# Patient Record
Sex: FEMALE | Race: WHITE | Employment: UNEMPLOYED | URBAN - METROPOLITAN AREA
[De-identification: names, ages, dates, MRNs, and addresses within clinical notes are randomized per-mention and may not be internally consistent; named-entity substitution may affect disease eponyms.]

---

## 2021-07-22 ENCOUNTER — HOSPITAL ENCOUNTER (EMERGENCY)
Age: 36
Discharge: HOME | End: 2021-07-22
Attending: EMERGENCY MEDICINE
Payer: COMMERCIAL

## 2021-07-22 VITALS
OXYGEN SATURATION: 97 % | TEMPERATURE: 97.7 F | DIASTOLIC BLOOD PRESSURE: 62 MMHG | HEART RATE: 91 BPM | WEIGHT: 240.5 LBS | HEIGHT: 66 IN | RESPIRATION RATE: 18 BRPM | SYSTOLIC BLOOD PRESSURE: 111 MMHG | BODY MASS INDEX: 38.65 KG/M2

## 2021-07-22 DIAGNOSIS — R60.0 EDEMA OF BOTH LOWER EXTREMITIES: Primary | ICD-10-CM

## 2021-07-22 DIAGNOSIS — Z72.0 TOBACCO ABUSE: ICD-10-CM

## 2021-07-22 DIAGNOSIS — R60.9 PERIPHERAL EDEMA: ICD-10-CM

## 2021-07-22 LAB
ANION GAP SERPL CALC-SCNC: 7 MMOL/L (ref 5–15)
BNP SERPL-MCNC: 108 PG/ML (ref 0–125)
BUN SERPL-MCNC: 9 MG/DL (ref 6–20)
BUN/CREAT SERPL: 9 (ref 12–20)
CALCIUM SERPL-MCNC: 8.6 MG/DL (ref 8.5–10.1)
CHLORIDE SERPL-SCNC: 107 MMOL/L (ref 97–108)
CO2 SERPL-SCNC: 29 MMOL/L (ref 21–32)
CREAT SERPL-MCNC: 0.95 MG/DL (ref 0.55–1.02)
ERYTHROCYTE [DISTWIDTH] IN BLOOD BY AUTOMATED COUNT: 14.7 % (ref 11.5–14.5)
GLUCOSE SERPL-MCNC: 84 MG/DL (ref 65–100)
HCT VFR BLD AUTO: 34.9 % (ref 35–47)
HGB BLD-MCNC: 11.2 G/DL (ref 11.5–16)
MCH RBC QN AUTO: 28.7 PG (ref 26–34)
MCHC RBC AUTO-ENTMCNC: 32.1 G/DL (ref 30–36.5)
MCV RBC AUTO: 89.5 FL (ref 80–99)
NRBC # BLD: 0 K/UL (ref 0–0.01)
NRBC BLD-RTO: 0 PER 100 WBC
PLATELET # BLD AUTO: 240 K/UL (ref 150–400)
PMV BLD AUTO: 10.4 FL (ref 8.9–12.9)
POTASSIUM SERPL-SCNC: 3.7 MMOL/L (ref 3.5–5.1)
RBC # BLD AUTO: 3.9 M/UL (ref 3.8–5.2)
SODIUM SERPL-SCNC: 143 MMOL/L (ref 136–145)
WBC # BLD AUTO: 6 K/UL (ref 3.6–11)

## 2021-07-22 PROCEDURE — 85027 COMPLETE CBC AUTOMATED: CPT

## 2021-07-22 PROCEDURE — 96375 TX/PRO/DX INJ NEW DRUG ADDON: CPT

## 2021-07-22 PROCEDURE — 83880 ASSAY OF NATRIURETIC PEPTIDE: CPT

## 2021-07-22 PROCEDURE — 96374 THER/PROPH/DIAG INJ IV PUSH: CPT

## 2021-07-22 PROCEDURE — 36415 COLL VENOUS BLD VENIPUNCTURE: CPT

## 2021-07-22 PROCEDURE — 80048 BASIC METABOLIC PNL TOTAL CA: CPT

## 2021-07-22 PROCEDURE — 74011250636 HC RX REV CODE- 250/636: Performed by: EMERGENCY MEDICINE

## 2021-07-22 PROCEDURE — 99283 EMERGENCY DEPT VISIT LOW MDM: CPT

## 2021-07-22 RX ORDER — FUROSEMIDE 10 MG/ML
40 INJECTION INTRAMUSCULAR; INTRAVENOUS
Status: COMPLETED | OUTPATIENT
Start: 2021-07-22 | End: 2021-07-22

## 2021-07-22 RX ORDER — BUPRENORPHINE HYDROCHLORIDE AND NALOXONE HYDROCHLORIDE DIHYDRATE 8; 2 MG/1; MG/1
3 TABLET SUBLINGUAL DAILY
COMMUNITY

## 2021-07-22 RX ORDER — FUROSEMIDE 20 MG/1
20 TABLET ORAL DAILY
Qty: 10 TABLET | Refills: 0 | Status: SHIPPED | OUTPATIENT
Start: 2021-07-22 | End: 2021-08-01

## 2021-07-22 RX ORDER — NAPROXEN 500 MG/1
500 TABLET ORAL 2 TIMES DAILY WITH MEALS
Qty: 20 TABLET | Refills: 0 | Status: ON HOLD | OUTPATIENT
Start: 2021-07-22 | End: 2022-05-10

## 2021-07-22 RX ORDER — KETOROLAC TROMETHAMINE 30 MG/ML
30 INJECTION, SOLUTION INTRAMUSCULAR; INTRAVENOUS
Status: COMPLETED | OUTPATIENT
Start: 2021-07-22 | End: 2021-07-22

## 2021-07-22 RX ORDER — CLONAZEPAM 1 MG/1
1 TABLET ORAL 2 TIMES DAILY
COMMUNITY

## 2021-07-22 RX ADMIN — FUROSEMIDE 40 MG: 10 INJECTION, SOLUTION INTRAVENOUS at 15:12

## 2021-07-22 RX ADMIN — KETOROLAC TROMETHAMINE 30 MG: 30 INJECTION, SOLUTION INTRAMUSCULAR; INTRAVENOUS at 15:52

## 2021-07-22 NOTE — DISCHARGE INSTRUCTIONS
Lizzie Ganser, it was a pleasure taking care of you at [facility] Emergency Department today. We know that when you come to [facility], you are entrusting us with your health, comfort, and safety. Our physicians and nurses honor that trust, and we truly appreciate the opportunity to care for you and your loved ones. We also VALUE your feedback. If you receive a survey about your Emergency Department experience today, please fill it out. We care about our patients' feedback, and we listen to what you have to say. Thank you!

## 2021-07-22 NOTE — ED NOTES
Emergency Department Nursing Plan of Care       The Nursing Plan of Care is developed from the Nursing assessment and Emergency Department Attending provider initial evaluation. The plan of care may be reviewed in the ED Provider note.     The Plan of Care was developed with the following considerations:   Patient / Family readiness to learn indicated by:verbalized understanding  Persons(s) to be included in education: patient  Barriers to Learning/Limitations:No    Signed     Alma Rosa Giraldo RN    7/22/2021   3:27 PM

## 2021-07-22 NOTE — LETTER
[facility] EMERGENCY DEPT  [address] [address] [phone]    Work/School Note    Date: 7/22/2021    To Whom It May concern:    Daryn Power was seen and treated today in the emergency room by the following provider(s):  Attending Provider: Alva Casarez MD.      Daryn Power may return to work on 7/24/21.     Sincerely,          Isacc Sinha MD

## 2021-07-23 NOTE — ED PROVIDER NOTES
EMERGENCY DEPARTMENT HISTORY AND PHYSICAL EXAM      Please note that this dictation was completed with the assistance of \"Dragon\", the computer voice recognition software. Quite often unanticipated grammatical, syntax, homophones, and other interpretive errors are inadvertently transcribed by the computer software. Please disregard these errors and any errors that have escaped final proofreading. Thank you. Patient Name: Marie Churchill  : 1985  MRN: [id number]  History of Presenting Illness     Chief Complaint   Patient presents with    Leg Swelling     bilaterally x3 days     History Provided By: Patient    HPI: Marie Churchill, 38 y.o. female with past medical history as documented below presents to the ED with c/o of 3 days of atraumatic bilateral lower extremity swelling. Pt states she noticed the swelling three days ago, worsening since. Denies falls or trauma. States edema is worse at end of day. She has tried to prop up her legs with some relief. She states she had similar swelling last year when she was \"admitted for infection and the antibiotics caused swelling. \" Denies any new medications. Denies hx of CHF, liver disease or kidney disease. Denies recent prolonged travel or immobilization, no numbness/weakness, no OCP use, no hx of DVT/PE. Pt denies any other exacerbating or ameliorating factors. Additionally, pt specifically denies any recent fever, chills, headache, nausea, vomiting, abdominal pain, CP, SOB, lightheadedness, dizziness, numbness, weakness, heart palpitations, urinary sxs, diarrhea, constipation, melena, hematochezia, cough, or congestion. There are no other complaints, changes or physical findings pertinent to the HPI at this time.     PCP: Cristy, MD Christopher    Past History   Past Medical History:  Past Medical History:   Diagnosis Date    Psychiatric disorder     depression    Psychiatric disorder     anxiety       Past Surgical History:  Past Surgical History:   Procedure Laterality Date    HX ORTHOPAEDIC  2012    left shoulder       Family History:  Denies    Social History:  Social History     Tobacco Use    Smoking status: Not on file   Substance Use Topics    Alcohol use: Not on file    Drug use: Not on file       Allergies:  No Known Allergies    Current Medications:  No current facility-administered medications on file prior to encounter. Current Outpatient Medications on File Prior to Encounter   Medication Sig Dispense Refill    clonazePAM (KlonoPIN) 1 mg tablet Take 1 mg by mouth two (2) times a day.  buprenorphine-naloxone 8-2 mg subl 2 Tablets by SubLINGual route daily. Review of Systems   A complete ROS was reviewed by me today and was negative, unless otherwise specified below:  Review of Systems   Constitutional: Negative. Negative for chills and fever. HENT: Negative. Negative for congestion and sore throat. Eyes: Negative. Respiratory: Negative. Negative for cough, chest tightness, shortness of breath and wheezing. Cardiovascular: Positive for leg swelling. Negative for chest pain and palpitations. Gastrointestinal: Negative. Negative for abdominal distention, abdominal pain, blood in stool, constipation, diarrhea, nausea and vomiting. Endocrine: Negative. Genitourinary: Negative. Negative for dysuria, flank pain, frequency, hematuria and urgency. Musculoskeletal: Negative. Negative for arthralgias, back pain and myalgias. Skin: Negative. Negative for color change and rash. Neurological: Negative. Negative for dizziness, syncope, speech difficulty, weakness, light-headedness, numbness and headaches. Hematological: Negative. Psychiatric/Behavioral: Negative. Negative for confusion and self-injury. The patient is not nervous/anxious. All other systems reviewed and are negative. Physical Exam   Physical Exam  Vitals and nursing note reviewed. Constitutional:       General: She is not in acute distress. Appearance: She is well-developed. She is not diaphoretic. HENT:      Head: Normocephalic and atraumatic. Mouth/Throat:      Pharynx: No oropharyngeal exudate. Eyes:      Conjunctiva/sclera: Conjunctivae normal.   Cardiovascular:      Rate and Rhythm: Normal rate and regular rhythm. Heart sounds: Normal heart sounds. Pulmonary:      Effort: Pulmonary effort is normal. No respiratory distress. Breath sounds: Normal breath sounds. No wheezing or rales. Chest:      Chest wall: No tenderness. Abdominal:      General: Bowel sounds are normal. There is no distension. Palpations: Abdomen is soft. There is no mass. Tenderness: There is no abdominal tenderness. There is no guarding or rebound. Musculoskeletal:         General: Normal range of motion. Cervical back: Normal range of motion. Right lower leg: Edema present. Left lower leg: Edema present. Comments: No palpable cords, comparments soft, no overlying erythema or warmth, NVI distally, +1 bilateral pedal edema   Skin:     General: Skin is warm. Neurological:      Mental Status: She is alert and oriented to person, place, and time. Cranial Nerves: No cranial nerve deficit. Motor: No abnormal muscle tone. Diagnostic Study Results     Labs -   I have personally reviewed and interpreted all available laboratory results.    Recent Results (from the past 24 hour(s))   CBC W/O DIFF    Collection Time: 07/22/21  2:57 PM   Result Value Ref Range    WBC 6.0 3.6 - 11.0 K/uL    RBC 3.90 3.80 - 5.20 M/uL    HGB 11.2 (L) 11.5 - 16.0 g/dL    HCT 34.9 (L) 35.0 - 47.0 %    MCV 89.5 80.0 - 99.0 FL    MCH 28.7 26.0 - 34.0 PG    MCHC 32.1 30.0 - 36.5 g/dL    RDW 14.7 (H) 11.5 - 14.5 %    PLATELET 558 309 - 708 K/uL    MPV 10.4 8.9 - 12.9 FL    NRBC 0.0 0  WBC    ABSOLUTE NRBC 0.00 0.00 - 0.94 K/uL   METABOLIC PANEL, BASIC    Collection Time: 07/22/21  2:57 PM   Result Value Ref Range    Sodium 143 136 - 145 mmol/L    Potassium 3.7 3.5 - 5.1 mmol/L    Chloride 107 97 - 108 mmol/L    CO2 29 21 - 32 mmol/L    Anion gap 7 5 - 15 mmol/L    Glucose 84 65 - 100 mg/dL    BUN 9 6 - 20 MG/DL    Creatinine 0.95 0.55 - 1.02 MG/DL    BUN/Creatinine ratio 9 (L) 12 - 20      GFR est AA >60 >60 ml/min/1.73m2    GFR est non-AA >60 >60 ml/min/1.73m2    Calcium 8.6 8.5 - 10.1 MG/DL   NT-PRO BNP    Collection Time: 07/22/21  2:57 PM   Result Value Ref Range    NT pro- 0 - 125 PG/ML       Radiologic Studies -   I have personally reviewed and interpreted all available imaging studies and agree with radiology interpretation and report. No orders to display     CT Results  (Last 48 hours)    None        CXR Results  (Last 48 hours)    None          Medical Decision Making   I reviewed the vital signs, available nursing notes, past medical history, past surgical history, family history and social history. Vital Signs-Reviewed the patient's vital signs. Patient Vitals for the past 24 hrs:   Temp Pulse Resp BP SpO2   07/22/21 1605  91 18 111/62 97 %   07/22/21 1435 97.7 °F (36.5 °C) 95 18 108/72 98 %       Pulse Oximetry Analysis - 98% on RA    Cardiac Monitor:   Rate: 95 bpm  The cardiac monitor revealed the following rhythm as interpreted by me: Normal Sinus Rhythm       Records Reviewed: Nursing Notes, Old Medical Records, Previous electrocardiograms, Previous Radiology Studies and Previous Laboratory Studies    Provider Notes (Medical Decision Making):   Pt presents with acute bilateral lower extremity swelling; stable vitals; PMS intact in affected limb. DDx: lymphedema, muscle strain vs sprain. The pt is unlikely to have DVT. There is no recent travel, h/o PE/DVT, neg gary, no hormone use, non-smoker. There is no trauma, deformity to warrant x-ray. The leg is not cold to touch, there is strong peripheral pulse, no paralysis or parasthesia, no pallor to suggest compartment syndrome.  There are no rashes, excessive warmth, fever, induration of skin to suggest cellulitis/osteo. The pt is motor and sensory intact. Will provide symptomatic treatment and reassess. Anticipate discharge home with close PCP follow-up. ED Course:   I am the first physician for this patient's ED visit today. Initial assessment performed. I discussed presenting problems, concerns and my formulated plan for today's visit with the patient and any available family members at bedside. I encouraged them to ask questions as they arise throughout the visit. I reviewed our electronic medical record system for any past medical records that were available that may contribute to the patient's current condition, the nursing notes and vital signs from today's visit. ED Orders Placed :  Orders Placed This Encounter    CBC W/O DIFF    METABOLIC PANEL, BASIC    NT-PRO BNP    furosemide (LASIX) injection 40 mg    clonazePAM (KlonoPIN) 1 mg tablet    buprenorphine-naloxone 8-2 mg subl    ketorolac (TORADOL) injection 30 mg    furosemide (Lasix) 20 mg tablet    naproxen (NAPROSYN) 500 mg tablet       ED Medications Administered:  Medications   furosemide (LASIX) injection 40 mg (40 mg IntraVENous Given 7/22/21 1512)   ketorolac (TORADOL) injection 30 mg (30 mg IntraVENous Given 7/22/21 1552)        Progress Note:  Patient has been reassessed and reports feeling better and symptoms have improved significantly after ED treatment. Ciara Bates's final labs and imaging have been reviewed with her and available family and/or caregiver. They have been counseled regarding her diagnosis. She verbally conveys understanding and agreement of the signs, symptoms, diagnosis, treatment and prognosis and additionally agrees to follow up as recommended with Dr. Mirtha Marin MD and/or specialist in 24 - 48 hours. She also agrees with the care-plan we created together and conveys that all of her questions have been answered.   I have also put together a packet of discharge instructions for her that include: 1) educational information regarding their diagnosis, 2) how to care for their diagnosis at home, as well a 3) list of reasons why they would want to return to the ED prior to their follow-up appointment should the patient's condition change or symptoms worsen. I have answered all questions to the patient's satisfaction. Strict return precautions given. She both understood and agreed with plan as discussed. Vital signs stable for discharge. Disposition:   DISCHARGE  The pt is ready for discharge. The pt's signs, symptoms, diagnosis, and discharge instructions have been discussed and pt has conveyed their understanding. The pt is to follow up as recommended or return to ER should their symptoms worsen. Plan has been discussed and pt is in agreement. Plan:  1. Return precautions as discussed with patient and available family and/or caregiver. 2.   Discharge Medication List as of 7/22/2021  3:51 PM      START taking these medications    Details   furosemide (Lasix) 20 mg tablet Take 1 Tablet by mouth daily for 10 days. , Normal, Disp-10 Tablet, R-0      naproxen (NAPROSYN) 500 mg tablet Take 1 Tablet by mouth two (2) times daily (with meals). , Normal, Disp-20 Tablet, R-0         CONTINUE these medications which have NOT CHANGED    Details   clonazePAM (KlonoPIN) 1 mg tablet Take 1 mg by mouth two (2) times a day., Historical Med      buprenorphine-naloxone 8-2 mg subl 2 Tablets by SubLINGual route daily. , Historical Med           3. Follow-up Information     Follow up With Specialties Details Why [address] EMERGENCY DEPT Emergency Medicine  As needed, If symptoms worsen [address]          Instructed to return to ED if worse  Diagnosis   Clinical Impression:  1. Edema of both lower extremities    2. Peripheral edema    3.  Tobacco abuse        Attestation:  Clarice Etienne MD, am the attending of record for this patient. I personally performed the services described in this documentation on this date, 7/22/2021 for patient, Hannah Westfall. I have reviewed the chart and verified that the record is accurate and complete.

## 2022-04-12 ENCOUNTER — HOSPITAL ENCOUNTER (EMERGENCY)
Age: 37
Discharge: LEFT BEFORE DISCHARGE | End: 2022-04-12
Attending: EMERGENCY MEDICINE
Payer: COMMERCIAL

## 2022-04-12 ENCOUNTER — APPOINTMENT (OUTPATIENT)
Dept: VASCULAR SURGERY | Age: 37
End: 2022-04-12
Attending: NURSE PRACTITIONER
Payer: COMMERCIAL

## 2022-04-12 ENCOUNTER — APPOINTMENT (OUTPATIENT)
Dept: GENERAL RADIOLOGY | Age: 37
End: 2022-04-12
Attending: NURSE PRACTITIONER
Payer: COMMERCIAL

## 2022-04-12 VITALS
RESPIRATION RATE: 16 BRPM | WEIGHT: 279.76 LBS | HEART RATE: 83 BPM | BODY MASS INDEX: 44.96 KG/M2 | HEIGHT: 66 IN | OXYGEN SATURATION: 97 % | TEMPERATURE: 98 F | SYSTOLIC BLOOD PRESSURE: 161 MMHG | DIASTOLIC BLOOD PRESSURE: 62 MMHG

## 2022-04-12 DIAGNOSIS — I87.8 VENOUS STASIS: Primary | ICD-10-CM

## 2022-04-12 LAB
ANION GAP SERPL CALC-SCNC: 2 MMOL/L (ref 5–15)
ATRIAL RATE: 91 BPM
BASOPHILS # BLD: 0 K/UL (ref 0–0.1)
BASOPHILS NFR BLD: 1 % (ref 0–1)
BNP SERPL-MCNC: 23 PG/ML
BUN SERPL-MCNC: 9 MG/DL (ref 6–20)
BUN/CREAT SERPL: 10 (ref 12–20)
CALCIUM SERPL-MCNC: 8.9 MG/DL (ref 8.5–10.1)
CALCULATED P AXIS, ECG09: 28 DEGREES
CALCULATED R AXIS, ECG10: -11 DEGREES
CALCULATED T AXIS, ECG11: 3 DEGREES
CHLORIDE SERPL-SCNC: 107 MMOL/L (ref 97–108)
CO2 SERPL-SCNC: 28 MMOL/L (ref 21–32)
COMMENT, HOLDF: NORMAL
CREAT SERPL-MCNC: 0.89 MG/DL (ref 0.55–1.02)
DIAGNOSIS, 93000: NORMAL
DIFFERENTIAL METHOD BLD: ABNORMAL
EOSINOPHIL # BLD: 0.5 K/UL (ref 0–0.4)
EOSINOPHIL NFR BLD: 6 % (ref 0–7)
ERYTHROCYTE [DISTWIDTH] IN BLOOD BY AUTOMATED COUNT: 16.1 % (ref 11.5–14.5)
GLUCOSE SERPL-MCNC: 76 MG/DL (ref 65–100)
HCT VFR BLD AUTO: 33.1 % (ref 35–47)
HGB BLD-MCNC: 10.3 G/DL (ref 11.5–16)
IMM GRANULOCYTES # BLD AUTO: 0 K/UL (ref 0–0.04)
IMM GRANULOCYTES NFR BLD AUTO: 0 % (ref 0–0.5)
LYMPHOCYTES # BLD: 2.3 K/UL (ref 0.8–3.5)
LYMPHOCYTES NFR BLD: 28 % (ref 12–49)
MAGNESIUM SERPL-MCNC: 2.3 MG/DL (ref 1.6–2.4)
MCH RBC QN AUTO: 26.8 PG (ref 26–34)
MCHC RBC AUTO-ENTMCNC: 31.1 G/DL (ref 30–36.5)
MCV RBC AUTO: 86.2 FL (ref 80–99)
MONOCYTES # BLD: 0.6 K/UL (ref 0–1)
MONOCYTES NFR BLD: 7 % (ref 5–13)
NEUTS SEG # BLD: 4.7 K/UL (ref 1.8–8)
NEUTS SEG NFR BLD: 58 % (ref 32–75)
NRBC # BLD: 0 K/UL (ref 0–0.01)
NRBC BLD-RTO: 0 PER 100 WBC
P-R INTERVAL, ECG05: 174 MS
PLATELET # BLD AUTO: 317 K/UL (ref 150–400)
PMV BLD AUTO: 10.2 FL (ref 8.9–12.9)
POTASSIUM SERPL-SCNC: 4 MMOL/L (ref 3.5–5.1)
Q-T INTERVAL, ECG07: 398 MS
QRS DURATION, ECG06: 88 MS
QTC CALCULATION (BEZET), ECG08: 489 MS
RBC # BLD AUTO: 3.84 M/UL (ref 3.8–5.2)
SAMPLES BEING HELD,HOLD: NORMAL
SODIUM SERPL-SCNC: 137 MMOL/L (ref 136–145)
VENTRICULAR RATE, ECG03: 91 BPM
WBC # BLD AUTO: 8 K/UL (ref 3.6–11)

## 2022-04-12 PROCEDURE — 74011250636 HC RX REV CODE- 250/636: Performed by: NURSE PRACTITIONER

## 2022-04-12 PROCEDURE — 83880 ASSAY OF NATRIURETIC PEPTIDE: CPT

## 2022-04-12 PROCEDURE — 96374 THER/PROPH/DIAG INJ IV PUSH: CPT

## 2022-04-12 PROCEDURE — 83735 ASSAY OF MAGNESIUM: CPT

## 2022-04-12 PROCEDURE — 71046 X-RAY EXAM CHEST 2 VIEWS: CPT

## 2022-04-12 PROCEDURE — 80048 BASIC METABOLIC PNL TOTAL CA: CPT

## 2022-04-12 PROCEDURE — 74011250637 HC RX REV CODE- 250/637: Performed by: NURSE PRACTITIONER

## 2022-04-12 PROCEDURE — 93970 EXTREMITY STUDY: CPT

## 2022-04-12 PROCEDURE — 99285 EMERGENCY DEPT VISIT HI MDM: CPT

## 2022-04-12 PROCEDURE — 85025 COMPLETE CBC W/AUTO DIFF WBC: CPT

## 2022-04-12 PROCEDURE — 36415 COLL VENOUS BLD VENIPUNCTURE: CPT

## 2022-04-12 PROCEDURE — 93005 ELECTROCARDIOGRAM TRACING: CPT

## 2022-04-12 RX ORDER — METHOCARBAMOL 750 MG/1
750 TABLET, FILM COATED ORAL ONCE
Status: COMPLETED | OUTPATIENT
Start: 2022-04-12 | End: 2022-04-12

## 2022-04-12 RX ORDER — KETOROLAC TROMETHAMINE 30 MG/ML
15 INJECTION, SOLUTION INTRAMUSCULAR; INTRAVENOUS
Status: COMPLETED | OUTPATIENT
Start: 2022-04-12 | End: 2022-04-12

## 2022-04-12 RX ORDER — FUROSEMIDE 20 MG/1
20 TABLET ORAL DAILY
Qty: 10 TABLET | Refills: 0 | Status: SHIPPED | OUTPATIENT
Start: 2022-04-12 | End: 2022-04-22

## 2022-04-12 RX ADMIN — METHOCARBAMOL 750 MG: 750 TABLET ORAL at 13:09

## 2022-04-12 RX ADMIN — KETOROLAC TROMETHAMINE 15 MG: 30 INJECTION, SOLUTION INTRAMUSCULAR; INTRAVENOUS at 13:09

## 2022-04-12 NOTE — ED TRIAGE NOTES
Patient arrives for bilateral leg swelling for over a week. Patient continues to state that she fell out of the bed about a week ago and has had RIGHT sided lower back pain since.  Denies taking anything prior to arrival.

## 2022-04-12 NOTE — ED PROVIDER NOTES
41-year-old female with a past medical history of anxiety and depression presents to the ER today for evaluation of acute left-sided back pain, bilateral lower extremity pain and swelling. Patient states that she started to experience bilateral lower extremity swelling while ago which has continued to progress to the point where her legs now feel very tight, heavy, and are starting to ache. She also noticed that yesterday after walking up a few flights of stairs she felt short of breath. She also reports acute left-sided low back/buttock pain after blunt injury that occurred about 1 week ago. She states that first she had to walk in a bent over posture to prevent her back from hurting, but it has started to improve a little bit over the last several days. Of note, it seems the patient was seen in the facility in 2021 for her bilateral lower extremity swelling. At that time she had a negative work-up, and was placed on Lasix for short period of time, which she states helped to improve her symptoms. She has an appointment to establish care with a PCP on May 11th of this year. Past Medical History:   Diagnosis Date    Psychiatric disorder     depression    Psychiatric disorder     anxiety       Past Surgical History:   Procedure Laterality Date    HX ORTHOPAEDIC  2012    left shoulder         History reviewed. No pertinent family history.     Social History     Socioeconomic History    Marital status:      Spouse name: Not on file    Number of children: Not on file    Years of education: Not on file    Highest education level: Not on file   Occupational History    Not on file   Tobacco Use    Smoking status: Never Smoker    Smokeless tobacco: Never Used   Substance and Sexual Activity    Alcohol use: Not Currently    Drug use: Not on file    Sexual activity: Not on file   Other Topics Concern    Not on file   Social History Narrative    Not on file     Social Determinants of Health     Financial Resource Strain:     Difficulty of Paying Living Expenses: Not on file   Food Insecurity:     Worried About Running Out of Food in the Last Year: Not on file    Kamala of Food in the Last Year: Not on file   Transportation Needs:     Lack of Transportation (Medical): Not on file    Lack of Transportation (Non-Medical): Not on file   Physical Activity:     Days of Exercise per Week: Not on file    Minutes of Exercise per Session: Not on file   Stress:     Feeling of Stress : Not on file   Social Connections:     Frequency of Communication with Friends and Family: Not on file    Frequency of Social Gatherings with Friends and Family: Not on file    Attends Sabianism Services: Not on file    Active Member of [address] [place] [address] [employer] or Organizations: Not on file    Attends Club or Organization Meetings: Not on file    Marital Status: Not on file   Intimate Partner Violence:     Fear of Current or Ex-Partner: Not on file    Emotionally Abused: Not on file    Physically Abused: Not on file    Sexually Abused: Not on file   Housing Stability:     Unable to Pay for Housing in the Last Year: Not on file    Number of [place] in the Last Year: Not on file    Unstable Housing in the Last Year: Not on file         ALLERGIES: Patient has no known allergies. Review of Systems   Constitutional: Negative for fever. HENT: Negative for sore throat. Eyes: Negative for visual disturbance. Respiratory: Positive for shortness of breath. No orthopnea   Cardiovascular: Positive for leg swelling. Negative for chest pain and palpitations. Gastrointestinal: Negative for vomiting. Genitourinary: Negative for dysuria. Musculoskeletal: Positive for back pain. Skin: Negative for rash. Neurological: Negative for dizziness.        Vitals:    04/12/22 0928   BP: (!) 161/62   Pulse: 83   Resp: 16   Temp: 98 °F (36.7 °C)   SpO2: 97%   Weight: 126.9 kg (279 lb 12.2 oz)   Height: 5' 6\" (1.676 m) Physical Exam  Vitals and nursing note reviewed. Constitutional:       General: She is not in acute distress. Appearance: Normal appearance. She is not ill-appearing. HENT:      Head: Normocephalic and atraumatic. Right Ear: External ear normal.      Left Ear: External ear normal.      Nose: Nose normal.      Mouth/Throat:      Mouth: Mucous membranes are moist.      Pharynx: Oropharynx is clear. Eyes:      General: No scleral icterus. Extraocular Movements: Extraocular movements intact. Conjunctiva/sclera: Conjunctivae normal.      Pupils: Pupils are equal, round, and reactive to light. Cardiovascular:      Rate and Rhythm: Normal rate and regular rhythm. Pulses: Normal pulses. Radial pulses are 2+ on the right side and 2+ on the left side. Heart sounds: Normal heart sounds. Comments: Moderate to severe bilateral lower extremity swelling without pitting  Pulmonary:      Effort: Pulmonary effort is normal.      Breath sounds: Normal breath sounds. Abdominal:      General: Abdomen is flat. Bowel sounds are normal.      Palpations: Abdomen is soft. Musculoskeletal:      Cervical back: Normal range of motion and neck supple. No rigidity. No muscular tenderness. Lumbar back: Tenderness present. Decreased range of motion. Comments: Pain to palpation along left paraspinal musculature and left upper buttock musculature   Skin:     General: Skin is warm and dry. Coloration: Skin is not pale. Neurological:      General: No focal deficit present. Mental Status: She is alert and oriented to person, place, and time. Psychiatric:         Mood and Affect: Mood normal.         Behavior: Behavior normal.         Thought Content: Thought content normal.         Judgment: Judgment normal.          MDM      VITAL SIGNS:  No data found.       LABS:  Recent Results (from the past 6 hour(s))   EKG, 12 LEAD, INITIAL    Collection Time: 04/12/22 12:50 PM Result Value Ref Range    Ventricular Rate 91 BPM    Atrial Rate 91 BPM    P-R Interval 174 ms    QRS Duration 88 ms    Q-T Interval 398 ms    QTC Calculation (Bezet) 489 ms    Calculated P Axis 28 degrees    Calculated R Axis -11 degrees    Calculated T Axis 3 degrees    Diagnosis       Normal sinus rhythm  Minimal voltage criteria for LVH, may be normal variant ( R in aVL )  Nonspecific T wave abnormality  Prolonged QT  Abnormal ECG  No previous ECGs available  Confirmed by Alejandra Le ([id number]) on 4/12/2022 1:13:09 PM     CBC WITH AUTOMATED DIFF    Collection Time: 04/12/22  1:01 PM   Result Value Ref Range    WBC 8.0 3.6 - 11.0 K/uL    RBC 3.84 3.80 - 5.20 M/uL    HGB 10.3 (L) 11.5 - 16.0 g/dL    HCT 33.1 (L) 35.0 - 47.0 %    MCV 86.2 80.0 - 99.0 FL    MCH 26.8 26.0 - 34.0 PG    MCHC 31.1 30.0 - 36.5 g/dL    RDW 16.1 (H) 11.5 - 14.5 %    PLATELET 964 322 - 982 K/uL    MPV 10.2 8.9 - 12.9 FL    NRBC 0.0 0  WBC    ABSOLUTE NRBC 0.00 0.00 - 0.01 K/uL    NEUTROPHILS 58 32 - 75 %    LYMPHOCYTES 28 12 - 49 %    MONOCYTES 7 5 - 13 %    EOSINOPHILS 6 0 - 7 %    BASOPHILS 1 0 - 1 %    IMMATURE GRANULOCYTES 0 0.0 - 0.5 %    ABS. NEUTROPHILS 4.7 1.8 - 8.0 K/UL    ABS. LYMPHOCYTES 2.3 0.8 - 3.5 K/UL    ABS. MONOCYTES 0.6 0.0 - 1.0 K/UL    ABS. EOSINOPHILS 0.5 (H) 0.0 - 0.4 K/UL    ABS. BASOPHILS 0.0 0.0 - 0.1 K/UL    ABS. IMM.  GRANS. 0.0 0.00 - 0.04 K/UL    DF AUTOMATED     METABOLIC PANEL, BASIC    Collection Time: 04/12/22  1:01 PM   Result Value Ref Range    Sodium 137 136 - 145 mmol/L    Potassium 4.0 3.5 - 5.1 mmol/L    Chloride 107 97 - 108 mmol/L    CO2 28 21 - 32 mmol/L    Anion gap 2 (L) 5 - 15 mmol/L    Glucose 76 65 - 100 mg/dL    BUN 9 6 - 20 MG/DL    Creatinine 0.89 0.55 - 1.02 MG/DL    BUN/Creatinine ratio 10 (L) 12 - 20      GFR est AA >60 >60 ml/min/1.73m2    GFR est non-AA >60 >60 ml/min/1.73m2    Calcium 8.9 8.5 - 10.1 MG/DL   NT-PRO BNP    Collection Time: 04/12/22  1:01 PM   Result Value Ref Range NT pro-BNP 23 <125 PG/ML   SAMPLES BEING HELD    Collection Time: 04/12/22  1:01 PM   Result Value Ref Range    SAMPLES BEING HELD 1RED 1BLU     COMMENT        Add-on orders for these samples will be processed based on acceptable specimen integrity and analyte stability, which may vary by analyte. MAGNESIUM    Collection Time: 04/12/22  1:01 PM   Result Value Ref Range    Magnesium 2.3 1.6 - 2.4 mg/dL        IMAGING:  DUPLEX LOWER EXT VENOUS BILAT   Final Result      XR CHEST PA LAT   Final Result   Normal chest.            Medications During Visit:  Medications   ketorolac (TORADOL) injection 15 mg (15 mg IntraVENous Given 4/12/22 1309)   methocarbamoL (ROBAXIN) tablet 750 mg (750 mg Oral Given 4/12/22 1309)         DECISION MAKING:  Leopold Larsen is a 43 y.o. female who comes in as above. Patient eloped prior to reevaluation. Work-up unremarkable for any evidence of marked hypoalbuminemia/liver disease, kidney disease, or congestive heart failure. No DVT. Symptoms most consistent with venous stasis. Recommended 10 days of Lasix along with compression stockings and leg elevation (instructions reviewed via telephone after patient had eloped). Recommended establishing care with a PCP in the area for further management. The clinical decision making for this encounter included ordering and interpreting the above diagnostic tests with comparison to prior studies that are within our EMR. Past medical and surgical histories were reviewed, as were records from recent outpatient and emergency department visits. The above results discussed and reviewed with the patient. Patient verbalized understanding of the care plan, including any changes to current outpatient medication regimen, discussed disease process, symptom control, and follow-up care. Return precautions reviewed. IMPRESSION:  1.  Venous stasis        DISPOSITION:  Eloped      Discharge Medication List as of 4/12/2022  2:22 PM CONTINUE these medications which have NOT CHANGED    Details   clonazePAM (KlonoPIN) 1 mg tablet Take 1 mg by mouth two (2) times a day., Historical Med      buprenorphine-naloxone 8-2 mg subl 2 Tablets by SubLINGual route daily. , Historical Med      naproxen (NAPROSYN) 500 mg tablet Take 1 Tablet by mouth two (2) times daily (with meals). , Normal, Disp-20 Tablet, R-0              Follow-up Information    None           The patient is asked to follow-up with their primary care provider in the next several days. They are to call tomorrow for an appointment. The patient is asked to return promptly for any increased concerns or worsening of symptoms. They can return to this emergency department or any other emergency department.       Procedures

## 2022-04-12 NOTE — ED NOTES
Patient did not want to wait on test results. States her  had already called a medicaid cab and she needed to leave. IV removed. Patient ambulatory with steady gait.

## 2022-05-09 ENCOUNTER — APPOINTMENT (OUTPATIENT)
Dept: CT IMAGING | Age: 37
End: 2022-05-09
Attending: EMERGENCY MEDICINE
Payer: COMMERCIAL

## 2022-05-09 ENCOUNTER — HOSPITAL ENCOUNTER (OUTPATIENT)
Age: 37
Setting detail: OBSERVATION
Discharge: HOME | End: 2022-05-11
Attending: EMERGENCY MEDICINE | Admitting: INTERNAL MEDICINE
Payer: COMMERCIAL

## 2022-05-09 DIAGNOSIS — I26.99 BILATERAL PULMONARY EMBOLISM (HCC): Primary | ICD-10-CM

## 2022-05-09 LAB
ALBUMIN SERPL-MCNC: 3.5 G/DL (ref 3.5–5)
ALBUMIN/GLOB SERPL: 0.8 {RATIO} (ref 1.1–2.2)
ALP SERPL-CCNC: 73 U/L (ref 45–117)
ALT SERPL-CCNC: 15 U/L (ref 12–78)
ANION GAP SERPL CALC-SCNC: 7 MMOL/L (ref 5–15)
APTT PPP: 21 SEC (ref 22.1–31)
AST SERPL-CCNC: 23 U/L (ref 15–37)
BASOPHILS # BLD: 0 K/UL (ref 0–0.1)
BASOPHILS NFR BLD: 0 % (ref 0–1)
BILIRUB SERPL-MCNC: 0.3 MG/DL (ref 0.2–1)
BUN SERPL-MCNC: 9 MG/DL (ref 6–20)
BUN/CREAT SERPL: 10 (ref 12–20)
CALCIUM SERPL-MCNC: 8.9 MG/DL (ref 8.5–10.1)
CHLORIDE SERPL-SCNC: 105 MMOL/L (ref 97–108)
CO2 SERPL-SCNC: 28 MMOL/L (ref 21–32)
CREAT SERPL-MCNC: 0.89 MG/DL (ref 0.55–1.02)
D DIMER PPP FEU-MCNC: 5.99 MG/L FEU (ref 0–0.65)
DIFFERENTIAL METHOD BLD: ABNORMAL
EOSINOPHIL # BLD: 0.2 K/UL (ref 0–0.4)
EOSINOPHIL NFR BLD: 3 % (ref 0–7)
ERYTHROCYTE [DISTWIDTH] IN BLOOD BY AUTOMATED COUNT: 16.3 % (ref 11.5–14.5)
GLOBULIN SER CALC-MCNC: 4.4 G/DL (ref 2–4)
GLUCOSE SERPL-MCNC: 97 MG/DL (ref 65–100)
HCG SERPL QL: NEGATIVE
HCT VFR BLD AUTO: 37.8 % (ref 35–47)
HGB BLD-MCNC: 11.7 G/DL (ref 11.5–16)
IMM GRANULOCYTES # BLD AUTO: 0 K/UL (ref 0–0.04)
IMM GRANULOCYTES NFR BLD AUTO: 0 % (ref 0–0.5)
LYMPHOCYTES # BLD: 2.1 K/UL (ref 0.8–3.5)
LYMPHOCYTES NFR BLD: 25 % (ref 12–49)
MCH RBC QN AUTO: 26.7 PG (ref 26–34)
MCHC RBC AUTO-ENTMCNC: 31 G/DL (ref 30–36.5)
MCV RBC AUTO: 86.3 FL (ref 80–99)
MONOCYTES # BLD: 0.4 K/UL (ref 0–1)
MONOCYTES NFR BLD: 5 % (ref 5–13)
NEUTS SEG # BLD: 5.4 K/UL (ref 1.8–8)
NEUTS SEG NFR BLD: 67 % (ref 32–75)
NRBC # BLD: 0 K/UL (ref 0–0.01)
NRBC BLD-RTO: 0 PER 100 WBC
PLATELET # BLD AUTO: 336 K/UL (ref 150–400)
PMV BLD AUTO: 10.5 FL (ref 8.9–12.9)
POTASSIUM SERPL-SCNC: 4.5 MMOL/L (ref 3.5–5.1)
PROT SERPL-MCNC: 7.9 G/DL (ref 6.4–8.2)
RBC # BLD AUTO: 4.38 M/UL (ref 3.8–5.2)
SODIUM SERPL-SCNC: 140 MMOL/L (ref 136–145)
THERAPEUTIC RANGE,PTTT: ABNORMAL SECS (ref 58–77)
TROPONIN-HIGH SENSITIVITY: 131 NG/L (ref 0–51)
WBC # BLD AUTO: 8.2 K/UL (ref 3.6–11)

## 2022-05-09 PROCEDURE — 84484 ASSAY OF TROPONIN QUANT: CPT

## 2022-05-09 PROCEDURE — U0005 INFEC AGEN DETEC AMPLI PROBE: HCPCS

## 2022-05-09 PROCEDURE — 93005 ELECTROCARDIOGRAM TRACING: CPT

## 2022-05-09 PROCEDURE — 74011250636 HC RX REV CODE- 250/636: Performed by: EMERGENCY MEDICINE

## 2022-05-09 PROCEDURE — 85730 THROMBOPLASTIN TIME PARTIAL: CPT

## 2022-05-09 PROCEDURE — 74011000636 HC RX REV CODE- 636: Performed by: EMERGENCY MEDICINE

## 2022-05-09 PROCEDURE — 65270000029 HC RM PRIVATE

## 2022-05-09 PROCEDURE — 84703 CHORIONIC GONADOTROPIN ASSAY: CPT

## 2022-05-09 PROCEDURE — 96374 THER/PROPH/DIAG INJ IV PUSH: CPT

## 2022-05-09 PROCEDURE — 85379 FIBRIN DEGRADATION QUANT: CPT

## 2022-05-09 PROCEDURE — 94762 N-INVAS EAR/PLS OXIMTRY CONT: CPT

## 2022-05-09 PROCEDURE — 71275 CT ANGIOGRAPHY CHEST: CPT

## 2022-05-09 PROCEDURE — 80053 COMPREHEN METABOLIC PANEL: CPT

## 2022-05-09 PROCEDURE — 99285 EMERGENCY DEPT VISIT HI MDM: CPT

## 2022-05-09 PROCEDURE — 36415 COLL VENOUS BLD VENIPUNCTURE: CPT

## 2022-05-09 PROCEDURE — 85025 COMPLETE CBC W/AUTO DIFF WBC: CPT

## 2022-05-09 RX ORDER — ACETAMINOPHEN 650 MG/1
650 SUPPOSITORY RECTAL
Status: DISCONTINUED | OUTPATIENT
Start: 2022-05-09 | End: 2022-05-11 | Stop reason: HOSPADM

## 2022-05-09 RX ORDER — HEPARIN SODIUM 1000 [USP'U]/ML
40 INJECTION, SOLUTION INTRAVENOUS; SUBCUTANEOUS AS NEEDED
Status: DISCONTINUED | OUTPATIENT
Start: 2022-05-10 | End: 2022-05-10

## 2022-05-09 RX ORDER — SERTRALINE HYDROCHLORIDE 100 MG/1
200 TABLET, FILM COATED ORAL DAILY
COMMUNITY

## 2022-05-09 RX ORDER — HEPARIN SODIUM 1000 [USP'U]/ML
80 INJECTION, SOLUTION INTRAVENOUS; SUBCUTANEOUS ONCE
Status: COMPLETED | OUTPATIENT
Start: 2022-05-09 | End: 2022-05-09

## 2022-05-09 RX ORDER — HEPARIN SODIUM 10000 [USP'U]/100ML
17-36 INJECTION, SOLUTION INTRAVENOUS
Status: DISCONTINUED | OUTPATIENT
Start: 2022-05-09 | End: 2022-05-10

## 2022-05-09 RX ORDER — ONDANSETRON 4 MG/1
4 TABLET, ORALLY DISINTEGRATING ORAL
Status: DISCONTINUED | OUTPATIENT
Start: 2022-05-09 | End: 2022-05-11 | Stop reason: HOSPADM

## 2022-05-09 RX ORDER — POLYETHYLENE GLYCOL 3350 17 G/17G
17 POWDER, FOR SOLUTION ORAL DAILY PRN
Status: DISCONTINUED | OUTPATIENT
Start: 2022-05-09 | End: 2022-05-11 | Stop reason: HOSPADM

## 2022-05-09 RX ORDER — SODIUM CHLORIDE 0.9 % (FLUSH) 0.9 %
5-40 SYRINGE (ML) INJECTION EVERY 8 HOURS
Status: DISCONTINUED | OUTPATIENT
Start: 2022-05-09 | End: 2022-05-11 | Stop reason: HOSPADM

## 2022-05-09 RX ORDER — HEPARIN SODIUM 1000 [USP'U]/ML
80 INJECTION, SOLUTION INTRAVENOUS; SUBCUTANEOUS AS NEEDED
Status: DISCONTINUED | OUTPATIENT
Start: 2022-05-10 | End: 2022-05-10

## 2022-05-09 RX ORDER — ACETAMINOPHEN 325 MG/1
650 TABLET ORAL
Status: DISCONTINUED | OUTPATIENT
Start: 2022-05-09 | End: 2022-05-11 | Stop reason: HOSPADM

## 2022-05-09 RX ORDER — ONDANSETRON 2 MG/ML
4 INJECTION INTRAMUSCULAR; INTRAVENOUS
Status: DISCONTINUED | OUTPATIENT
Start: 2022-05-09 | End: 2022-05-11 | Stop reason: HOSPADM

## 2022-05-09 RX ORDER — SODIUM CHLORIDE 0.9 % (FLUSH) 0.9 %
5-40 SYRINGE (ML) INJECTION AS NEEDED
Status: DISCONTINUED | OUTPATIENT
Start: 2022-05-09 | End: 2022-05-11 | Stop reason: HOSPADM

## 2022-05-09 RX ADMIN — IOPAMIDOL 80 ML: 755 INJECTION, SOLUTION INTRAVENOUS at 21:33

## 2022-05-09 RX ADMIN — HEPARIN SODIUM 9070 UNITS: 1000 INJECTION INTRAVENOUS; SUBCUTANEOUS at 21:58

## 2022-05-09 RX ADMIN — HEPARIN SODIUM 17 UNITS/KG/HR: 10000 INJECTION, SOLUTION INTRAVENOUS at 22:18

## 2022-05-09 NOTE — ED NOTES
Pt presents to ED ambulatory complaining of shortness of breath since Friday. Pt reports having Covid in December and states it feels similar. Patient has not been vaccinated. Reports no known Covid exposure. Pts O2 sat 96% on RA at this time. Pt is alert and oriented x 4,  skin is warm and dry. Assessment completed and pt updated on plan of care. Call bell in reach. Emergency Department Nursing Plan of Care       The Nursing Plan of Care is developed from the Nursing assessment and Emergency Department Attending provider initial evaluation. The plan of care may be reviewed in the ED Provider note.     The Plan of Care was developed with the following considerations:   Patient / Family readiness to learn indicated by:verbalized understanding  Persons(s) to be included in education: patient  Barriers to Learning/Limitations:No    Signed

## 2022-05-09 NOTE — ED TRIAGE NOTES
Pt arrives with c/o SOB with exertion. The only other complaint the pt has at this time is runny nose.

## 2022-05-10 ENCOUNTER — APPOINTMENT (OUTPATIENT)
Dept: VASCULAR SURGERY | Age: 37
End: 2022-05-10
Attending: INTERNAL MEDICINE
Payer: COMMERCIAL

## 2022-05-10 ENCOUNTER — APPOINTMENT (OUTPATIENT)
Dept: NON INVASIVE DIAGNOSTICS | Age: 37
End: 2022-05-10
Attending: INTERNAL MEDICINE
Payer: COMMERCIAL

## 2022-05-10 LAB
ALBUMIN SERPL-MCNC: 3 G/DL (ref 3.5–5)
ALBUMIN/GLOB SERPL: 0.8 {RATIO} (ref 1.1–2.2)
ALP SERPL-CCNC: 61 U/L (ref 45–117)
ALT SERPL-CCNC: 16 U/L (ref 12–78)
AMPHET UR QL SCN: NEGATIVE
ANION GAP SERPL CALC-SCNC: 9 MMOL/L (ref 5–15)
APTT PPP: 106 SEC (ref 22.1–31)
APTT PPP: 46.4 SEC (ref 22.1–31)
AST SERPL-CCNC: 11 U/L (ref 15–37)
ATRIAL RATE: 86 BPM
BARBITURATES UR QL SCN: NEGATIVE
BASOPHILS # BLD: 0 K/UL (ref 0–0.1)
BASOPHILS NFR BLD: 1 % (ref 0–1)
BENZODIAZ UR QL: POSITIVE
BILIRUB SERPL-MCNC: 0.3 MG/DL (ref 0.2–1)
BUN SERPL-MCNC: 11 MG/DL (ref 6–20)
BUN/CREAT SERPL: 12 (ref 12–20)
CALCIUM SERPL-MCNC: 8.1 MG/DL (ref 8.5–10.1)
CALCULATED P AXIS, ECG09: 47 DEGREES
CALCULATED R AXIS, ECG10: 50 DEGREES
CALCULATED T AXIS, ECG11: 16 DEGREES
CANNABINOIDS UR QL SCN: NEGATIVE
CHLORIDE SERPL-SCNC: 106 MMOL/L (ref 97–108)
CO2 SERPL-SCNC: 27 MMOL/L (ref 21–32)
COCAINE UR QL SCN: NEGATIVE
CREAT SERPL-MCNC: 0.94 MG/DL (ref 0.55–1.02)
DIAGNOSIS, 93000: NORMAL
DIFFERENTIAL METHOD BLD: ABNORMAL
DRUG SCRN COMMENT,DRGCM: ABNORMAL
EOSINOPHIL # BLD: 0.3 K/UL (ref 0–0.4)
EOSINOPHIL NFR BLD: 4 % (ref 0–7)
ERYTHROCYTE [DISTWIDTH] IN BLOOD BY AUTOMATED COUNT: 16.3 % (ref 11.5–14.5)
GLOBULIN SER CALC-MCNC: 3.8 G/DL (ref 2–4)
GLUCOSE SERPL-MCNC: 98 MG/DL (ref 65–100)
HCT VFR BLD AUTO: 34.9 % (ref 35–47)
HGB BLD-MCNC: 10.6 G/DL (ref 11.5–16)
IMM GRANULOCYTES # BLD AUTO: 0 K/UL (ref 0–0.04)
IMM GRANULOCYTES NFR BLD AUTO: 0 % (ref 0–0.5)
LYMPHOCYTES # BLD: 3 K/UL (ref 0.8–3.5)
LYMPHOCYTES NFR BLD: 34 % (ref 12–49)
MAGNESIUM SERPL-MCNC: 2.1 MG/DL (ref 1.6–2.4)
MCH RBC QN AUTO: 26 PG (ref 26–34)
MCHC RBC AUTO-ENTMCNC: 30.4 G/DL (ref 30–36.5)
MCV RBC AUTO: 85.5 FL (ref 80–99)
METHADONE UR QL: NEGATIVE
MONOCYTES # BLD: 0.5 K/UL (ref 0–1)
MONOCYTES NFR BLD: 6 % (ref 5–13)
NEUTS SEG # BLD: 4.9 K/UL (ref 1.8–8)
NEUTS SEG NFR BLD: 55 % (ref 32–75)
NRBC # BLD: 0 K/UL (ref 0–0.01)
NRBC BLD-RTO: 0 PER 100 WBC
OPIATES UR QL: NEGATIVE
P-R INTERVAL, ECG05: 168 MS
PCP UR QL: NEGATIVE
PLATELET # BLD AUTO: 287 K/UL (ref 150–400)
PMV BLD AUTO: 10.5 FL (ref 8.9–12.9)
POTASSIUM SERPL-SCNC: 3.7 MMOL/L (ref 3.5–5.1)
PROT SERPL-MCNC: 6.8 G/DL (ref 6.4–8.2)
Q-T INTERVAL, ECG07: 402 MS
QRS DURATION, ECG06: 98 MS
QTC CALCULATION (BEZET), ECG08: 481 MS
RBC # BLD AUTO: 4.08 M/UL (ref 3.8–5.2)
SARS-COV-2, XPLCVT: NOT DETECTED
SODIUM SERPL-SCNC: 142 MMOL/L (ref 136–145)
SOURCE, COVRS: NORMAL
THERAPEUTIC RANGE,PTTT: ABNORMAL SECS (ref 58–77)
THERAPEUTIC RANGE,PTTT: ABNORMAL SECS (ref 58–77)
TROPONIN-HIGH SENSITIVITY: 98 NG/L (ref 0–51)
VENTRICULAR RATE, ECG03: 86 BPM
WBC # BLD AUTO: 8.8 K/UL (ref 3.6–11)

## 2022-05-10 PROCEDURE — 84484 ASSAY OF TROPONIN QUANT: CPT

## 2022-05-10 PROCEDURE — G0378 HOSPITAL OBSERVATION PER HR: HCPCS

## 2022-05-10 PROCEDURE — 74011250636 HC RX REV CODE- 250/636: Performed by: STUDENT IN AN ORGANIZED HEALTH CARE EDUCATION/TRAINING PROGRAM

## 2022-05-10 PROCEDURE — 83735 ASSAY OF MAGNESIUM: CPT

## 2022-05-10 PROCEDURE — 85730 THROMBOPLASTIN TIME PARTIAL: CPT

## 2022-05-10 PROCEDURE — 74011000250 HC RX REV CODE- 250: Performed by: INTERNAL MEDICINE

## 2022-05-10 PROCEDURE — 93306 TTE W/DOPPLER COMPLETE: CPT

## 2022-05-10 PROCEDURE — 85025 COMPLETE CBC W/AUTO DIFF WBC: CPT

## 2022-05-10 PROCEDURE — 80307 DRUG TEST PRSMV CHEM ANLYZR: CPT

## 2022-05-10 PROCEDURE — 65270000032 HC RM SEMIPRIVATE

## 2022-05-10 PROCEDURE — 80053 COMPREHEN METABOLIC PANEL: CPT

## 2022-05-10 PROCEDURE — 93970 EXTREMITY STUDY: CPT

## 2022-05-10 PROCEDURE — 74011250637 HC RX REV CODE- 250/637: Performed by: STUDENT IN AN ORGANIZED HEALTH CARE EDUCATION/TRAINING PROGRAM

## 2022-05-10 PROCEDURE — 36415 COLL VENOUS BLD VENIPUNCTURE: CPT

## 2022-05-10 PROCEDURE — 93970 EXTREMITY STUDY: CPT | Performed by: INTERNAL MEDICINE

## 2022-05-10 RX ORDER — CLONAZEPAM 0.5 MG/1
1 TABLET ORAL 2 TIMES DAILY
Status: DISCONTINUED | OUTPATIENT
Start: 2022-05-10 | End: 2022-05-11 | Stop reason: HOSPADM

## 2022-05-10 RX ORDER — BUPRENORPHINE HYDROCHLORIDE AND NALOXONE HYDROCHLORIDE DIHYDRATE 8; 2 MG/1; MG/1
3 TABLET SUBLINGUAL DAILY
Status: DISCONTINUED | OUTPATIENT
Start: 2022-05-10 | End: 2022-05-11 | Stop reason: HOSPADM

## 2022-05-10 RX ORDER — SERTRALINE HYDROCHLORIDE 50 MG/1
200 TABLET, FILM COATED ORAL DAILY
Status: DISCONTINUED | OUTPATIENT
Start: 2022-05-10 | End: 2022-05-11 | Stop reason: HOSPADM

## 2022-05-10 RX ADMIN — BUPRENORPHINE HYDROCHLORIDE AND NALOXONE HYDROCHLORIDE DIHYDRATE 3 TABLET: 8; 2 TABLET SUBLINGUAL at 09:59

## 2022-05-10 RX ADMIN — RIVAROXABAN 15 MG: 15 TABLET, FILM COATED ORAL at 08:29

## 2022-05-10 RX ADMIN — CLONAZEPAM 1 MG: 0.5 TABLET ORAL at 17:11

## 2022-05-10 RX ADMIN — SERTRALINE 200 MG: 50 TABLET, FILM COATED ORAL at 09:59

## 2022-05-10 RX ADMIN — RIVAROXABAN 15 MG: 15 TABLET, FILM COATED ORAL at 17:11

## 2022-05-10 RX ADMIN — SODIUM CHLORIDE, PRESERVATIVE FREE 10 ML: 5 INJECTION INTRAVENOUS at 22:06

## 2022-05-10 RX ADMIN — CLONAZEPAM 1 MG: 0.5 TABLET ORAL at 09:59

## 2022-05-10 NOTE — ED NOTES
Raul Briggs in the lab called and said that the most recent blue top (Ptt) clotted. The heparin drip has been started. She says she will try and run the ptt off the first tube and will call back if she is unable to.

## 2022-05-10 NOTE — PROGRESS NOTES
[address] with     Reason for Admission:  SOB                     RUR Score:   7%                  Plan for utilizing home health:     Not needed     PCP: First and Last name:  Other, MD Christopher     Name of Practice:    Are you a current patient: Yes/No:    Approximate date of last visit:    Can you participate in a virtual visit with your PCP:                     Current Advanced Directive/Advance Care Plan: Full Code      Healthcare Decision Maker:   Click here to complete [address] including selection of the Healthcare Decision Maker Relationship (ie \"Primary\")                             Transition of Care Plan:      CM spoke with pt to introduce her to the role of CM and transition of care. CM verified pt's demographics and insurance. CM will change the face sheet to reflect pt's new address. This pt lives at home with her . She is independent with her ADL's and IADL's. She stated that she has an appointment with a [facility] physician tomorrow. This was verified in [facility]. Pt was discussed in rounds today. She will be on Xarelto at d/c. Attending asked this CM to see if this medication is covered by pt's insurance. CM did call [facility] and was told that it is covered w/ no co-pay, but they do not have it in stock. They will have it by tomorrow. The pharmacy staff reached out to 2 other pharmacies to see if they have it in stock and they do not. Per pharmacy staff, they have Xarelto in loose tabs. CM sent Dr. Macario Montesinos a perfectserve message informing her of this. CM will follow. [address] [facility] [address] Management Interventions  PCP Verified by CM:  Yes  Palliative Care Criteria Met (RRAT>21 & CHF Dx)?: No  Transition of Care Consult (CM Consult): Discharge Planning  Physical Therapy Consult: No  Occupational Therapy Consult: No  Speech Therapy Consult: No  Support Systems: Spouse/Significant Other  Confirm Follow Up Transport: Family  The Plan for Transition of Care is Related to the Following Treatment Goals : safe d/c  The Patient and/or Patient Representative was Provided with a Choice of Provider and Agrees with the Discharge Plan?: Yes  Freedom of Choice List was Provided with Basic Dialogue that Supports the Patient's Individualized Plan of Care/Goals, Treatment Preferences and Shares the Quality Data Associated with the Providers?: Yes   Resource Information Provided?: No  Discharge Location  Patient Expects to be Discharged to[de-identified] Home with two level

## 2022-05-10 NOTE — ED NOTES
TRANSFER - OUT REPORT:    Verbal report given to [facility] RN by Nicole Gimenez RN(name) on Deborah Andersen  being transferred to [facility] PCU (unit) for routine progression of care       Report consisted of patients Situation, Background, Assessment and   Recommendations(SBAR). Information from the following report(s) SBAR, Kardex, ED Summary, [facility] and Recent Results was reviewed with the receiving nurse. Lines:   Peripheral IV 05/09/22 Right Antecubital (Active)        Opportunity for questions and clarification was provided.       Patient transported with:   Monitor  Registered Nurse   Personal belongings  Heparin gtt

## 2022-05-10 NOTE — PROGRESS NOTES
0524 , heparin placed on hold, notified hospitalist on call, will recheck in 2 hours. [id number] Dr Kaycee Muller returned the page, no new orders obtained.

## 2022-05-10 NOTE — PROGRESS NOTES
Spiritual Care Partner Volunteer visited patient at [facility] in [address] [place] [address] on 5/10/2022   Documented by:     ANNAMARIA Herrera, [facility], Staff [address]    [address] Paging Service  [phone]

## 2022-05-10 NOTE — PROGRESS NOTES
Telemed: aPTT 106, per order I place heparin on hold, will recheck in 2 hours. Plan: Chart reviewed and discussed with nurse. Continue heparin drip per protocol.

## 2022-05-10 NOTE — PROGRESS NOTES
0700  Shift change report received from [facility] (Nurse). Report included review of SBAR, accordion report results review, orders, meds, ROS and POC. (things to be done) All questions answered. Transfer of care completed. 0900 Pt resting comfortably     1000 Md informed of critical troponin    1100 Pt request drink. Pt brought sprite. 1200 Pt requesting information concerning discharge. 1400 Pt resting comfortably    1600 Pt again asked about discharge. I explained we are still waiting on Echo results. 18 MD spoke to Patient told her she would be discharged in morning.

## 2022-05-10 NOTE — PROGRESS NOTES
[facility]: MED RECONCILIATION    Comments/Recommendations:   Confirmed Suboxone with Claude Watson, DAVID at [facility] ([address] Last dose was yesterday morning  Counseled patient on new medication - rivaroxaban    Medications removed:  naproxen    Medications adjusted:  Sertraline to 200 mg    Information obtained from: Patient, RxQuery, and [facility]    Allergies: Patient has no known allergies. Prior to Admission Medications:   Prior to Admission Medications   Prescriptions Last Dose Informant Patient Reported? Taking? buprenorphine-naloxone 8-2 mg subl 5/9/2022 at AM Other Yes Yes   Sig: 3 Tablets by SubLINGual route daily. clonazePAM (KlonoPIN) 1 mg tablet 5/9/2022 at Unknown time Self Yes Yes   Sig: Take 1 mg by mouth two (2) times a day. sertraline (Zoloft) 100 mg tablet 5/9/2022 at AM Self Yes Yes   Sig: Take 200 mg by mouth daily.         Thank you,  Maribel Mario, PharmD, BCPS  [phone]

## 2022-05-10 NOTE — PROGRESS NOTES
Problem: Pulmonary Embolism Care Plan (Adult)  Goal: *Improvement of existing pulmonary embolism  Outcome: Progressing Towards Goal  Goal: *Absence of bleeding  Outcome: Progressing Towards Goal  Goal: *Labs within defined limits  Outcome: Progressing Towards Goal     Problem: Patient Education: Go to Patient Education Activity  Goal: Patient/Family Education  Outcome: Progressing Towards Goal

## 2022-05-10 NOTE — PROGRESS NOTES
Problem: Pulmonary Embolism Care Plan (Adult)  Goal: *Improvement of existing pulmonary embolism  5/10/2022 0116 by Savanna Montanez RN  Outcome: Progressing Towards Goal  5/10/2022 0114 by Savanna Montanez RN  Outcome: Progressing Towards Goal  Goal: *Absence of bleeding  5/10/2022 0116 by Savanna Montanez RN  Outcome: Progressing Towards Goal  5/10/2022 0114 by Savanna Montanez RN  Outcome: Progressing Towards Goal  Goal: *Labs within defined limits  5/10/2022 0116 by Savanna Montanez RN  Outcome: Progressing Towards Goal  5/10/2022 0114 by Savanna Montanez RN  Outcome: Progressing Towards Goal     Problem: Patient Education: Go to Patient Education Activity  Goal: Patient/Family Education  5/10/2022 0116 by Savanna Montanez RN  Outcome: Progressing Towards Goal  5/10/2022 0114 by Savanna Montanez RN  Outcome: Progressing Towards Goal     Problem: General Medical Care Plan  Goal: *Vital signs within specified parameters  Outcome: Progressing Towards Goal  Goal: *Labs within defined limits  Outcome: Progressing Towards Goal  Goal: *Absence of infection signs and symptoms  Outcome: Progressing Towards Goal  Goal: *Optimal pain control at patient's stated goal  Outcome: Progressing Towards Goal  Goal: *Skin integrity maintained  Outcome: Progressing Towards Goal  Goal: *Fluid volume balance  Outcome: Progressing Towards Goal  Goal: *Optimize nutritional status  Outcome: Progressing Towards Goal  Goal: *Anxiety reduced or absent  Outcome: Progressing Towards Goal  Goal: *Progressive mobility and function (eg: ADL's)  Outcome: Progressing Towards Goal     Problem: Patient Education: Go to Patient Education Activity  Goal: Patient/Family Education  Outcome: Progressing Towards Goal

## 2022-05-10 NOTE — H&P
Hospitalist Admission Note    NAME: Lakeisha Pereira   :  1985   MRN:  [id number]     Date/Time:  2022 11:31 PM    Patient PCP: Christopher Muniz MD  _____________________________________________________________________  Given the patient's current clinical presentation, I have a high level of concern for decompensation if discharged from the emergency department. Complex decision making was performed, which includes reviewing the patient's available past medical records, laboratory results, and x-ray films. My assessment of this patient's clinical condition and my plan of care is as follows. Assessment / Plan:  1. Acute pulmonary embolism: In all lobes. Agree with heparin drip. Will need further workup which can be done as outpt. Will need oral anti-coagulation for at least 6 months. 2. Elevated troponin: In the absence of chest pain and with no EKG changes, most likely from acute PE. Echocardiogram ordered, continue telemetry and repeat troponin. Cardiology consult in AM routinely. 3. DVT prophylaxis: Continue heparin drip as ordered. Doppler LE bilaterally    Code Status: FULL    I performed this consultation using real-amadou telehealth tools including a live video connection between my location and the patient's location. As the provider for this telehealth service, I attest that I introduced myself to the patient, provided my credentials, disclosed my location and determined that based on my review of patient's chart and/or discussion with members of the patient's treatment team, telemedicine via real time, 2 way, interactive audio and video platform is an appropriate and effective means of providing service. The patient and I mutually agree that this visit is appropriate for telemedicine as well. Subjective:   CHIEF COMPLAINT:  Shortness of breath    HISTORY OF PRESENT ILLNESS:     Lakeisha Pereira is a 43 y.o.    female who presents with shortness of breath since last Friday. She denies CP, no associated cough, no fever and no chills. No travel hx and no sick contacts. Chart reviewed and discussed with patient. She had COVID 19 infection in December 2021. We were asked to admit for work up and evaluation of the above problems. Past Medical History:   Diagnosis Date    Psychiatric disorder     depression    Psychiatric disorder     anxiety        Past Surgical History:   Procedure Laterality Date    HX ORTHOPAEDIC  2012    left shoulder       Social History     Tobacco Use    Smoking status: Former Smoker    Smokeless tobacco: Never Used   Substance Use Topics    Alcohol use: Not Currently        History reviewed. No pertinent family history. No Known Allergies     Prior to Admission medications    Medication Sig Start Date End Date Taking? Authorizing Provider   sertraline (Zoloft) 100 mg tablet Take 100 mg by mouth daily. Yes Other, MD Christopher   clonazePAM (KlonoPIN) 1 mg tablet Take 1 mg by mouth two (2) times a day. Yes Other, MD Christopher   buprenorphine-naloxone 8-2 mg subl 2 Tablets by SubLINGual route daily. Other, MD Christopher   naproxen (NAPROSYN) 500 mg tablet Take 1 Tablet by mouth two (2) times daily (with meals).  7/22/21   Sharon Burgess MD       REVIEW OF SYSTEMS:     Total of 12 systems reviewed as follows:       POSITIVE= underlined text  Negative = text not underlined  General:  fever, chills, sweats, generalized weakness, weight loss/gain,      loss of appetite   Eyes:    blurred vision, eye pain, loss of vision, double vision  ENT:    rhinorrhea, pharyngitis   Respiratory:   cough, sputum production, SOB, MCC[place], wheezing, pleuritic pain   Cardiology:   chest pain, palpitations, orthopnea, PND, edema, syncope   Gastrointestinal:  abdominal pain , N/V, diarrhea, dysphagia, constipation, bleeding   Genitourinary:  frequency, urgency, dysuria, hematuria, incontinence   Muskuloskeletal :  arthralgia, myalgia, back pain  Hematology:  easy bruising, nose or gum bleeding, lymphadenopathy   Dermatological: rash, ulceration, pruritis, color change / jaundice  Endocrine:   hot flashes or polydipsia   Neurological:  headache, dizziness, confusion, focal weakness, paresthesia,     Speech difficulties, memory loss, gait difficulty  Psychological: Feelings of anxiety, depression, agitation    Objective:   VITALS:    Visit Vitals  /69   Pulse 91   Temp 97.7 °F (36.5 °C)   Resp 19   Ht 5' 2\" (1.575 m)   Wt 113.4 kg (250 lb)   SpO2 99%   BMI 45.73 kg/m²       PHYSICAL EXAM: Performed via telecart with nurse assistance  General:    Alert, cooperative, no distress, appears stated age. HEENT: Atraumatic, anicteric sclerae, pink conjunctivae     No oral ulcers, mucosa moist, throat clear, dentition fair  Neck:  Supple, symmetrical,  thyroid: non tender  Lungs:   Clear to auscultation bilaterally. No Wheezing or Rhonchi. No rales. Chest wall:  No tenderness  No Accessory muscle use. Heart:   Regular  rhythm,  No  murmur   1+ edema  Abdomen:   Soft, non-tender. Not distended. Bowel sounds normal  Extremities: No cyanosis. No clubbing,      Skin turgor normal, Capillary refill normal, Radial dial pulse 2+  Skin:     Not pale. Not Jaundiced  No rashes   Psych:  Good insight. Not depressed. Not anxious or agitated. Neurologic: EOMs intact. No facial asymmetry. No aphasia or slurred speech. Symmetrical strength, Sensation grossly intact.  Alert and oriented X 4.     _______________________________________________________________________  Care Plan discussed with:    Comments   Patient x    Family      RN x    Care Manager                    Consultant:  x ER provider Dr. Jessica Vasquez   _______________________________________________________________________  Expected  Disposition:   Home with Family x   HH/PT/OT/RN    SNF/LTC    AMIE    ________________________________________________________________________  TOTAL TIME: 39 Minutes        Comments    x Reviewed previous records   >50% of visit spent in counseling and coordination of care x Discussion with patient and/or family and questions answered       Given the patient's current clinical presentation, I have a high level of concern for decompensation if discharged from the ED. Complex decision making was performed which includes reviewing the patient's available past medical records, laboratory results, and Xray films. I have also directly communicated my plan and discussed this case with the involved ED physician.     ____________________________________________________________________  Giselle Domingo MD   [employer]    Procedures: see electronic medical records for all procedures/Xrays and details which were not copied into this note but were reviewed prior to creation of Plan. LAB DATA REVIEWED:    Recent Results (from the past 24 hour(s))   CBC WITH AUTOMATED DIFF    Collection Time: 05/09/22  7:47 PM   Result Value Ref Range    WBC 8.2 3.6 - 11.0 K/uL    RBC 4.38 3.80 - 5.20 M/uL    HGB 11.7 11.5 - 16.0 g/dL    HCT 37.8 35.0 - 47.0 %    MCV 86.3 80.0 - 99.0 FL    MCH 26.7 26.0 - 34.0 PG    MCHC 31.0 30.0 - 36.5 g/dL    RDW 16.3 (H) 11.5 - 14.5 %    PLATELET 454 343 - 210 K/uL    MPV 10.5 8.9 - 12.9 FL    NRBC 0.0 0  WBC    ABSOLUTE NRBC 0.00 0.00 - 0.01 K/uL    NEUTROPHILS 67 32 - 75 %    LYMPHOCYTES 25 12 - 49 %    MONOCYTES 5 5 - 13 %    EOSINOPHILS 3 0 - 7 %    BASOPHILS 0 0 - 1 %    IMMATURE GRANULOCYTES 0 0.0 - 0.5 %    ABS. NEUTROPHILS 5.4 1.8 - 8.0 K/UL    ABS. LYMPHOCYTES 2.1 0.8 - 3.5 K/UL    ABS. MONOCYTES 0.4 0.0 - 1.0 K/UL    ABS. EOSINOPHILS 0.2 0.0 - 0.4 K/UL    ABS. BASOPHILS 0.0 0.0 - 0.1 K/UL    ABS. IMM.  GRANS. 0.0 0.00 - 0.04 K/UL    DF AUTOMATED     METABOLIC PANEL, COMPREHENSIVE    Collection Time: 05/09/22  7:47 PM   Result Value Ref Range    Sodium 140 136 - 145 mmol/L    Potassium 4.5 3.5 - 5.1 mmol/L    Chloride 105 97 - 108 mmol/L    CO2 28 21 - 32 mmol/L Anion gap 7 5 - 15 mmol/L    Glucose 97 65 - 100 mg/dL    BUN 9 6 - 20 MG/DL    Creatinine 0.89 0.55 - 1.02 MG/DL    BUN/Creatinine ratio 10 (L) 12 - 20      GFR est AA >60 >60 ml/min/1.73m2    GFR est non-AA >60 >60 ml/min/1.73m2    Calcium 8.9 8.5 - 10.1 MG/DL    Bilirubin, total 0.3 0.2 - 1.0 MG/DL    ALT (SGPT) 15 12 - 78 U/L    AST (SGOT) 23 15 - 37 U/L    Alk.  phosphatase 73 45 - 117 U/L    Protein, total 7.9 6.4 - 8.2 g/dL    Albumin 3.5 3.5 - 5.0 g/dL    Globulin 4.4 (H) 2.0 - 4.0 g/dL    A-G Ratio 0.8 (L) 1.1 - 2.2     TROPONIN-HIGH SENSITIVITY    Collection Time: 05/09/22  7:47 PM   Result Value Ref Range    Troponin-High Sensitivity 131 (HH) 0 - 51 ng/L   D DIMER    Collection Time: 05/09/22  7:47 PM   Result Value Ref Range    D-dimer 5.99 (H) 0.00 - 0.65 mg/L FEU   HCG QL SERUM    Collection Time: 05/09/22  7:47 PM   Result Value Ref Range    HCG, Ql. Negative NEG     PTT    Collection Time: 05/09/22  7:47 PM   Result Value Ref Range    aPTT 21.0 (L) 22.1 - 31.0 sec    aPTT, therapeutic range     58.0 - 77.0 SECS   EKG, 12 LEAD, INITIAL    Collection Time: 05/09/22  8:29 PM   Result Value Ref Range    Ventricular Rate 86 BPM    Atrial Rate 86 BPM    P-R Interval 168 ms    QRS Duration 98 ms    Q-T Interval 402 ms    QTC Calculation (Bezet) 481 ms    Calculated P Axis 47 degrees    Calculated R Axis 50 degrees    Calculated T Axis 16 degrees    Diagnosis       Normal sinus rhythm  T wave abnormality, consider anterior ischemia  Prolonged QT  When compared with ECG of 12-APR-2022 12:50,  Questionable change in QRS axis  Inverted T waves have replaced nonspecific T wave abnormality in Anterior   leads

## 2022-05-10 NOTE — ED PROVIDER NOTES
EMERGENCY DEPARTMENT HISTORY AND PHYSICAL EXAM      Date: 5/9/2022  Patient Name: Harvey Saeed  Patient Age and Sex: 43 y.o. female  MRN:  [id number]  CSN:  [id number]    History of Presenting Illness     Chief Complaint   Patient presents with    Shortness of Breath       History Provided By: Patient    Ability to gather history was limited by:     HPI: Harvey Saeed, 43 y.o. female with history of anxiety and depression, no pulmonary medical issues, complains of sensation of shortness of breath and feeling winded with exertion, for the past 4 days or so. No chest pain. No leg swelling or fevers or sputum or cough or wheezing. Unvaccinated for Lisa. Symptoms are mild to moderate severity. Location:    Quality:      Severity:    Duration:   Timing:      Context:    Modifying factors:   Associated symptoms:     Past History      The patient's medical, surgical, and social history on file were reviewed by me today.  The family history was reviewed by me today and was non-contributory, unless otherwise specified below:    Past Medical History:  Past Medical History:   Diagnosis Date    Psychiatric disorder     depression    Psychiatric disorder     anxiety       Past Surgical History:  Past Surgical History:   Procedure Laterality Date    HX ORTHOPAEDIC  2012    left shoulder       Family History:  History reviewed. No pertinent family history. Social History:  Social History     Tobacco Use    Smoking status: Former Smoker    Smokeless tobacco: Never Used   Vaping Use    Vaping Use: Never used   Substance Use Topics    Alcohol use: Not Currently    Drug use: Not Currently       Current Medications:  No current facility-administered medications on file prior to encounter. Current Outpatient Medications on File Prior to Encounter   Medication Sig Dispense Refill    sertraline (Zoloft) 100 mg tablet Take 100 mg by mouth daily.       clonazePAM (KlonoPIN) 1 mg tablet Take 1 mg by mouth two (2) times a day.  buprenorphine-naloxone 8-2 mg subl 2 Tablets by SubLINGual route daily.  naproxen (NAPROSYN) 500 mg tablet Take 1 Tablet by mouth two (2) times daily (with meals). 20 Tablet 0       Allergies:  No Known Allergies  Review of Systems    A complete ROS was reviewed by me today and was negative, unless otherwise specified below:    Review of Systems   Constitutional: Positive for fatigue. Negative for fever. Respiratory: Positive for shortness of breath. Cardiovascular: Negative for chest pain, palpitations and leg swelling. Gastrointestinal: Negative for abdominal pain. All other systems reviewed and are negative. Physical Exam   Vital Signs  Patient Vitals for the past 8 hrs:   Temp Pulse Resp BP SpO2   05/09/22 2221  91 19 116/69 99 %   05/09/22 1842     96 %   05/09/22 1804 97.7 °F (36.5 °C) 95 16 (!) 112/95 96 %          Physical Exam  Vitals and nursing note reviewed. Constitutional:       General: She is not in acute distress. Appearance: Normal appearance. She is well-developed. She is not ill-appearing. HENT:      Head: Normocephalic and atraumatic. Mouth/Throat:      Mouth: Mucous membranes are moist.   Eyes:      General:         Right eye: No discharge. Left eye: No discharge. Conjunctiva/sclera: Conjunctivae normal.   Cardiovascular:      Rate and Rhythm: Normal rate and regular rhythm. Heart sounds: Normal heart sounds. No murmur heard. Pulmonary:      Effort: Pulmonary effort is normal. No respiratory distress. Breath sounds: Normal breath sounds. No wheezing. Abdominal:      General: There is no distension. Palpations: Abdomen is soft. Tenderness: There is no abdominal tenderness. Musculoskeletal:         General: No deformity. Normal range of motion. Cervical back: Normal range of motion and neck supple. Skin:     General: Skin is warm and dry. Findings: No rash.    Neurological: General: No focal deficit present. Mental Status: She is alert and oriented to person, place, and time. Psychiatric:         Speech: Speech normal.         Behavior: Behavior normal.         Cognition and Memory: Cognition normal.         Diagnostic Study Results   Labs  Recent Results (from the past 24 hour(s))   CBC WITH AUTOMATED DIFF    Collection Time: 05/09/22  7:47 PM   Result Value Ref Range    WBC 8.2 3.6 - 11.0 K/uL    RBC 4.38 3.80 - 5.20 M/uL    HGB 11.7 11.5 - 16.0 g/dL    HCT 37.8 35.0 - 47.0 %    MCV 86.3 80.0 - 99.0 FL    MCH 26.7 26.0 - 34.0 PG    MCHC 31.0 30.0 - 36.5 g/dL    RDW 16.3 (H) 11.5 - 14.5 %    PLATELET 936 916 - 193 K/uL    MPV 10.5 8.9 - 12.9 FL    NRBC 0.0 0  WBC    ABSOLUTE NRBC 0.00 0.00 - 0.01 K/uL    NEUTROPHILS 67 32 - 75 %    LYMPHOCYTES 25 12 - 49 %    MONOCYTES 5 5 - 13 %    EOSINOPHILS 3 0 - 7 %    BASOPHILS 0 0 - 1 %    IMMATURE GRANULOCYTES 0 0.0 - 0.5 %    ABS. NEUTROPHILS 5.4 1.8 - 8.0 K/UL    ABS. LYMPHOCYTES 2.1 0.8 - 3.5 K/UL    ABS. MONOCYTES 0.4 0.0 - 1.0 K/UL    ABS. EOSINOPHILS 0.2 0.0 - 0.4 K/UL    ABS. BASOPHILS 0.0 0.0 - 0.1 K/UL    ABS. IMM. GRANS. 0.0 0.00 - 0.04 K/UL    DF AUTOMATED     METABOLIC PANEL, COMPREHENSIVE    Collection Time: 05/09/22  7:47 PM   Result Value Ref Range    Sodium 140 136 - 145 mmol/L    Potassium 4.5 3.5 - 5.1 mmol/L    Chloride 105 97 - 108 mmol/L    CO2 28 21 - 32 mmol/L    Anion gap 7 5 - 15 mmol/L    Glucose 97 65 - 100 mg/dL    BUN 9 6 - 20 MG/DL    Creatinine 0.89 0.55 - 1.02 MG/DL    BUN/Creatinine ratio 10 (L) 12 - 20      GFR est AA >60 >60 ml/min/1.73m2    GFR est non-AA >60 >60 ml/min/1.73m2    Calcium 8.9 8.5 - 10.1 MG/DL    Bilirubin, total 0.3 0.2 - 1.0 MG/DL    ALT (SGPT) 15 12 - 78 U/L    AST (SGOT) 23 15 - 37 U/L    Alk.  phosphatase 73 45 - 117 U/L    Protein, total 7.9 6.4 - 8.2 g/dL    Albumin 3.5 3.5 - 5.0 g/dL    Globulin 4.4 (H) 2.0 - 4.0 g/dL    A-G Ratio 0.8 (L) 1.1 - 2.2     TROPONIN-HIGH SENSITIVITY    Collection Time: 05/09/22  7:47 PM   Result Value Ref Range    Troponin-High Sensitivity 131 (HH) 0 - 51 ng/L   D DIMER    Collection Time: 05/09/22  7:47 PM   Result Value Ref Range    D-dimer 5.99 (H) 0.00 - 0.65 mg/L FEU   HCG QL SERUM    Collection Time: 05/09/22  7:47 PM   Result Value Ref Range    HCG, Ql. Negative NEG     EKG, 12 LEAD, INITIAL    Collection Time: 05/09/22  8:29 PM   Result Value Ref Range    Ventricular Rate 86 BPM    Atrial Rate 86 BPM    P-R Interval 168 ms    QRS Duration 98 ms    Q-T Interval 402 ms    QTC Calculation (Bezet) 481 ms    Calculated P Axis 47 degrees    Calculated R Axis 50 degrees    Calculated T Axis 16 degrees    Diagnosis       Normal sinus rhythm  T wave abnormality, consider anterior ischemia  Prolonged QT  When compared with ECG of 12-APR-2022 12:50,  Questionable change in QRS axis  Inverted T waves have replaced nonspecific T wave abnormality in Anterior   leads         Radiologic Studies  CTA CHEST W OR W WO CONT   Final Result   1. There are pulmonary emboli to all lobes. 2. Opacity in the posterior costophrenic sulcus is nonspecific and may represent   infarct, atelectasis and/or airspace disease. .   Findings of this exam were discussed with Herbert Mai by Dr. Norris De Souza by   telephone at approximately, 5/9/2022 9:47 PM.  789                    CT Results  (Last 48 hours)               05/09/22 2133  CTA CHEST W OR W WO CONT Final result    Impression:  1. There are pulmonary emboli to all lobes. 2. Opacity in the posterior costophrenic sulcus is nonspecific and may represent   infarct, atelectasis and/or airspace disease. .   Findings of this exam were discussed with Herbert Mai by Dr. Norris De Souza by   telephone at approximately, 5/9/2022 9:47 PM.  789                       Narrative:  EXAM:  CTA CHEST W OR W WO CONT   INDICATION:  new onset SOB, elevated troponin, very elevated D-dimer, likely PE.    Additional history:   COMPARISON: None. [facility] TECHNIQUE:    Precontrast  images were obtained to localize the volume for acquisition. Multislice helical CT arteriography was performed from the diaphragm to the   thoracic inlet during uneventful rapid bolus intravenous contrast   administration. Lung and soft tissue windows were generated. Coronal and   sagittal images were generated and 3D post processing consisting of coronal   maximum intensity images was performed. CT dose reduction was achieved through use of a standardized protocol tailored   for this examination and automatic exposure control for dose modulation. [facility] FINDINGS:   CHEST:   Chest wall/thoracic inlet: Within normal limits. Thyroid: Within normal limits. Mediastinum/lara: Within normal limits. Heart/vessels: There are pulmonary emboli to all lobes. Lungs/Pleura: Opacity in the posterior costophrenic sulcus. .   INCIDENTALLY IMAGED ABDOMEN:   Small hiatal hernia    . MSK:    Within normal limits.    .           CXR Results  (Last 48 hours)    None          Billable Procedures   Critical Care  Performed by: Verneita Oppenheim, MD  Authorized by: Verneita Oppenheim, MD     Critical care provider statement:     Critical care time (minutes):  35    Critical care time was exclusive of:  Separately billable procedures and treating other patients    Critical care was necessary to treat or prevent imminent or life-threatening deterioration of the following conditions:  Circulatory failure and respiratory failure    Critical care was time spent personally by me on the following activities:  Ordering and review of laboratory studies, ordering and review of radiographic studies, pulse oximetry, re-evaluation of patient's condition, examination of patient, evaluation of patient's response to treatment, ordering and performing treatments and interventions and review of old charts  EKG    Date/Time: 5/9/2022 10:51 PM  Performed by: Verneita Oppenheim, MD  Authorized by: Mae Alford MD     ECG reviewed by ED Physician in the absence of a cardiologist: yes    Interpretation:     Interpretation: non-specific    Rate:     ECG rate assessment: normal    Rhythm:     Rhythm: sinus rhythm    Ectopy:     Ectopy: none    QRS:     QRS axis:  Normal  ST segments:     ST segments:  Normal  T waves:     T waves: normal          Medical Decision Making     I reviewed the patient's most recent Emergency Dept notes and diagnostic tests in formulating my MDM on today's visit. Provider Notes (Medical Decision Making):   26-year-old female presenting with shortness of breath for the past few days, no other associated symptoms such as leg swelling or cough or chest pain. Clinically well-appearing, normal vital signs, afebrile. Normal oxygen saturation. Lung sounds are clear. Her laboratories were notable for D-dimer of 5.99 with troponin of 131, strongly suggestive of an acute PE. I obtained CTA which demonstrated BILATERAL pulmonary emboli in ALL LOBES. Pt started on heparin infusion. Admit telemetry. 35 minutes critical care.     Gilford Chuck, MD  8:16 PM  5/9/2022     Consults:    Social History     Tobacco Use    Smoking status: Former Smoker    Smokeless tobacco: Never Used   Vaping Use    Vaping Use: Never used   Substance Use Topics    Alcohol use: Not Currently    Drug use: Not Currently       Medications Administered during ED course:  Medications   heparin 25,000 units in D5W 250 ml infusion (17 Units/kg/hr × 113.4 kg IntraVENous New Bag 5/9/22 2218)   heparin (porcine) 1,000 unit/mL injection 4,540 Units (has no administration in time range)     Or   heparin (porcine) 1,000 unit/mL injection 9,070 Units (has no administration in time range)   iopamidoL (ISOVUE-370) 76 % injection 80 mL (80 mL IntraVENous Given 5/9/22 2133)   heparin (porcine) 1,000 unit/mL injection 9,070 Units (9,070 Units IntraVENous Given 5/9/22 2158)          Prescriptions from today's ED visit:  Current Discharge Medication List         Diagnosis and Disposition     Disposition:  Admitted    Clinical Impression:   1. Bilateral pulmonary embolism (HCC)        Attestation:  I personally performed the services described in this documentation on this date 5/9/2022 for patient Keyana Shannon. Michael Velazco MD        I was the first provider for this patient on this visit. To the best of my ability I reviewed relevant prior medical records, electrocardiograms, laboratories, and radiologic studies. The patient's presenting problems were discussed, and the patient was in agreement with the care plan formulated and outlined with them. Michael Velazco MD    Please note that this dictation was completed with Dragon voice recognition software. Quite often unanticipated grammatical, syntax, homophones, and other interpretive errors are inadvertently transcribed by the computer software. Please disregard these errors and excuse any errors that have escaped final proofreading.

## 2022-05-10 NOTE — PROGRESS NOTES
Hospitalist Progress Note    NAME: Dorinda Duval   :  1985   MRN:  [id number]   Room Number:  UXM6/27  @ [facility] Summary: 43 y.o. female whom presented on 2022 with      Assessment / Plan:  Anticipated discharge date :    Anticipated disposition : Home  Barriers to discharge : medical stability     Acute pulmonary embolism POA  CTA showed PE In all lobes. - switch to rivaroxaban   - Duplex lower ext  - telemetry  - ECHO  - Needs hypercoagulable work up as outpatient        Elevated troponin POA  EKG interpreted independently : normal sinus rhythm, no acute ST-T changes suggestive of ischemia. Likely due to acute PE. Downtrending  - check ECHO            Depression POA  Anxiety POA  - resume home meds      Opioid dependence POA  - continue suboxone       Subjective:     Chief Complaint / Reason for Physician Visit  \"I still feel short of breath\". Discussed with RN events overnight. Review of Systems:  No fevers, chills, appetite change, cough, sputum production, shortness of breath, dyspnea on exertion, nausea, vomitting, diarrhea, constipation, chest pain, leg edema, abdominal pain, joint pain, rash, itching. Tolerating PT/OT. Tolerating diet. Objective:     VITALS:   Last 24hrs VS reviewed since prior progress note.  Most recent are:  Patient Vitals for the past 24 hrs:   Temp Pulse Resp BP SpO2   05/10/22 1600 98.2 °F (36.8 °C) 87 15 110/69 96 %   05/10/22 1400  89 15 137/73 98 %   05/10/22 1200 97.8 °F (36.6 °C) 84 20 (!) 103/90 95 %   05/10/22 0821 98.1 °F (36.7 °C) 86 23 115/74 95 %   05/10/22 0800  84      05/10/22 0400 97.1 °F (36.2 °C) 81 16 115/65 95 %   05/10/22 0044 97.5 °F (36.4 °C) 86 20 100/76 97 %   05/10/22 0000  86      22 2300  89 16 106/77 97 %   22 2221  91 19 116/69 99 %   22 1842     96 %       Intake/Output Summary (Last 24 hours) at 5/10/2022 1814  Last data filed at 5/10/2022 0044  Gross per 24 hour   Intake 480 ml   Output    Net 480 ml        PHYSICAL EXAM:  General: WD, WN. Alert, cooperative, no acute distress    EENT:  EOMI. Anicteric sclerae. MMM  Resp:  CTA bilaterally, no wheezing or rales. No accessory muscle use  CV:  Regular  rhythm,  normal S1/S2, no murmurs rubs gallops, No edema  GI:  Soft, Non distended, Non tender. +Bowel sounds  Neurologic:  Alert and oriented X 3, normal speech,   Psych:   Good insight. Not anxious nor agitated  Skin:  No rashes. No jaundice    Reviewed most current lab test results and cultures  YES  Reviewed most current radiology test results   YES  Review and summation of old records today    NO  Reviewed patient's current orders and MAR    YES  PMH/SH reviewed - no change compared to H&P  ________________________________________________________________________  Care Plan discussed with:    Comments   Patient x    Family      RN x    Care Manager x    Consultant                       x Multidiciplinary team rounds were held today with , nursing, pharmacist and clinical coordinator. Patient's plan of care was discussed; medications were reviewed and discharge planning was addressed. ________________________________________________________________________  Total NON critical care TIME:25   Minutes    Total CRITICAL CARE TIME Spent:   Minutes non procedure based      Comments   >50% of visit spent in counseling and coordination of care     ________________________________________________________________________  Kena Rincon MD     Procedures: see electronic medical records for all procedures/Xrays and details which were not copied into this note but were reviewed prior to creation of Plan. LABS:  I reviewed today's most current labs and imaging studies.   Pertinent labs include:  Recent Labs     05/10/22  0345 05/09/22  1947   WBC 8.8 8.2   HGB 10.6* 11.7   HCT 34.9* 37.8    336     Recent Labs     05/10/22  0345 05/09/22 1947    140   K 3.7 4.5    105   CO2 27 28   GLU 98 97   BUN 11 9   CREA 0.94 0.89   CA 8.1* 8.9   MG 2.1  --    ALB 3.0* 3.5   TBILI 0.3 0.3   ALT 16 15       Signed: Sid Espinoza MD

## 2022-05-10 NOTE — PROGRESS NOTES
Care plan reviewed, patient progressing towards goals.   Problem: Pulmonary Embolism Care Plan (Adult)  Goal: *Improvement of existing pulmonary embolism  Outcome: Progressing Towards Goal  Goal: *Absence of bleeding  Outcome: Progressing Towards Goal  Goal: *Labs within defined limits  Outcome: Progressing Towards Goal     Problem: Patient Education: Go to Patient Education Activity  Goal: Patient/Family Education  Outcome: Progressing Towards Goal     Problem: General Medical Care Plan  Goal: *Vital signs within specified parameters  Outcome: Progressing Towards Goal  Goal: *Labs within defined limits  Outcome: Progressing Towards Goal  Goal: *Absence of infection signs and symptoms  Outcome: Progressing Towards Goal  Goal: *Optimal pain control at patient's stated goal  Outcome: Progressing Towards Goal  Goal: *Skin integrity maintained  Outcome: Progressing Towards Goal  Goal: *Fluid volume balance  Outcome: Progressing Towards Goal  Goal: *Optimize nutritional status  Outcome: Progressing Towards Goal  Goal: *Anxiety reduced or absent  Outcome: Progressing Towards Goal  Goal: *Progressive mobility and function (eg: ADL's)  Outcome: Progressing Towards Goal     Problem: Patient Education: Go to Patient Education Activity  Goal: Patient/Family Education  Outcome: Progressing Towards Goal

## 2022-05-10 NOTE — ED NOTES
Verbal shift change report given to Isabella Bull, RN (oncoming nurse) by Vanessa Damico RN (offgoing nurse). Report included the following information SBAR, Kardex, ED Summary, [facility] and Recent Results.

## 2022-05-11 VITALS
DIASTOLIC BLOOD PRESSURE: 67 MMHG | OXYGEN SATURATION: 96 % | WEIGHT: 250 LBS | RESPIRATION RATE: 21 BRPM | SYSTOLIC BLOOD PRESSURE: 111 MMHG | HEIGHT: 62 IN | HEART RATE: 91 BPM | TEMPERATURE: 98.2 F | BODY MASS INDEX: 46.01 KG/M2

## 2022-05-11 LAB
ANION GAP SERPL CALC-SCNC: 10 MMOL/L (ref 5–15)
BASOPHILS # BLD: 0 K/UL (ref 0–0.1)
BASOPHILS NFR BLD: 0 % (ref 0–1)
BUN SERPL-MCNC: 8 MG/DL (ref 6–20)
BUN/CREAT SERPL: 10 (ref 12–20)
CALCIUM SERPL-MCNC: 8.7 MG/DL (ref 8.5–10.1)
CHLORIDE SERPL-SCNC: 105 MMOL/L (ref 97–108)
CO2 SERPL-SCNC: 27 MMOL/L (ref 21–32)
CREAT SERPL-MCNC: 0.79 MG/DL (ref 0.55–1.02)
DIFFERENTIAL METHOD BLD: ABNORMAL
ECHO AO ROOT DIAM: 3.2 CM
ECHO AO ROOT INDEX: 1.52 CM/M2
ECHO LA DIAMETER INDEX: 1.62 CM/M2
ECHO LA DIAMETER: 3.4 CM
ECHO LA TO AORTIC ROOT RATIO: 1.06
ECHO LA VOL 4C: 35 ML (ref 22–52)
ECHO LA VOLUME INDEX A4C: 17 ML/M2 (ref 16–34)
ECHO LV EDV A4C: 118 ML
ECHO LV EDV INDEX A4C: 56 ML/M2
ECHO LV EJECTION FRACTION A4C: 55 %
ECHO LV ESV A4C: 53 ML
ECHO LV ESV INDEX A4C: 25 ML/M2
ECHO LV FRACTIONAL SHORTENING: 27 % (ref 28–44)
ECHO LV INTERNAL DIMENSION DIASTOLE INDEX: 2.1 CM/M2
ECHO LV INTERNAL DIMENSION DIASTOLIC: 4.4 CM (ref 3.9–5.3)
ECHO LV INTERNAL DIMENSION SYSTOLIC INDEX: 1.52 CM/M2
ECHO LV INTERNAL DIMENSION SYSTOLIC: 3.2 CM
ECHO LV IVSD: 0.9 CM (ref 0.6–0.9)
ECHO LV MASS 2D: 137.8 G (ref 67–162)
ECHO LV MASS INDEX 2D: 65.6 G/M2 (ref 43–95)
ECHO LV POSTERIOR WALL DIASTOLIC: 1 CM (ref 0.6–0.9)
ECHO LV RELATIVE WALL THICKNESS RATIO: 0.45
ECHO LVOT AREA: 2 CM2
ECHO LVOT DIAM: 1.6 CM
ECHO LVOT PEAK GRADIENT: 4 MMHG
ECHO LVOT PEAK VELOCITY: 1 M/S
ECHO MV A VELOCITY: 0.93 M/S
ECHO MV AREA INDEX PLAN: 2.1 CM2/M2
ECHO MV AREA PHT: 4 CM2
ECHO MV AREA PLAN: 4.5 CM2
ECHO MV E DECELERATION TIME (DT): 97.3 MS
ECHO MV E VELOCITY: 0.38 M/S
ECHO MV E/A RATIO: 0.41
ECHO MV MAX VELOCITY: 1.1 M/S
ECHO MV MEAN GRADIENT: 1 MMHG
ECHO MV MEAN VELOCITY: 0.5 M/S
ECHO MV PEAK GRADIENT: 5 MMHG
ECHO MV PRESSURE HALF TIME (PHT): 54.4 MS
ECHO MV VTI: 17.5 CM
ECHO PULMONARY ARTERY END DIASTOLIC PRESSURE: 14 MMHG
ECHO TV REGURGITANT MAX VELOCITY: 2.28 M/S
ECHO TV REGURGITANT PEAK GRADIENT: 21 MMHG
EOSINOPHIL # BLD: 0.2 K/UL (ref 0–0.4)
EOSINOPHIL NFR BLD: 3 % (ref 0–7)
ERYTHROCYTE [DISTWIDTH] IN BLOOD BY AUTOMATED COUNT: 16.3 % (ref 11.5–14.5)
GLUCOSE SERPL-MCNC: 106 MG/DL (ref 65–100)
HCT VFR BLD AUTO: 35.1 % (ref 35–47)
HGB BLD-MCNC: 10.7 G/DL (ref 11.5–16)
IMM GRANULOCYTES # BLD AUTO: 0 K/UL (ref 0–0.04)
IMM GRANULOCYTES NFR BLD AUTO: 0 % (ref 0–0.5)
LYMPHOCYTES # BLD: 1.5 K/UL (ref 0.8–3.5)
LYMPHOCYTES NFR BLD: 22 % (ref 12–49)
MAGNESIUM SERPL-MCNC: 2.1 MG/DL (ref 1.6–2.4)
MCH RBC QN AUTO: 26.1 PG (ref 26–34)
MCHC RBC AUTO-ENTMCNC: 30.5 G/DL (ref 30–36.5)
MCV RBC AUTO: 85.6 FL (ref 80–99)
MONOCYTES # BLD: 0.4 K/UL (ref 0–1)
MONOCYTES NFR BLD: 6 % (ref 5–13)
NEUTS SEG # BLD: 4.9 K/UL (ref 1.8–8)
NEUTS SEG NFR BLD: 69 % (ref 32–75)
NRBC # BLD: 0 K/UL (ref 0–0.01)
NRBC BLD-RTO: 0 PER 100 WBC
PHOSPHATE SERPL-MCNC: 4.7 MG/DL (ref 2.6–4.7)
PLATELET # BLD AUTO: 319 K/UL (ref 150–400)
PMV BLD AUTO: 10.6 FL (ref 8.9–12.9)
POTASSIUM SERPL-SCNC: 3.7 MMOL/L (ref 3.5–5.1)
RBC # BLD AUTO: 4.1 M/UL (ref 3.8–5.2)
RBC MORPH BLD: ABNORMAL
SODIUM SERPL-SCNC: 142 MMOL/L (ref 136–145)
WBC # BLD AUTO: 7 K/UL (ref 3.6–11)

## 2022-05-11 PROCEDURE — 83735 ASSAY OF MAGNESIUM: CPT

## 2022-05-11 PROCEDURE — 74011000250 HC RX REV CODE- 250: Performed by: INTERNAL MEDICINE

## 2022-05-11 PROCEDURE — G0378 HOSPITAL OBSERVATION PER HR: HCPCS

## 2022-05-11 PROCEDURE — 36415 COLL VENOUS BLD VENIPUNCTURE: CPT

## 2022-05-11 PROCEDURE — 80048 BASIC METABOLIC PNL TOTAL CA: CPT

## 2022-05-11 PROCEDURE — 74011250636 HC RX REV CODE- 250/636: Performed by: STUDENT IN AN ORGANIZED HEALTH CARE EDUCATION/TRAINING PROGRAM

## 2022-05-11 PROCEDURE — 85025 COMPLETE CBC W/AUTO DIFF WBC: CPT

## 2022-05-11 PROCEDURE — 93306 TTE W/DOPPLER COMPLETE: CPT | Performed by: SPECIALIST

## 2022-05-11 PROCEDURE — 74011250637 HC RX REV CODE- 250/637: Performed by: STUDENT IN AN ORGANIZED HEALTH CARE EDUCATION/TRAINING PROGRAM

## 2022-05-11 PROCEDURE — 84100 ASSAY OF PHOSPHORUS: CPT

## 2022-05-11 PROCEDURE — 74011250637 HC RX REV CODE- 250/637: Performed by: INTERNAL MEDICINE

## 2022-05-11 RX ADMIN — CLONAZEPAM 1 MG: 0.5 TABLET ORAL at 09:59

## 2022-05-11 RX ADMIN — RIVAROXABAN 15 MG: 15 TABLET, FILM COATED ORAL at 09:58

## 2022-05-11 RX ADMIN — SODIUM CHLORIDE, PRESERVATIVE FREE 10 ML: 5 INJECTION INTRAVENOUS at 06:33

## 2022-05-11 RX ADMIN — SERTRALINE 200 MG: 50 TABLET, FILM COATED ORAL at 09:58

## 2022-05-11 RX ADMIN — ACETAMINOPHEN 650 MG: 325 TABLET ORAL at 09:59

## 2022-05-11 RX ADMIN — BUPRENORPHINE HYDROCHLORIDE AND NALOXONE HYDROCHLORIDE DIHYDRATE 3 TABLET: 8; 2 TABLET SUBLINGUAL at 09:58

## 2022-05-11 NOTE — PROGRESS NOTES
Pharmacist Discharge Medication Reconciliation    Discharge Provider:  Joanne Rivera       Discharge Medications:      My Medications        START taking these medications        Instructions Each Dose to Equal Morning Noon Evening Bedtime   rivaroxaban 15 mg (42)- 20 mg (9) Dspk  Commonly known as: [facility] [employer]    Your last dose was: Your next dose is: Take one 15 mg tablet twice a day with food for the first 21 days. Then, take one 20 mg tablet once a day with food for 9 days. CONTINUE taking these medications        Instructions Each Dose to Equal Morning Noon Evening Bedtime   buprenorphine-naloxone 8-2 mg Subl    Your last dose was: Your next dose is:         3 Tablets by SubLINGual route daily. 3 Tablet                 KlonoPIN 1 mg tablet  Generic drug: clonazePAM    Your last dose was: Your next dose is: Take 1 mg by mouth two (2) times a day. 1 mg                 Zoloft 100 mg tablet  Generic drug: sertraline    Your last dose was: Your next dose is: Take 200 mg by mouth daily. 200 mg                           Where to Get Your Medications        These medications were sent to [address] [place] [address]  [address] [id number]      Phone: [phone]   rivaroxaban 15 mg (42)- 20 mg (9) Dspk          Medication Counseling: The purpose, expected benefits, and side effects of rivaroxaban were discussed with the patient. The patient verbalized understanding of the information presented. The patient was counseled to continue rivaroxaban until instructed to stop by physician.      The patient's chart, MAR, and AVS were reviewed by   Davon Yen, MIKAEL, BCPS

## 2022-05-11 NOTE — PROGRESS NOTES
Care plan reviewed, discharge criteria met, care plan goals resolved.     Problem: Pulmonary Embolism Care Plan (Adult)  Goal: *Improvement of existing pulmonary embolism  Outcome: Resolved/Met  Goal: *Absence of bleeding  Outcome: Resolved/Met  Goal: *Labs within defined limits  Outcome: Resolved/Met     Problem: Patient Education: Go to Patient Education Activity  Goal: Patient/Family Education  Outcome: Resolved/Met     Problem: General Medical Care Plan  Goal: *Vital signs within specified parameters  Outcome: Resolved/Met  Goal: *Labs within defined limits  Outcome: Resolved/Met  Goal: *Absence of infection signs and symptoms  Outcome: Resolved/Met  Goal: *Optimal pain control at patient's stated goal  Outcome: Resolved/Met  Goal: *Skin integrity maintained  Outcome: Resolved/Met  Goal: *Fluid volume balance  Outcome: Resolved/Met  Goal: *Optimize nutritional status  Outcome: Resolved/Met  Goal: *Anxiety reduced or absent  Outcome: Resolved/Met  Goal: *Progressive mobility and function (eg: ADL's)  Outcome: Resolved/Met     Problem: Patient Education: Go to Patient Education Activity  Goal: Patient/Family Education  Outcome: Resolved/Met

## 2022-05-11 NOTE — DISCHARGE SUMMARY
Hospitalist Discharge Summary     Patient ID:  Venkata Chisholm  [id number]  45 y.o.  1985    PCP on record: Christopher Muniz MD    Admit date: 5/9/2022  Discharge date and time: 5/11/2022    Please note that this dictation was completed with [employer], the computer voice recognition software. Quite often unanticipated grammatical, syntax, homophones, and other interpretive errors are inadvertently transcribed by the computer software. Please disregard these errors. Please excuse any errors that have escaped final proofreading. Admission Diagnoses: Acute pulmonary embolism ([address] [I26.99]    Discharge Diagnoses: Active Problems:    Acute pulmonary embolism ([address] (5/9/2022)         Hospital Course:     Acute pulmonary embolism POA  - CTA showed PE In all lobes. -TTE with left ventricular EF 55 to 60%. Right ventricular moderate dilation with moderately reduced systolic function  -Troponin 131 >98   -Duplex with probable age-indeterminate occlusive thrombus in left posterior tibial vein.  -Patient discharged home on Xarelto  -Patient to follow-up with primary care pulm for management of acute pulmonary embolism and outpatient screening for malignancy  - Patient agreed and verbalized understanding                Elevated troponin POA  EKG interpreted independently : normal sinus rhythm, no acute ST-T changes suggestive of ischemia. Likely due to acute PE. Downtrending  -          CONSULTATIONS:  None    Excerpted HPI from H&P of Patricia Lyon MD:    Venkata Chisholm is a 43 y.o.  female who presents with shortness of breath since last Friday. She denies CP, no associated cough, no fever and no chills. No travel hx and no sick contacts. Chart reviewed and discussed with patient. She had COVID 19 infection in December 2021. We were asked to admit for work up and evaluation of the above problems.    ______________________________________________________________________  Leo Simmons SUMMARY/HOSPITAL COURSE:  for full details see H&P, daily progress notes, labs, consult notes. _______________________________________________________________________  Patient seen and examined by me on discharge day. Pertinent Findings:  Gen:    Not in distress  Chest: Clear lungs  CVS:   Regular rhythm. No edema  Abd:  Soft, not distended, not tender  Neuro:  Alert with good insight. Oriented to person, place, and time   _______________________________________________________________________  DISCHARGE MEDICATIONS:   Discharge Medication List as of 5/11/2022 12:23 PM        START taking these medications    Details   rivaroxaban (XARELTO STARTER SKY) 15 mg (42)- 20 mg (9) DsPk Take one 15 mg tablet twice a day with food for the first 21 days. Then, take one 20 mg tablet once a day with food for 9 days. , Normal, Disp-1 Dose Pack, R-0           CONTINUE these medications which have NOT CHANGED    Details   sertraline (Zoloft) 100 mg tablet Take 200 mg by mouth daily. , Historical Med      clonazePAM (KlonoPIN) 1 mg tablet Take 1 mg by mouth two (2) times a day., Historical Med      buprenorphine-naloxone 8-2 mg subl 3 Tablets by SubLINGual route daily. , Historical Med             My Recommended Diet, Activity, Wound Care, and follow-up labs are listed in the patient's Discharge Insturctions which I have personally completed and reviewed.     _______________________________________________________________________  DISPOSITION:     Home with Family: x   Home with HH/PT/OT/RN:    SNF/LTC:    AMIE:    OTHER:        Condition at Discharge:  Stable  _______________________________________________________________________  Follow up with:   PCP : Christopher Muniz MD  Follow-up Information       Follow up With Specialties Details Why Contact Info    Christopher Muniz MD    Patient can only remember the practice name and not the physician                  Total time in minutes spent coordinating this discharge (includes going over instructions, follow-up, prescriptions, and preparing report for sign off to her PCP) :  45 minutes    Signed:  Rylan Collier MD

## 2022-05-11 NOTE — DISCHARGE INSTRUCTIONS
Patient Education        8 Things You Can Do to Help Prevent Blood Clots  A blood clot in a deep vein, usually in the legs, is called a deep vein thrombosis (DVT). A DVT can break loose and travel through the bloodstream to the lungs. Then the clot can block blood flow in the lungs (pulmonary embolism). This can be life-threatening. That's why it's important to take steps to prevent a clot. Take a blood-thinning medicine (called an anticoagulant), if prescribed. If your doctor prescribes medicine, take it as directed. Exercise your lower leg muscles. This helps keep the blood moving through your legs. Pump your feet up and down by pulling your toes up toward your knees then pointing them down. Repeat. This is a good exercise to do when you are sitting for long periods of time. Get up out of bed as soon as your doctor says it's okay. Being active is one of the best things you can do to prevent clots. Take plenty of breaks when you travel. On long car trips, stop the car and walk around every hour or so. On the bus, plane, or train, get out of your seat and walk up and down the aisle every hour, if you can. Be active. Try to get 30 minutes or more of activity on most days of the week. Don't smoke. Smoking can increase your risk of blood clots. If you need help quitting, talk to your doctor about stop-smoking programs and medicines. Check with your doctor about whether you should use hormone forms of birth control or hormone therapy. These may increase your risk of blood clots. Use compression stockings if your doctor prescribes them. These are specially fitted stockings that may prevent blood clots by keeping blood from pooling in your legs. Current as of: July 6, 2021               Content Version: 13.2  © 2006-2022 [employer]. Care instructions adapted under license by [employer] (which disclaims liability or warranty for this information).  If you have questions about a medical condition or this instruction, always ask your healthcare professional. [address] any warranty or liability for your use of this information. Patient Education        Learning About How to Prevent Blood Clots  What is a blood clot? A blood clot is a clump of blood that forms in a blood vessel, such as a vein or an artery. If a clot gets stuck in a blood vessel, it can cause serious problems like a deep vein thrombosis (DVT) or a pulmonary embolism. A DVT is a blood clot in certain veins of the legs, pelvis, or arms. It most often occurs in the legs. Blood clots in these veins need to be treated, because they can get bigger, break loose, and travel through the bloodstream to the heart and then to the lungs. This causes a pulmonary embolism. A pulmonary embolism is a sudden blockage of an artery in the lung. Blood clots in the deep veins of the leg are the most common cause of a pulmonary embolism. In many cases, the clots are small. They may damage the lung. But if the clot is large and stops blood flow to the lung, it can be deadly. What increases your risk for blood clots? Some of the things that can increase your risk for a blood clot include:  Slowed blood flow  When blood doesn't flow normally, clots are more likely to develop. Reduced blood flow may result from long-term bed rest, such as after a surgery, injury, or serious illness. Or it may result from sitting for a long time, especially when traveling long distances. Abnormal clotting  Some people have blood that clots too easily or too quickly. Problems that may cause increased clotting include:  · Having certain blood problems that make blood clot too easily. This is a problem that may run in families. · Having certain health problems, such as cancer, heart failure, stroke, or severe infection. · Being pregnant.  A woman's risk of getting blood clots increases both during pregnancy and shortly after delivery or after a  section. · Using hormonal forms of birth control or hormone therapy. · Smoking. Injury to the blood vessel wall  Blood is more likely to clot in veins and arteries shortly after they are injured. Injury can be caused by a recent medical procedure or surgery that involved your legs, hips, belly, or brain. Or it can be caused by an injury, such as a broken hip. What can you do to prevent blood clots? After any procedure or event that increases your risk  · Take a blood-thinning medicine (called an anticoagulant) as directed if your doctor prescribes one. · Exercise  your lower leg muscles to help keep the blood moving through your legs. Point your toes up toward your head so the calves of your legs are stretched, then relax. Repeat. This is a good exercise to do when you are sitting for long periods of time. · Get up out of bed as soon as you safely can or as soon as your doctor says it's okay after an illness or surgery. If you can't get out of bed, you can do the leg exercise described above. Try to do this leg exercise every hour when you are awake. This will help keep the blood moving through your legs. If you are in the hospital and need to stay in bed, your doctor may have you use a special device that inflates and deflates knee-high boots to help keep blood from pooling in your legs. · Use compression stockings if your doctor prescribes them. These are specially fitted stockings that may prevent blood clots by keeping blood from pooling in your legs. When you travel  · Take breaks when you travel. On long car trips, stop the car and walk around every hour or so. On long bus or train rides or plane flights, get out of your seat and walk up and down the aisle every hour or so. · Do leg exercises while you are seated. For example, pump your feet up and down by pulling your toes up toward your knees and then pointing them down.   If you already have a risk of blood clots, talk to your doctor before taking a long trip. Your doctor may want you to wear compression stockings or take blood-thinning medicine. Take care of your body  · Be active. Try to get 30 minutes or more of activity on most days of the week. · Don't smoke. Smoking can increase your risk of blood clots. If you need help quitting, talk to your doctor about stop-smoking programs and medicines. · Check with your doctor about whether you should use hormonal forms of birth control or hormone therapy. These may increase your risk of blood clots. When should you call for help? Call 911 anytime you think you may need emergency care. For example, call if:  · You have symptoms of a blood clot in your lung (called a pulmonary embolism). These may include:  ? Sudden chest pain. ? Trouble breathing. ? Coughing up blood. Call your doctor now or seek immediate medical care if:  · You have symptoms of a blood clot in your arm or leg (called a deep vein thrombosis). These may include:  ? Pain in the arm, calf, back of the knee, thigh, or groin. ? Redness and swelling in the arm, leg, or groin. Where can you learn more? Go to http://www.gray.com/  Enter F229 in the search box to learn more about \"Learning About How to Prevent Blood Clots. \"  Current as of: January 10, 2022               Content Version: 13.2  © 8822-6953 [employer]. Care instructions adapted under license by [employer] (which disclaims liability or warranty for this information).  If you have questions about a medical condition or this instruction, always ask your healthcare professional. [place] [address] any warranty or liability for your use of this information.       - Follow up PCP for repeat echo

## 2022-05-11 NOTE — PROGRESS NOTES
Transition of Care Plan   RUR- Low 7%    DISPOSITION: The disposition plan is home with family assistance   F/U with PCP/Specialist     Transport: Spouse     Per conversation with the [facility] pharmacist, [phone]; he reported that they have the medication in stock(Xarelto) and it will be filled this afternoon.       CM: [address] [place],   [phone]

## 2022-05-20 ENCOUNTER — TELEPHONE (OUTPATIENT)
Dept: INTERNAL MEDICINE CLINIC | Age: 37
End: 2022-05-20

## 2022-05-20 ENCOUNTER — VIRTUAL VISIT (OUTPATIENT)
Dept: INTERNAL MEDICINE CLINIC | Age: 37
End: 2022-05-20
Payer: COMMERCIAL

## 2022-05-20 DIAGNOSIS — Z76.89 ESTABLISHING CARE WITH NEW DOCTOR, ENCOUNTER FOR: Primary | ICD-10-CM

## 2022-05-20 DIAGNOSIS — I26.99 OTHER PULMONARY EMBOLISM WITHOUT ACUTE COR PULMONALE, UNSPECIFIED CHRONICITY (HCC): ICD-10-CM

## 2022-05-20 DIAGNOSIS — Z76.89 ENCOUNTER FOR SUPPORT AND COORDINATION OF TRANSITION OF CARE: ICD-10-CM

## 2022-05-20 DIAGNOSIS — M54.16 LUMBAR RADICULOPATHY: ICD-10-CM

## 2022-05-20 DIAGNOSIS — Z09 HOSPITAL DISCHARGE FOLLOW-UP: ICD-10-CM

## 2022-05-20 PROCEDURE — 1111F DSCHRG MED/CURRENT MED MERGE: CPT | Performed by: NURSE PRACTITIONER

## 2022-05-20 PROCEDURE — 99204 OFFICE O/P NEW MOD 45 MIN: CPT | Performed by: NURSE PRACTITIONER

## 2022-05-20 RX ORDER — GABAPENTIN 300 MG/1
300 CAPSULE ORAL 2 TIMES DAILY
Qty: 60 CAPSULE | Refills: 0 | Status: SHIPPED | OUTPATIENT
Start: 2022-05-20 | End: 2022-06-15

## 2022-05-20 NOTE — PROGRESS NOTES
Kimberly Kelly is a 43 y.o. female who was seen by synchronous (real-time) audio-video technology on 5/20/2022 for New Patient (here to establish care ), Hospital Follow Up (5/9/2022 [facility] ), Back Pain, and Request For New Medication (for Gabapentin )        Assessment & Plan:   Diagnoses and all orders for this visit:    1. Establishing care with new doctor, encounter for    2. Other pulmonary embolism without acute cor pulmonale, unspecified chronicity (HCC)  -     REFERRAL TO HEMATOLOGY ONCOLOGY    3. Lumbar radiculopathy  -     gabapentin (NEURONTIN) 300 mg capsule; Take 1 Capsule by mouth two (2) times a day. Max Daily Amount: 600 mg.    4. Encounter for support and coordination of transition of care      The complexity of medical decision making for this visit is moderate         Subjective:       Prior to Admission medications    Medication Sig Start Date End Date Taking? Authorizing Provider   gabapentin (NEURONTIN) 300 mg capsule Take 1 Capsule by mouth two (2) times a day. Max Daily Amount: 600 mg. 5/20/22  Yes Tyra Washington, SANTA   rivaroxaban (XARELTO STARTER SKY) 15 mg (42)- 20 mg (9) DsPk Take one 15 mg tablet twice a day with food for the first 21 days. Then, take one 20 mg tablet once a day with food for 9 days. 5/10/22  Yes Dorys Hill MD   sertraline (Zoloft) 100 mg tablet Take 200 mg by mouth daily. Yes Christopher Muniz MD   clonazePAM (KlonoPIN) 1 mg tablet Take 1 mg by mouth two (2) times a day. Yes Christopher Muniz MD   buprenorphine-naloxone 8-2 mg subl 3 Tablets by SubLINGual route daily. Yes Cristy, MD Christopher     Patient Active Problem List   Diagnosis Code    Acute pulmonary embolism ([facility] I26.99       ROS     Est care  She was recently hospitalized from 5/9/22 to 5/11/22 with findings of   . IMPRESSION  1. There are pulmonary emboli to all lobes.    2. Opacity in the posterior costophrenic sulcus is nonspecific and may represent  infarct, atelectasis and/or airspace disease. Unprovoked  Non smoker, no birth control  No known fam hx of clotting disorder      She is taking xarelto, she feels like it has increased her anxiety  She sees psychiatry  She is on suboxone w/ plans to taper down    Back pain: hx of sciatica  Was on gabapentin previously  Pain to low back, radiating to buttocks  Denies saddle numbness, leg weakness, falls or bowel/bladder dysfunction. Objective:   No flowsheet data found. [INSTRUCTIONS:  \"[x]\" Indicates a positive item  \"[]\" Indicates a negative item  -- DELETE ALL ITEMS NOT EXAMINED]    Constitutional: [x] Appears well-developed and well-nourished [x] No apparent distress      [] Abnormal -     Mental status: [x] Alert and awake  [x] Oriented to person/place/time [x] Able to follow commands    [] Abnormal -     Eyes:   EOM    [x]  Normal    [] Abnormal -   Sclera  [x]  Normal    [] Abnormal -          Discharge [x]  None visible   [] Abnormal -     HENT: [x] Normocephalic, atraumatic  [] Abnormal -   [x] Mouth/Throat: Mucous membranes are moist    External Ears [x] Normal  [] Abnormal -    Neck: [x] No visualized mass [] Abnormal -     Pulmonary/Chest: [x] Respiratory effort normal   [x] No visualized signs of difficulty breathing or respiratory distress        [] Abnormal -      Musculoskeletal:   [x] Normal gait with no signs of ataxia         [x] Normal range of motion of neck        [] Abnormal -     Neurological:        [x] No Facial Asymmetry (Cranial nerve 7 motor function) (limited exam due to video visit)          [x] No gaze palsy        [] Abnormal -          Skin:        [x] No significant exanthematous lesions or discoloration noted on facial skin         [] Abnormal -            Psychiatric:       [x] Normal Affect [] Abnormal -        [x] No Hallucinations    Other pertinent observable physical exam findings:-        We discussed the expected course, resolution and complications of the diagnosis(es) in detail.   Medication risks, benefits, costs, interactions, and alternatives were discussed as indicated. I advised her to contact the office if her condition worsens, changes or fails to improve as anticipated. She expressed understanding with the diagnosis(es) and plan. Rita Carrillo, was evaluated through a synchronous (real-time) audio-video encounter. The patient (or guardian if applicable) is aware that this is a billable service, which includes applicable co-pays. Verbal consent to proceed has been obtained. The visit was conducted pursuant to the emergency declaration under the [facility], [address] [place] [address] authority and the [email or web address] General Act. Patient identification was verified, and a caregiver was present when appropriate. The patient was located at home in a state where the provider was licensed to provide care. Carmella Jurado NP

## 2022-05-20 NOTE — TELEPHONE ENCOUNTER
----- Message from [employer] sent at 5/18/2022 11:05 AM EDT -----  Subject: Medication Problem    QUESTIONS  Name of Medication? rivaroxaban (XARELTO STARTER SKY) 15 mg (42)- 20 mg   (9) DsPk  Patient-reported dosage and instructions? 15  What question or problem do you have with the medication? Patient has   stated that the medication is making her snap at people and irritable -   would like to speak to nurse  Preferred Pharmacy? [address] phone number (if available)? [phone]  Additional Information for Provider?   ---------------------------------------------------------------------------  --------------  [address]  What is the best way for the office to contact you? OK to leave message on   voicemail  Preferred Call Back Phone Number? [phone]  ---------------------------------------------------------------------------  --------------  SCRIPT ANSWERS  Relationship to Patient?  Self

## 2022-05-20 NOTE — PROGRESS NOTES
Pain level is a 8/10 back     Pt is here for   Chief Complaint   Patient presents with   [address] Patient     here to establish care    [facility] Follow Up     5/9/2022 [facility]     Back Pain    Request For New Medication     for Gabapentin      1. Have you been to the ER, urgent care clinic since your last visit? Hospitalized since your last visit? Yes When: 5/9/2022 for [facility] for PE    2. Have you seen or consulted any other health care providers outside of the [address] [place] [address] since your last visit? Include any pap smears or colon screening.  No

## 2022-06-08 NOTE — TELEPHONE ENCOUNTER
----- Message from Leighton Montoya sent at 6/8/2022  8:14 AM EDT -----  Subject: Refill Request    QUESTIONS  Name of Medication? rivaroxaban (XARELTO STARTER SKY) 15 mg (42)- 20 mg   (9) DsPk  Patient-reported dosage and instructions? one a day  How many days do you have left? 5  Preferred Pharmacy? [address],[facility]  Pharmacy phone number (if available)? [phone]  ---------------------------------------------------------------------------  --------------  CALL BACK INFO  What is the best way for the office to contact you? OK to leave message on   voicemail  Preferred Call Back Phone Number? [phone]  ---------------------------------------------------------------------------  --------------  SCRIPT ANSWERS  Relationship to Patient?  Self

## 2022-06-15 DIAGNOSIS — M54.16 LUMBAR RADICULOPATHY: ICD-10-CM

## 2022-06-15 RX ORDER — GABAPENTIN 300 MG/1
CAPSULE ORAL
Qty: 60 CAPSULE | Refills: 0 | Status: SHIPPED | OUTPATIENT
Start: 2022-06-15 | End: 2022-07-13

## 2022-06-15 NOTE — TELEPHONE ENCOUNTER
For Francisco London in place:    Recommendation Provided To:    Intervention Detail: New Rx: 1, reason: Patient Preference   Gap Closed?:    Intervention Accepted By:   Natalia Time Spent (min): 5

## 2022-07-05 ENCOUNTER — TELEPHONE (OUTPATIENT)
Dept: ONCOLOGY | Age: 37
End: 2022-07-05

## 2022-07-13 DIAGNOSIS — M54.16 LUMBAR RADICULOPATHY: ICD-10-CM

## 2022-07-13 RX ORDER — GABAPENTIN 300 MG/1
CAPSULE ORAL
Qty: 60 CAPSULE | Refills: 0 | OUTPATIENT
Start: 2022-07-13

## 2022-07-21 ENCOUNTER — OFFICE VISIT (OUTPATIENT)
Dept: INTERNAL MEDICINE CLINIC | Age: 37
End: 2022-07-21
Payer: COMMERCIAL

## 2022-07-21 ENCOUNTER — HOSPITAL ENCOUNTER (OUTPATIENT)
Dept: LAB | Age: 37
Discharge: HOME | End: 2022-07-21
Payer: COMMERCIAL

## 2022-07-21 VITALS
HEART RATE: 76 BPM | HEIGHT: 62 IN | TEMPERATURE: 97.8 F | OXYGEN SATURATION: 97 % | WEIGHT: 275 LBS | BODY MASS INDEX: 50.61 KG/M2 | RESPIRATION RATE: 20 BRPM | DIASTOLIC BLOOD PRESSURE: 65 MMHG | SYSTOLIC BLOOD PRESSURE: 118 MMHG

## 2022-07-21 DIAGNOSIS — R29.818 SUSPECTED SLEEP APNEA: ICD-10-CM

## 2022-07-21 DIAGNOSIS — H61.23 BILATERAL IMPACTED CERUMEN: ICD-10-CM

## 2022-07-21 DIAGNOSIS — I26.99 OTHER ACUTE PULMONARY EMBOLISM WITHOUT ACUTE COR PULMONALE (HCC): ICD-10-CM

## 2022-07-21 DIAGNOSIS — Z01.419 WELL WOMAN EXAM WITH ROUTINE GYNECOLOGICAL EXAM: Primary | ICD-10-CM

## 2022-07-21 PROCEDURE — 69209 REMOVE IMPACTED EAR WAX UNI: CPT | Performed by: NURSE PRACTITIONER

## 2022-07-21 PROCEDURE — 88175 CYTOPATH C/V AUTO FLUID REDO: CPT

## 2022-07-21 PROCEDURE — 87624 HPV HI-RISK TYP POOLED RSLT: CPT

## 2022-07-21 PROCEDURE — 99395 PREV VISIT EST AGE 18-39: CPT | Performed by: NURSE PRACTITIONER

## 2022-07-21 NOTE — PROGRESS NOTES
Subjective:   43 y.o. female for Well Woman Check. No LMP recorded. Social History: single partner, contraception - none. Pertinent past medical hstory: no history of HTN, DVT, CAD, DM, liver disease, migraines or smoking. Patient Active Problem List   Diagnosis Code    Acute pulmonary embolism ([facility] [address] [address] I26.99        ROS:  Feeling well. No dyspnea or chest pain on exertion. No abdominal pain, change in bowel habits, black or bloody stools. No urinary tract symptoms. GYN ROS: normal menses, no abnormal bleeding, pelvic pain or discharge, no breast pain or new or enlarging lumps on self exam. No neurological complaints. Lumbar radiculopathy; gabapentin    Pulmonary emboli to multiple lobes  Unprovoked  On xarelto  Has heme fu on 7/27    Depression; follows w/ psychiatry, also on suboxone  Clean x approx 1 year! Possible sleep apnea: +snoring, poor sleep,  says he is worried watching her sleep    Objective:   Visit Vitals  /65   Pulse 76   Temp 97.8 °F (36.6 °C) (Temporal)   Resp 20   Ht 5' 2\" (1.575 m)   Wt 275 lb (124.7 kg)   SpO2 97%   BMI 50.30 kg/m²     Gen: Oriented to person, place and time and well-developed, well-nourished and in no distress. HEENT:    Head: normocephalic and atraumatic.  +cerumen impaction  Eyes:  EOM are normal. Pupils equal and round. Neck:  Normal range of motion. Neck supple. Cardiovascular: normal rate, regular rhythm and normal heart sounds. Pulmonary/Chest:  Effort normal and breath sounds normal.  No respiratory distress. No wheezes, no rales. Abdominal: soft, normal  bowel sounds. Musculoskeletal:  No edema, no tenderness. No calf tenderness or edema. Neurological:  Alert, oriented to person, place and time. Skin: skin is warm and dry. PELVIC EXAM: normal external genitalia, vulva, vagina, cervix, uterus and adnexa    Assessment/Plan:   well woman  pap smear    ICD-10-CM ICD-9-CM    1.  Well woman exam with routine gynecological exam  Z01.419 V72.31 PAP IG, APTIMA HPV AND RFX 16/18,45 ([id number])      HIV 1/2 AG/AB, 4TH GENERATION,W RFLX CONFIRM      HEPATITIS C QT BY PCR WITH REFLEX GENOTYPE      T PALLIDUM SCREEN W/REFLEX      2. Bilateral impacted cerumen  H61.23 380.4 REMOVAL IMPACTED CERUMEN IRRIGATION/LVG UNILAT      NUSWAB VAGINITIS PLUS      3. Suspected sleep apnea  R29.818 781.99 SLEEP MEDICINE REFERRAL      4. Other acute pulmonary embolism without acute cor pulmonale (HCC)  I26.99 415.19       .

## 2022-07-21 NOTE — PROGRESS NOTES
Nemo Vazquez is a 43 y.o. female  HIPAA verified by two patient identifiers. Health Maintenance Due   Topic    Hepatitis C Screening     Depression Screen     COVID-19 Vaccine (1)    DTaP/Tdap/Td series (1 - Tdap)    Cervical cancer screen      Chief Complaint   Patient presents with    Physical     Visit Vitals  /65   Pulse 76   Temp 97.8 °F (36.6 °C) (Temporal)   Resp 20   Ht 5' 2\" (1.575 m)   Wt 275 lb (124.7 kg)   SpO2 97%   BMI 50.30 kg/m²       Pain Scale: 5/10  Pain Location: Back (lower)  1. Have you been to the ER, urgent care clinic since your last visit? Hospitalized since your last visit? No    2. Have you seen or consulted any other health care providers outside of the [address] [place] [address] since your last visit? Include any pap smears or colon screening.  No

## 2022-07-24 LAB
A VAGINAE DNA VAG QL NAA+PROBE: ABNORMAL SCORE
BVAB2 DNA VAG QL NAA+PROBE: ABNORMAL SCORE
C ALBICANS DNA VAG QL NAA+PROBE: NEGATIVE
C GLABRATA DNA VAG QL NAA+PROBE: NEGATIVE
C TRACH RRNA SPEC QL NAA+PROBE: NEGATIVE
MEGA1 DNA VAG QL NAA+PROBE: ABNORMAL SCORE
N GONORRHOEA RRNA SPEC QL NAA+PROBE: NEGATIVE
SPECIMEN SOURCE: ABNORMAL
T VAGINALIS RRNA SPEC QL NAA+PROBE: NEGATIVE

## 2022-07-25 DIAGNOSIS — N76.0 BV (BACTERIAL VAGINOSIS): Primary | ICD-10-CM

## 2022-07-25 DIAGNOSIS — B96.89 BV (BACTERIAL VAGINOSIS): Primary | ICD-10-CM

## 2022-07-25 RX ORDER — METRONIDAZOLE 7.5 MG/G
1 GEL VAGINAL
Qty: 25 G | Refills: 0 | Status: SHIPPED | OUTPATIENT
Start: 2022-07-25 | End: 2022-07-30

## 2022-07-25 NOTE — PROGRESS NOTES
You have BACTERAL VAGINOSIS (BV), I have sent medicine to the pharmacy to treat this called FLAGYL. This occurs when you have an imbalance in the pH balance of your vagina. Avoid douching, baths, and perfumed lotions/soaps. Also your period and sex can affect this balance. Eating some yogurt with probiotics or taking them in pill form may help reduce the recurrence if you get this ALL the time.

## 2022-08-01 ENCOUNTER — TELEPHONE (OUTPATIENT)
Dept: ONCOLOGY | Age: 37
End: 2022-08-01

## 2022-08-01 NOTE — TELEPHONE ENCOUNTER
This patient is a new Hema patient, she has had several r/d with no shows. How would you like to proceed?

## 2022-08-09 RX ORDER — RIVAROXABAN 20 MG/1
TABLET, FILM COATED ORAL
Qty: 30 TABLET | Refills: 0 | Status: SHIPPED | OUTPATIENT
Start: 2022-08-09 | End: 2022-08-15 | Stop reason: SDUPTHER

## 2022-08-15 ENCOUNTER — OFFICE VISIT (OUTPATIENT)
Dept: ONCOLOGY | Age: 37
End: 2022-08-15
Payer: COMMERCIAL

## 2022-08-15 ENCOUNTER — DOCUMENTATION ONLY (OUTPATIENT)
Dept: ONCOLOGY | Age: 37
End: 2022-08-15

## 2022-08-15 VITALS
SYSTOLIC BLOOD PRESSURE: 114 MMHG | DIASTOLIC BLOOD PRESSURE: 71 MMHG | HEIGHT: 62 IN | TEMPERATURE: 99.3 F | HEART RATE: 87 BPM | BODY MASS INDEX: 48.91 KG/M2 | WEIGHT: 265.8 LBS | RESPIRATION RATE: 17 BRPM | OXYGEN SATURATION: 94 %

## 2022-08-15 DIAGNOSIS — D64.9 NORMOCYTIC ANEMIA: Primary | ICD-10-CM

## 2022-08-15 DIAGNOSIS — I26.99 PULMONARY EMBOLISM, OTHER, UNSPECIFIED CHRONICITY, UNSPECIFIED WHETHER ACUTE COR PULMONALE PRESENT (HCC): ICD-10-CM

## 2022-08-15 PROCEDURE — 99205 OFFICE O/P NEW HI 60 MIN: CPT | Performed by: STUDENT IN AN ORGANIZED HEALTH CARE EDUCATION/TRAINING PROGRAM

## 2022-08-15 NOTE — PATIENT INSTRUCTIONS
You were asking about a device called a \"Pulse Oximeter\" or Home Oxygen Saturation monitor.   These are available at [facility] or at your pharmacy

## 2022-08-15 NOTE — PROGRESS NOTES
Patient had a 2nd no show for tx and Dr. Main Deutsch. Dr. Felix Coulter is going to call him and see where he stands with his wanting treatment.

## 2022-08-15 NOTE — PROGRESS NOTES
Leonor Mendoza is a 42 y.o. female  New pt here today for pulmonary embolism. Pt reports pain today 5/10 in chest and both calf muscles. Pt reports being in hospital in July for sob. Pt reports fatigue and sob when walking up steps.         Chief Complaint   Patient presents with    New Patient     Pulmonary embolism

## 2022-08-15 NOTE — PROGRESS NOTES
[facility] at [address] [place] [address] [address] [facility]  W: [phone] F: [phone]      Reason for Visit:   Vickie Woods is a 42 y.o. female who is seen in consultation at the request of Andrew Woodard NP for evaluation of extensive unprovoked bilateral PE. Hematology / Oncology Treatment History:     Hematological/Oncological Diagnosis: Bilateral unprovoked PE    Date of Diagnosis: 5/9/22    Treatment course: Xarelto      History of Present Illness:     42year old with medical history of anxiety, substance abuse, presented to ED on 5/9/22 with shortness of breath. Workup showed RV strain with bilateral extensive pulmonary emboli. Lower extremity doppler showed a questionable thrombus in the left popliteal vein. No clear provoking causes. Family history reviewed, no family history of blood clotting disorders. She has been taking xarelto without complication since May. She has mild complaints of chest discomfort, leg swelling bilaterally. Review of Systems: A complete review of systems was obtained, negative except as described above. Past Medical History:   Diagnosis Date    Psychiatric disorder     depression    Psychiatric disorder     anxiety      Past Surgical History:   Procedure Laterality Date    HX ORTHOPAEDIC  2012    left shoulder      Social History     Tobacco Use    Smoking status: Former    Smokeless tobacco: Never   Substance Use Topics    Alcohol use: Not Currently      No family history on file. Current Outpatient Medications   Medication Sig    Xarelto 20 mg tab tablet Take 1 tablet by mouth once daily    gabapentin (NEURONTIN) 300 mg capsule TAKE 1 CAPSULE BY MOUTH TWICE DAILY . DO NOT EXCEED 600MG    sertraline (ZOLOFT) 100 mg tablet Take 200 mg by mouth daily. clonazePAM (KlonoPIN) 1 mg tablet Take 1 mg by mouth two (2) times a day.     buprenorphine-naloxone 8-2 mg subl 3 Tablets by SubLINGual route daily. No current facility-administered medications for this visit. No Known Allergies         Physical Exam:   Visit Vitals  /71 (BP 1 Location: Left upper arm, BP Patient Position: Sitting)   Pulse 87   Temp 99.3 °F (37.4 °C) (Oral)   Resp 17   Ht 5' 2\" (1.575 m)   Wt 265 lb 12.8 oz (120.6 kg)   LMP 07/23/2022   SpO2 94%   BMI 48.62 kg/m²     ECOG PS: 0  General: No distress  Eyes: PERRLA, anicteric sclerae  HENT: Atraumatic with normal appearance of ears and nose; OP clear  Neck: Supple; no visualized JVD  Lymphatic: No cervical, or supraclavicular lymphadenopathy. Respiratory: CTAB, normal respiratory effort  CV: Normal rate, regular rhythm, no murmurs, no peripheral edema  GI: Soft, nontender, nondistended, no palpable masses, no hepatomegaly, no splenomegaly  MS: Normal gait and station. Digits without clubbing or cyanosis. Skin: No rashes, ecchymoses, or petechiae. Normal temperature, turgor, and texture. Neuro/Psych: Alert, oriented, appropriate affect, normal judgment/insight      Results:     Lab Results   Component Value Date/Time    WBC 7.0 05/11/2022 03:45 AM    HGB 10.7 (L) 05/11/2022 03:45 AM    HCT 35.1 05/11/2022 03:45 AM    PLATELET 477 37/47/4968 03:45 AM    MCV 85.6 05/11/2022 03:45 AM    ABS. NEUTROPHILS 4.9 05/11/2022 03:45 AM     Lab Results   Component Value Date/Time    Sodium 142 05/11/2022 03:45 AM    Potassium 3.7 05/11/2022 03:45 AM    Chloride 105 05/11/2022 03:45 AM    CO2 27 05/11/2022 03:45 AM    Glucose 106 (H) 05/11/2022 03:45 AM    BUN 8 05/11/2022 03:45 AM    Creatinine 0.79 05/11/2022 03:45 AM    GFR est AA >60 05/11/2022 03:45 AM    GFR est non-AA >60 05/11/2022 03:45 AM    Calcium 8.7 05/11/2022 03:45 AM     Lab Results   Component Value Date/Time    Bilirubin, total 0.3 05/10/2022 03:45 AM    ALT (SGPT) 16 05/10/2022 03:45 AM    Alk.  phosphatase 61 05/10/2022 03:45 AM    Protein, total 6.8 05/10/2022 03:45 AM    Albumin 3.0 (L) 05/10/2022 03:45 AM    Globulin 3.8 05/10/2022 03:45 AM     CT Results (most recent):  Results from [place] encounter on 05/09/22    CTA CHEST W OR W WO CONT    Narrative  EXAM:  CTA CHEST W OR W WO CONT  INDICATION:  new onset SOB, elevated troponin, very elevated D-dimer, likely PE. Additional history:  COMPARISON: None. .  TECHNIQUE:  Precontrast  images were obtained to localize the volume for acquisition. Multislice helical CT arteriography was performed from the diaphragm to the  thoracic inlet during uneventful rapid bolus intravenous contrast  administration. Lung and soft tissue windows were generated. Coronal and  sagittal images were generated and 3D post processing consisting of coronal  maximum intensity images was performed. CT dose reduction was achieved through use of a standardized protocol tailored  for this examination and automatic exposure control for dose modulation. Fina Valenzuela FINDINGS:  CHEST:  Chest wall/thoracic inlet: Within normal limits. Thyroid: Within normal limits. Mediastinum/lara: Within normal limits. Heart/vessels: There are pulmonary emboli to all lobes. Lungs/Pleura: Opacity in the posterior costophrenic sulcus. .  INCIDENTALLY IMAGED ABDOMEN:  Small hiatal hernia  . MSK:  Within normal limits. .    Impression  1. There are pulmonary emboli to all lobes. 2. Opacity in the posterior costophrenic sulcus is nonspecific and may represent  infarct, atelectasis and/or airspace disease. .  Findings of this exam were discussed with Solange Nam by Dr. Sanaz Chavez by  telephone at approximately, 5/9/2022 9:47 PM.  292      Assessment and Recommendations:     # Unprovoked extensive pulmonary embolism    Single episode  Unprovoked event 5/2022  No clear increased familial historical risk   On Rivaroxaban.   Plan to complete 6 months of systemic anticoagulation     The decision about the duration of anticoagulation is not dependent on thrombophilia testing results but on the provoked/unprovoked nature of the VTE. The risk of second event in the next 10 years is around 40%. The greatest risk is in the first 6 months (around 10-15%). In the subsequent years, the risk is about 2-3% every year. It is difficult to subject a young woman to life long anticoagulation. ACCP guidelines for management of patients with VTE allows individualization of therapy choices. I had an extended risk benefit discussions with the patient. She vocalized understanding. Rivaroxaban, an oral factor Xa inhibitor, provides a simple, fixed-dose regimen for treating acute deep-vein thrombosis (DVT) and for continued treatment, without the need for laboratory monitoring. An open-label, randomized, event-driven, noninferiority study that compared oral rivaroxaban alone (15 mg twice daily for 3 weeks, followed by 20 mg once daily) with subcutaneous enoxaparin followed by a vitamin K antagonist (either warfarin or acenocoumarol) for 3, 6, or 12 months in patients with acute, symptomatic DVT. Rivaroxaban was found to be non-inferior to the comparator (lovenox followed by warfarin) with regards to the primary efficacy i.e incidence of recurrent DVT/PE. Episodes of major bleeding was also similar in both groups. ([facility] investigators, [facility] 9527;[id number])     After completion of 6 months of Xarelto, the patient may have some benefit from aspirin 81 mg daily. Based on the data below from the INSPIRE study, the patient would benefit from low dose aspirin daily to reduce the risk of recurrent VTE. Supporting data detailed below:     \"Aspirin for the prevention of recurrent venous thromboembolism: the INSPIRE collaboration\" Robyn Miller et al.   Aspirin after anticoagulant treatment reduces the overall risk of recurrence by more than a third in a broad cross-section of patients with a first unprovoked VTE, without significantly increasing the risk of bleeding.    Of 1224 patients, 193 had recurrent VTE over 30.4 months' median follow-up. Aspirin reduced recurrent VTE (7.5%/yr versus 5.1%/yr; hazard ratio [HR], 0.68; 95% confidence interval [CI], 0.51-0.90; P=0.008), including both deep-vein thrombosis (HR, 0.66; 95% CI, 0.47-0.92; P=0.01) and pulmonary embolism (HR, 0.66; 95% CI, 0.41-1.06; P=0.08). Aspirin reduced major vascular events (8.7%/yr versus 5.7%/yr; HR, 0.66; 95% CI, 0.50-0.86; P=0.002). The major bleeding rate was low (0.4%/yr for placebo and 0.5%/yr for aspirin). After adjustment for treatment adherence, recurrent VTE was reduced by 42% (HR, 0.58; 95% CI, 0.40-0.85; P=0.005). Plan for follow up in 6 months to evaluate toleration and symptom check. If doing well, she may be able to follow on a PRN basis, with yearly well patient visits with her PCP. - the patient has nonspecific complaints of chest discomfort and leg pain that has been chronic.   Will recheck D-dimer      # Normocytic anemia  -   Lab Results   Component Value Date/Time    WBC 7.0 05/11/2022 03:45 AM    HGB 10.7 (L) 05/11/2022 03:45 AM    HCT 35.1 05/11/2022 03:45 AM    PLATELET 070 47/27/4457 03:45 AM    MCV 85.6 05/11/2022 03:45 AM     - will check iron profile and CBC today    Signed By: Jose Robles MD      Attending Medical Oncologist   [facility]

## 2022-08-16 DIAGNOSIS — M54.16 LUMBAR RADICULOPATHY: ICD-10-CM

## 2022-08-16 LAB
ALBUMIN SERPL-MCNC: 4.4 G/DL (ref 3.8–4.8)
ALBUMIN/GLOB SERPL: 1.7 {RATIO} (ref 1.2–2.2)
ALP SERPL-CCNC: 72 IU/L (ref 44–121)
ALT SERPL-CCNC: 11 IU/L (ref 0–32)
AST SERPL-CCNC: 16 IU/L (ref 0–40)
BASOPHILS # BLD AUTO: 0.1 X10E3/UL (ref 0–0.2)
BASOPHILS NFR BLD AUTO: 1 %
BILIRUB SERPL-MCNC: <0.2 MG/DL (ref 0–1.2)
BUN SERPL-MCNC: 7 MG/DL (ref 6–20)
BUN/CREAT SERPL: 8 (ref 9–23)
CALCIUM SERPL-MCNC: 8.5 MG/DL (ref 8.7–10.2)
CHLORIDE SERPL-SCNC: 102 MMOL/L (ref 96–106)
CO2 SERPL-SCNC: 24 MMOL/L (ref 20–29)
CREAT SERPL-MCNC: 0.87 MG/DL (ref 0.57–1)
D DIMER PPP FEU-MCNC: 0.44 MG/L FEU (ref 0–0.49)
EGFR: 87 ML/MIN/1.73
EOSINOPHIL # BLD AUTO: 0.4 X10E3/UL (ref 0–0.4)
EOSINOPHIL NFR BLD AUTO: 5 %
ERYTHROCYTE [DISTWIDTH] IN BLOOD BY AUTOMATED COUNT: 16.3 % (ref 11.7–15.4)
GLOBULIN SER CALC-MCNC: 2.6 G/DL (ref 1.5–4.5)
GLUCOSE SERPL-MCNC: 86 MG/DL (ref 65–99)
HCT VFR BLD AUTO: 34.6 % (ref 34–46.6)
HGB BLD-MCNC: 10.7 G/DL (ref 11.1–15.9)
IMM GRANULOCYTES # BLD AUTO: 0 X10E3/UL (ref 0–0.1)
IMM GRANULOCYTES NFR BLD AUTO: 0 %
IRON SATN MFR SERPL: 9 % (ref 15–55)
IRON SERPL-MCNC: 35 UG/DL (ref 27–159)
LYMPHOCYTES # BLD AUTO: 2.4 X10E3/UL (ref 0.7–3.1)
LYMPHOCYTES NFR BLD AUTO: 32 %
MCH RBC QN AUTO: 26.3 PG (ref 26.6–33)
MCHC RBC AUTO-ENTMCNC: 30.9 G/DL (ref 31.5–35.7)
MCV RBC AUTO: 85 FL (ref 79–97)
MONOCYTES # BLD AUTO: 0.6 X10E3/UL (ref 0.1–0.9)
MONOCYTES NFR BLD AUTO: 9 %
NEUTROPHILS # BLD AUTO: 4 X10E3/UL (ref 1.4–7)
NEUTROPHILS NFR BLD AUTO: 53 %
PLATELET # BLD AUTO: 286 X10E3/UL (ref 150–450)
POTASSIUM SERPL-SCNC: 4.3 MMOL/L (ref 3.5–5.2)
PROT SERPL-MCNC: 7 G/DL (ref 6–8.5)
RBC # BLD AUTO: 4.07 X10E6/UL (ref 3.77–5.28)
SODIUM SERPL-SCNC: 140 MMOL/L (ref 134–144)
TIBC SERPL-MCNC: 400 UG/DL (ref 250–450)
UIBC SERPL-MCNC: 365 UG/DL (ref 131–425)
WBC # BLD AUTO: 7.4 X10E3/UL (ref 3.4–10.8)

## 2022-08-16 RX ORDER — GABAPENTIN 300 MG/1
CAPSULE ORAL
Qty: 90 CAPSULE | Refills: 2 | Status: SHIPPED | OUTPATIENT
Start: 2022-08-16

## 2022-08-17 DIAGNOSIS — I26.99 OTHER ACUTE PULMONARY EMBOLISM WITHOUT ACUTE COR PULMONALE (HCC): Primary | ICD-10-CM

## 2022-08-22 PROBLEM — D50.9 IRON DEFICIENCY ANEMIA: Status: ACTIVE | Noted: 2022-08-22

## 2022-08-22 RX ORDER — DIPHENHYDRAMINE HYDROCHLORIDE 50 MG/ML
50 INJECTION, SOLUTION INTRAMUSCULAR; INTRAVENOUS ONCE
Start: 2022-09-13 | End: 2022-09-13

## 2022-08-22 RX ORDER — HEPARIN 100 UNIT/ML
500 SYRINGE INTRAVENOUS AS NEEDED
Status: CANCELLED
Start: 2022-10-17

## 2022-08-22 RX ORDER — HEPARIN 100 UNIT/ML
500 SYRINGE INTRAVENOUS AS NEEDED
Status: CANCELLED
Start: 2022-10-31

## 2022-08-22 RX ORDER — HYDROCORTISONE SODIUM SUCCINATE 100 MG/2ML
100 INJECTION, POWDER, FOR SOLUTION INTRAMUSCULAR; INTRAVENOUS AS NEEDED
Status: CANCELLED | OUTPATIENT
Start: 2022-10-31

## 2022-08-22 RX ORDER — HYDROCORTISONE SODIUM SUCCINATE 100 MG/2ML
100 INJECTION, POWDER, FOR SOLUTION INTRAMUSCULAR; INTRAVENOUS AS NEEDED
Status: CANCELLED | OUTPATIENT
Start: 2022-10-17

## 2022-08-22 RX ORDER — EPINEPHRINE 1 MG/ML
0.3 INJECTION, SOLUTION, CONCENTRATE INTRAVENOUS AS NEEDED
Status: CANCELLED | OUTPATIENT
Start: 2022-10-31

## 2022-08-22 RX ORDER — SODIUM CHLORIDE 0.9 % (FLUSH) 0.9 %
5-40 SYRINGE (ML) INJECTION AS NEEDED
Status: CANCELLED | OUTPATIENT
Start: 2022-10-10

## 2022-08-22 RX ORDER — SODIUM CHLORIDE 9 MG/ML
5-250 INJECTION, SOLUTION INTRAVENOUS AS NEEDED
Status: CANCELLED | OUTPATIENT
Start: 2022-09-19

## 2022-08-22 RX ORDER — ALBUTEROL SULFATE 0.83 MG/ML
2.5 SOLUTION RESPIRATORY (INHALATION) AS NEEDED
Status: CANCELLED
Start: 2022-09-19

## 2022-08-22 RX ORDER — DIPHENHYDRAMINE HYDROCHLORIDE 50 MG/ML
50 INJECTION, SOLUTION INTRAMUSCULAR; INTRAVENOUS ONCE
Start: 2022-10-11 | End: 2022-10-11

## 2022-08-22 RX ORDER — ALBUTEROL SULFATE 0.83 MG/ML
2.5 SOLUTION RESPIRATORY (INHALATION) AS NEEDED
Status: CANCELLED
Start: 2022-10-10

## 2022-08-22 RX ORDER — ONDANSETRON 2 MG/ML
8 INJECTION INTRAMUSCULAR; INTRAVENOUS AS NEEDED
Status: CANCELLED | OUTPATIENT
Start: 2022-10-31

## 2022-08-22 RX ORDER — HEPARIN 100 UNIT/ML
500 SYRINGE INTRAVENOUS AS NEEDED
Status: CANCELLED
Start: 2022-09-12

## 2022-08-22 RX ORDER — SODIUM CHLORIDE 9 MG/ML
5-40 INJECTION INTRAMUSCULAR; INTRAVENOUS; SUBCUTANEOUS AS NEEDED
Status: CANCELLED | OUTPATIENT
Start: 2022-10-31

## 2022-08-22 RX ORDER — DIPHENHYDRAMINE HYDROCHLORIDE 50 MG/ML
25 INJECTION, SOLUTION INTRAMUSCULAR; INTRAVENOUS AS NEEDED
Status: CANCELLED
Start: 2022-10-17

## 2022-08-22 RX ORDER — DIPHENHYDRAMINE HYDROCHLORIDE 50 MG/ML
50 INJECTION, SOLUTION INTRAMUSCULAR; INTRAVENOUS ONCE
Start: 2022-10-04 | End: 2022-10-04

## 2022-08-22 RX ORDER — ONDANSETRON 2 MG/ML
8 INJECTION INTRAMUSCULAR; INTRAVENOUS AS NEEDED
Status: CANCELLED | OUTPATIENT
Start: 2022-09-12

## 2022-08-22 RX ORDER — DIPHENHYDRAMINE HYDROCHLORIDE 50 MG/ML
50 INJECTION, SOLUTION INTRAMUSCULAR; INTRAVENOUS AS NEEDED
Status: CANCELLED
Start: 2022-09-12

## 2022-08-22 RX ORDER — HYDROCORTISONE SODIUM SUCCINATE 100 MG/2ML
100 INJECTION, POWDER, FOR SOLUTION INTRAMUSCULAR; INTRAVENOUS AS NEEDED
Status: CANCELLED | OUTPATIENT
Start: 2022-09-12

## 2022-08-22 RX ORDER — ALBUTEROL SULFATE 0.83 MG/ML
2.5 SOLUTION RESPIRATORY (INHALATION) AS NEEDED
Status: CANCELLED
Start: 2022-10-31

## 2022-08-22 RX ORDER — SODIUM CHLORIDE 0.9 % (FLUSH) 0.9 %
5-40 SYRINGE (ML) INJECTION AS NEEDED
Status: CANCELLED | OUTPATIENT
Start: 2022-10-31

## 2022-08-22 RX ORDER — DIPHENHYDRAMINE HYDROCHLORIDE 50 MG/ML
50 INJECTION, SOLUTION INTRAMUSCULAR; INTRAVENOUS ONCE
Start: 2022-09-27 | End: 2022-09-27

## 2022-08-22 RX ORDER — ACETAMINOPHEN 325 MG/1
650 TABLET ORAL AS NEEDED
Status: CANCELLED
Start: 2022-10-31

## 2022-08-22 RX ORDER — ACETAMINOPHEN 325 MG/1
650 TABLET ORAL ONCE
Start: 2022-09-13 | End: 2022-09-13

## 2022-08-22 RX ORDER — ONDANSETRON 2 MG/ML
8 INJECTION INTRAMUSCULAR; INTRAVENOUS AS NEEDED
Status: CANCELLED | OUTPATIENT
Start: 2022-10-10

## 2022-08-22 RX ORDER — SODIUM CHLORIDE 9 MG/ML
5-250 INJECTION, SOLUTION INTRAVENOUS AS NEEDED
Status: CANCELLED | OUTPATIENT
Start: 2022-09-12

## 2022-08-22 RX ORDER — SODIUM CHLORIDE 9 MG/ML
5-250 INJECTION, SOLUTION INTRAVENOUS AS NEEDED
Status: CANCELLED | OUTPATIENT
Start: 2022-10-17

## 2022-08-22 RX ORDER — ONDANSETRON 2 MG/ML
8 INJECTION INTRAMUSCULAR; INTRAVENOUS AS NEEDED
Status: CANCELLED | OUTPATIENT
Start: 2022-10-17

## 2022-08-22 RX ORDER — DIPHENHYDRAMINE HYDROCHLORIDE 50 MG/ML
50 INJECTION, SOLUTION INTRAMUSCULAR; INTRAVENOUS AS NEEDED
Status: CANCELLED
Start: 2022-09-19

## 2022-08-22 RX ORDER — SODIUM CHLORIDE 0.9 % (FLUSH) 0.9 %
5-40 SYRINGE (ML) INJECTION AS NEEDED
Status: CANCELLED | OUTPATIENT
Start: 2022-09-12

## 2022-08-22 RX ORDER — EPINEPHRINE 1 MG/ML
0.3 INJECTION, SOLUTION, CONCENTRATE INTRAVENOUS AS NEEDED
Status: CANCELLED | OUTPATIENT
Start: 2022-09-19

## 2022-08-22 RX ORDER — ACETAMINOPHEN 325 MG/1
650 TABLET ORAL ONCE
Start: 2022-09-27 | End: 2022-09-27

## 2022-08-22 RX ORDER — SODIUM CHLORIDE 9 MG/ML
5-250 INJECTION, SOLUTION INTRAVENOUS AS NEEDED
Status: CANCELLED | OUTPATIENT
Start: 2022-10-31

## 2022-08-22 RX ORDER — EPINEPHRINE 1 MG/ML
0.3 INJECTION, SOLUTION, CONCENTRATE INTRAVENOUS AS NEEDED
Status: CANCELLED | OUTPATIENT
Start: 2022-10-17

## 2022-08-22 RX ORDER — DIPHENHYDRAMINE HYDROCHLORIDE 50 MG/ML
50 INJECTION, SOLUTION INTRAMUSCULAR; INTRAVENOUS AS NEEDED
Status: CANCELLED
Start: 2022-10-10

## 2022-08-22 RX ORDER — DIPHENHYDRAMINE HYDROCHLORIDE 50 MG/ML
50 INJECTION, SOLUTION INTRAMUSCULAR; INTRAVENOUS AS NEEDED
Status: CANCELLED
Start: 2022-10-31

## 2022-08-22 RX ORDER — EPINEPHRINE 1 MG/ML
0.3 INJECTION, SOLUTION, CONCENTRATE INTRAVENOUS AS NEEDED
Status: CANCELLED | OUTPATIENT
Start: 2022-10-10

## 2022-08-22 RX ORDER — ALBUTEROL SULFATE 0.83 MG/ML
2.5 SOLUTION RESPIRATORY (INHALATION) AS NEEDED
Status: CANCELLED
Start: 2022-10-17

## 2022-08-22 RX ORDER — ACETAMINOPHEN 325 MG/1
650 TABLET ORAL ONCE
Start: 2022-10-11 | End: 2022-10-11

## 2022-08-22 RX ORDER — DIPHENHYDRAMINE HYDROCHLORIDE 50 MG/ML
25 INJECTION, SOLUTION INTRAMUSCULAR; INTRAVENOUS AS NEEDED
Status: CANCELLED
Start: 2022-10-31

## 2022-08-22 RX ORDER — DIPHENHYDRAMINE HYDROCHLORIDE 50 MG/ML
25 INJECTION, SOLUTION INTRAMUSCULAR; INTRAVENOUS AS NEEDED
Status: CANCELLED
Start: 2022-10-10

## 2022-08-22 RX ORDER — SODIUM CHLORIDE 9 MG/ML
5-40 INJECTION INTRAMUSCULAR; INTRAVENOUS; SUBCUTANEOUS AS NEEDED
Status: CANCELLED | OUTPATIENT
Start: 2022-09-19

## 2022-08-22 RX ORDER — DIPHENHYDRAMINE HYDROCHLORIDE 50 MG/ML
25 INJECTION, SOLUTION INTRAMUSCULAR; INTRAVENOUS AS NEEDED
Status: CANCELLED
Start: 2022-09-19

## 2022-08-22 RX ORDER — SODIUM CHLORIDE 0.9 % (FLUSH) 0.9 %
5-40 SYRINGE (ML) INJECTION AS NEEDED
Status: CANCELLED | OUTPATIENT
Start: 2022-09-19

## 2022-08-22 RX ORDER — SODIUM CHLORIDE 9 MG/ML
5-250 INJECTION, SOLUTION INTRAVENOUS AS NEEDED
Status: CANCELLED | OUTPATIENT
Start: 2022-10-10

## 2022-08-22 RX ORDER — SODIUM CHLORIDE 9 MG/ML
5-40 INJECTION INTRAMUSCULAR; INTRAVENOUS; SUBCUTANEOUS AS NEEDED
Status: CANCELLED | OUTPATIENT
Start: 2022-10-17

## 2022-08-22 RX ORDER — EPINEPHRINE 1 MG/ML
0.3 INJECTION, SOLUTION, CONCENTRATE INTRAVENOUS AS NEEDED
Status: CANCELLED | OUTPATIENT
Start: 2022-09-12

## 2022-08-22 RX ORDER — ACETAMINOPHEN 325 MG/1
650 TABLET ORAL AS NEEDED
Status: CANCELLED
Start: 2022-09-12

## 2022-08-22 RX ORDER — HEPARIN 100 UNIT/ML
500 SYRINGE INTRAVENOUS AS NEEDED
Status: CANCELLED
Start: 2022-10-10

## 2022-08-22 RX ORDER — SODIUM CHLORIDE 9 MG/ML
5-40 INJECTION INTRAMUSCULAR; INTRAVENOUS; SUBCUTANEOUS AS NEEDED
Status: CANCELLED | OUTPATIENT
Start: 2022-09-12

## 2022-08-22 RX ORDER — HYDROCORTISONE SODIUM SUCCINATE 100 MG/2ML
100 INJECTION, POWDER, FOR SOLUTION INTRAMUSCULAR; INTRAVENOUS AS NEEDED
Status: CANCELLED | OUTPATIENT
Start: 2022-09-19

## 2022-08-22 RX ORDER — HEPARIN 100 UNIT/ML
500 SYRINGE INTRAVENOUS AS NEEDED
Status: CANCELLED
Start: 2022-09-19

## 2022-08-22 RX ORDER — ACETAMINOPHEN 325 MG/1
650 TABLET ORAL ONCE
Start: 2022-10-04 | End: 2022-10-04

## 2022-08-22 RX ORDER — ACETAMINOPHEN 325 MG/1
650 TABLET ORAL AS NEEDED
Status: CANCELLED
Start: 2022-10-17

## 2022-08-22 RX ORDER — ALBUTEROL SULFATE 0.83 MG/ML
2.5 SOLUTION RESPIRATORY (INHALATION) AS NEEDED
Status: CANCELLED
Start: 2022-09-12

## 2022-08-22 RX ORDER — ACETAMINOPHEN 325 MG/1
650 TABLET ORAL AS NEEDED
Status: CANCELLED
Start: 2022-10-10

## 2022-08-22 RX ORDER — DIPHENHYDRAMINE HYDROCHLORIDE 50 MG/ML
50 INJECTION, SOLUTION INTRAMUSCULAR; INTRAVENOUS AS NEEDED
Status: CANCELLED
Start: 2022-10-17

## 2022-08-22 RX ORDER — ACETAMINOPHEN 325 MG/1
650 TABLET ORAL ONCE
Start: 2022-09-20 | End: 2022-09-20

## 2022-08-22 RX ORDER — ONDANSETRON 2 MG/ML
8 INJECTION INTRAMUSCULAR; INTRAVENOUS AS NEEDED
Status: CANCELLED | OUTPATIENT
Start: 2022-09-19

## 2022-08-22 RX ORDER — ACETAMINOPHEN 325 MG/1
650 TABLET ORAL AS NEEDED
Status: CANCELLED
Start: 2022-09-19

## 2022-08-22 RX ORDER — HYDROCORTISONE SODIUM SUCCINATE 100 MG/2ML
100 INJECTION, POWDER, FOR SOLUTION INTRAMUSCULAR; INTRAVENOUS AS NEEDED
Status: CANCELLED | OUTPATIENT
Start: 2022-10-10

## 2022-08-22 RX ORDER — SODIUM CHLORIDE 0.9 % (FLUSH) 0.9 %
5-40 SYRINGE (ML) INJECTION AS NEEDED
Status: CANCELLED | OUTPATIENT
Start: 2022-10-17

## 2022-08-22 RX ORDER — SODIUM CHLORIDE 9 MG/ML
5-40 INJECTION INTRAMUSCULAR; INTRAVENOUS; SUBCUTANEOUS AS NEEDED
Status: CANCELLED | OUTPATIENT
Start: 2022-10-10

## 2022-08-22 RX ORDER — DIPHENHYDRAMINE HYDROCHLORIDE 50 MG/ML
50 INJECTION, SOLUTION INTRAMUSCULAR; INTRAVENOUS ONCE
Start: 2022-09-20 | End: 2022-09-20

## 2022-08-22 RX ORDER — DIPHENHYDRAMINE HYDROCHLORIDE 50 MG/ML
25 INJECTION, SOLUTION INTRAMUSCULAR; INTRAVENOUS AS NEEDED
Status: CANCELLED
Start: 2022-09-12

## 2022-08-29 DIAGNOSIS — M54.16 LUMBAR RADICULOPATHY: ICD-10-CM

## 2022-08-29 RX ORDER — METRONIDAZOLE 7.5 MG/G
1 GEL VAGINAL
Qty: 25 G | Refills: 0 | Status: SHIPPED | OUTPATIENT
Start: 2022-08-29 | End: 2022-09-03

## 2022-08-29 RX ORDER — GABAPENTIN 300 MG/1
CAPSULE ORAL
Qty: 90 CAPSULE | Refills: 2 | Status: CANCELLED | OUTPATIENT
Start: 2022-08-29

## 2022-08-29 NOTE — TELEPHONE ENCOUNTER
Duplicate request: Neurontin 300 mg #90 with 2 refills was sent to [address] on 08/16/2022. No access to [facility] 469 Only    Intervention Detail: Discontinued Rx: 1, reason: Duplicate Therapy  Time Spent (min): 5    Requested Prescriptions     Pending Prescriptions Disp Refills    gabapentin (NEURONTIN) 300 mg capsule 90 Capsule 2     Sig: TAKE 1 CAPSULE BY MOUTH TWICE DAILY .  DO NOT EXCEED 600MG

## 2022-08-30 ENCOUNTER — TELEPHONE (OUTPATIENT)
Dept: INTERNAL MEDICINE CLINIC | Age: 37
End: 2022-08-30

## 2022-08-30 NOTE — TELEPHONE ENCOUNTER
Mary Ellen with [facility] states  they need clarification on the Metrogel prescription.     [facility] # [phone]

## 2022-09-09 DIAGNOSIS — D50.9 IRON DEFICIENCY ANEMIA, UNSPECIFIED IRON DEFICIENCY ANEMIA TYPE: Primary | ICD-10-CM

## 2022-09-12 ENCOUNTER — HOSPITAL ENCOUNTER (OUTPATIENT)
Dept: INFUSION THERAPY | Age: 37
Discharge: HOME | End: 2022-09-12
Payer: COMMERCIAL

## 2022-09-12 VITALS
HEART RATE: 98 BPM | SYSTOLIC BLOOD PRESSURE: 97 MMHG | DIASTOLIC BLOOD PRESSURE: 71 MMHG | OXYGEN SATURATION: 98 % | RESPIRATION RATE: 18 BRPM | TEMPERATURE: 97 F

## 2022-09-12 DIAGNOSIS — D50.9 IRON DEFICIENCY ANEMIA, UNSPECIFIED IRON DEFICIENCY ANEMIA TYPE: Primary | ICD-10-CM

## 2022-09-12 PROCEDURE — 96372 THER/PROPH/DIAG INJ SC/IM: CPT

## 2022-09-12 PROCEDURE — 74011000250 HC RX REV CODE- 250: Performed by: STUDENT IN AN ORGANIZED HEALTH CARE EDUCATION/TRAINING PROGRAM

## 2022-09-12 PROCEDURE — 74011250636 HC RX REV CODE- 250/636: Performed by: STUDENT IN AN ORGANIZED HEALTH CARE EDUCATION/TRAINING PROGRAM

## 2022-09-12 PROCEDURE — 96374 THER/PROPH/DIAG INJ IV PUSH: CPT

## 2022-09-12 RX ORDER — DIPHENHYDRAMINE HYDROCHLORIDE 50 MG/ML
25 INJECTION, SOLUTION INTRAMUSCULAR; INTRAVENOUS AS NEEDED
Status: ACTIVE | OUTPATIENT
Start: 2022-09-12 | End: 2022-09-12

## 2022-09-12 RX ORDER — SODIUM CHLORIDE 0.9 % (FLUSH) 0.9 %
5-40 SYRINGE (ML) INJECTION AS NEEDED
Status: DISPENSED | OUTPATIENT
Start: 2022-09-12 | End: 2022-09-12

## 2022-09-12 RX ORDER — SODIUM CHLORIDE 9 MG/ML
5-250 INJECTION, SOLUTION INTRAVENOUS AS NEEDED
Status: DISPENSED | OUTPATIENT
Start: 2022-09-12 | End: 2022-09-12

## 2022-09-12 RX ORDER — ACETAMINOPHEN 325 MG/1
650 TABLET ORAL AS NEEDED
Status: ACTIVE | OUTPATIENT
Start: 2022-09-12 | End: 2022-09-12

## 2022-09-12 RX ORDER — ONDANSETRON 2 MG/ML
8 INJECTION INTRAMUSCULAR; INTRAVENOUS AS NEEDED
Status: ACTIVE | OUTPATIENT
Start: 2022-09-12 | End: 2022-09-12

## 2022-09-12 RX ADMIN — IRON SUCROSE 200 MG: 20 INJECTION, SOLUTION INTRAVENOUS at 12:19

## 2022-09-12 RX ADMIN — SODIUM CHLORIDE 50 ML/HR: 9 INJECTION, SOLUTION INTRAVENOUS at 11:05

## 2022-09-12 RX ADMIN — SODIUM CHLORIDE, PRESERVATIVE FREE 10 ML: 5 INJECTION INTRAVENOUS at 11:04

## 2022-09-12 NOTE — PROGRESS NOTES
Outpatient Infusion Center Progress Note    8795 Pt admit to [facility] for Venofer 1 of 5 ambulatory in stable condition. Assessment completed. 1st dose education provided; question/concerns addressed. PIV to left arm; NS started at [facility]. Visit Vitals  BP 97/71   Pulse 98   Temp 97 °F (36.1 °C)   Resp 18   SpO2 98%   Breastfeeding No       Medications:  Medications Administered       0.9% sodium chloride infusion       Admin Date  09/12/2022 Action  New Bag Dose  50 mL/hr Rate  50 mL/hr Route  IntraVENous Administered By  Olesya Krueger, DAVID              iron sucrose (VENOFER) injection 200 mg       Admin Date  09/12/2022 Action  Given Dose  200 mg Route  IntraVENous Administered By  Olesya Krueger, DAVID              sodium chloride (NS) flush 5-40 mL       Admin Date  09/12/2022 Action  Given Dose  10 mL Route  IntraVENous Administered By  Olesya Krueger, DAVID             Pt observed 30 min post IVP w/o incident. 1255 Pt tolerated treatment well. PIV removed per protocol. D/c home ambulatory in no distress.  Pt aware of next appointment scheduled for   Future Appointments   Date Time Provider Dania Hunter   9/19/2022 11:00 AM [address]   9/26/2022 10:00 AM G2 [facility]   10/3/2022 10:00 AM G1 [place] [address]   10/10/2022 10:00 AM H1 [place] [facility]   12/19/2022 10:00 AM Latasha Watters MD ONC BS AMB   1/25/2023 10:00 AM Kwesi Avila NP [facility] BS AMB

## 2022-09-19 ENCOUNTER — HOSPITAL ENCOUNTER (OUTPATIENT)
Dept: INFUSION THERAPY | Age: 37
Discharge: HOME | End: 2022-09-19
Payer: COMMERCIAL

## 2022-09-19 VITALS
RESPIRATION RATE: 18 BRPM | DIASTOLIC BLOOD PRESSURE: 64 MMHG | SYSTOLIC BLOOD PRESSURE: 112 MMHG | HEART RATE: 98 BPM | TEMPERATURE: 97.5 F

## 2022-09-19 DIAGNOSIS — D50.9 IRON DEFICIENCY ANEMIA, UNSPECIFIED IRON DEFICIENCY ANEMIA TYPE: Primary | ICD-10-CM

## 2022-09-19 PROCEDURE — 74011250636 HC RX REV CODE- 250/636: Performed by: STUDENT IN AN ORGANIZED HEALTH CARE EDUCATION/TRAINING PROGRAM

## 2022-09-19 PROCEDURE — 74011000250 HC RX REV CODE- 250: Performed by: STUDENT IN AN ORGANIZED HEALTH CARE EDUCATION/TRAINING PROGRAM

## 2022-09-19 PROCEDURE — 96374 THER/PROPH/DIAG INJ IV PUSH: CPT

## 2022-09-19 RX ORDER — SODIUM CHLORIDE 0.9 % (FLUSH) 0.9 %
5-40 SYRINGE (ML) INJECTION AS NEEDED
Status: DISCONTINUED | OUTPATIENT
Start: 2022-09-19 | End: 2022-09-20 | Stop reason: HOSPADM

## 2022-09-19 RX ORDER — SODIUM CHLORIDE 9 MG/ML
5-250 INJECTION, SOLUTION INTRAVENOUS AS NEEDED
Status: DISCONTINUED | OUTPATIENT
Start: 2022-09-19 | End: 2022-09-20 | Stop reason: HOSPADM

## 2022-09-19 RX ADMIN — IRON SUCROSE 200 MG: 20 INJECTION, SOLUTION INTRAVENOUS at 12:35

## 2022-09-19 RX ADMIN — SODIUM CHLORIDE, PRESERVATIVE FREE 10 ML: 5 INJECTION INTRAVENOUS at 13:07

## 2022-09-19 RX ADMIN — SODIUM CHLORIDE 25 ML/HR: 900 INJECTION, SOLUTION INTRAVENOUS at 12:25

## 2022-09-19 NOTE — PROGRESS NOTES
[facility] Progress Note    Date: September 19, 2022        110: Pt arrived ambulatory to [facility] for venofer 2/5 in stable condition. Assessment completed. PIV placed to Left AC with positive blood return. Patient Vitals for the past 12 hrs:   Temp Pulse Resp BP   09/19/22 1353 -- 98 -- 112/64   09/19/22 1215 97.5 °F (36.4 °C) (!) 102 18 127/81         Medications Administered       0.9% sodium chloride infusion       Admin Date  09/19/2022 Action  New Bag Dose  25 mL/hr Rate  25 mL/hr Route  IntraVENous Administered By  Tommie Carranza RN              iron sucrose (VENOFER) injection 200 mg       Admin Date  09/19/2022 Action  Given Dose  200 mg Route  IntraVENous Administered By  Tommie Carranza RN              sodium chloride (NS) flush 5-40 mL       Admin Date  09/19/2022 Action  Given Dose  10 mL Route  IntraVENous Administered By  Tommie Carranza RN                       Ms. Radha King tolerated the infusion, and had no complaints. Waited for 30 min post infusion. PIV flushed and removed. 2x2 and coban placed    Ms. Radha King was discharged from [place] [address] in stable condition. Patient is aware of next scheduled [facility] appointment.          Future Appointments   Date Time Provider Dania Hunter   10/3/2022 10:00 AM [address]   10/10/2022 10:00 [facility]   10/17/2022 10:30 AM H1 [facility] [facility]   12/19/2022 10:00 AM Alesia Greene MD ONC BS AMB   1/25/2023 10:00 AM Lord Harley NP [facility] BS AMB         Zamzam Roberson, RN, RN  September 19, 2022

## 2022-09-26 ENCOUNTER — HOSPITAL ENCOUNTER (OUTPATIENT)
Dept: INFUSION THERAPY | Age: 37
End: 2022-09-26

## 2022-10-03 ENCOUNTER — HOSPITAL ENCOUNTER (OUTPATIENT)
Dept: INFUSION THERAPY | Age: 37
End: 2022-10-03

## 2022-10-10 ENCOUNTER — HOSPITAL ENCOUNTER (OUTPATIENT)
Dept: INFUSION THERAPY | Age: 37
Discharge: HOME | End: 2022-10-10
Payer: COMMERCIAL

## 2022-10-10 VITALS
DIASTOLIC BLOOD PRESSURE: 68 MMHG | HEART RATE: 86 BPM | BODY MASS INDEX: 51.18 KG/M2 | TEMPERATURE: 97.5 F | SYSTOLIC BLOOD PRESSURE: 102 MMHG | RESPIRATION RATE: 18 BRPM | WEIGHT: 279.8 LBS

## 2022-10-10 DIAGNOSIS — D50.9 IRON DEFICIENCY ANEMIA, UNSPECIFIED IRON DEFICIENCY ANEMIA TYPE: Primary | ICD-10-CM

## 2022-10-10 PROCEDURE — 96374 THER/PROPH/DIAG INJ IV PUSH: CPT

## 2022-10-10 PROCEDURE — 74011000250 HC RX REV CODE- 250: Performed by: STUDENT IN AN ORGANIZED HEALTH CARE EDUCATION/TRAINING PROGRAM

## 2022-10-10 PROCEDURE — 74011250636 HC RX REV CODE- 250/636: Performed by: STUDENT IN AN ORGANIZED HEALTH CARE EDUCATION/TRAINING PROGRAM

## 2022-10-10 RX ORDER — DIPHENHYDRAMINE HYDROCHLORIDE 50 MG/ML
25 INJECTION, SOLUTION INTRAMUSCULAR; INTRAVENOUS AS NEEDED
Status: ACTIVE | OUTPATIENT
Start: 2022-10-10 | End: 2022-10-10

## 2022-10-10 RX ORDER — ACETAMINOPHEN 325 MG/1
650 TABLET ORAL AS NEEDED
Status: ACTIVE | OUTPATIENT
Start: 2022-10-10 | End: 2022-10-10

## 2022-10-10 RX ORDER — SODIUM CHLORIDE 9 MG/ML
5-250 INJECTION, SOLUTION INTRAVENOUS AS NEEDED
Status: DISPENSED | OUTPATIENT
Start: 2022-10-10 | End: 2022-10-10

## 2022-10-10 RX ORDER — SODIUM CHLORIDE 0.9 % (FLUSH) 0.9 %
5-40 SYRINGE (ML) INJECTION AS NEEDED
Status: DISPENSED | OUTPATIENT
Start: 2022-10-10 | End: 2022-10-10

## 2022-10-10 RX ORDER — ONDANSETRON 2 MG/ML
8 INJECTION INTRAMUSCULAR; INTRAVENOUS AS NEEDED
Status: ACTIVE | OUTPATIENT
Start: 2022-10-10 | End: 2022-10-10

## 2022-10-10 RX ADMIN — IRON SUCROSE 200 MG: 20 INJECTION, SOLUTION INTRAVENOUS at 11:20

## 2022-10-10 RX ADMIN — SODIUM CHLORIDE, PRESERVATIVE FREE 10 ML: 5 INJECTION INTRAVENOUS at 11:21

## 2022-10-10 NOTE — PROGRESS NOTES
[facility] Short Note                       Date: October 10, 2022    Name: Alannah Sparks    MRN: [id number]         : 1985      1010 Pt admit to [facility] for venofer 3/5 ambulatory in stable condition. Assessment completed. No new concerns voiced. Ms. Ino Cummings vitals were reviewed prior to and after treatment. Patient Vitals for the past 12 hrs:   Temp Pulse Resp BP   10/10/22 1217 -- 86 -- 102/68   10/10/22 1015 97.5 °F (36.4 °C) 90 18 107/72           Medications given:   Medications Administered       iron sucrose (VENOFER) injection 200 mg       Admin Date  10/10/2022 Action  Given Dose  200 mg Route  IntraVENous Administered By  Apolinar Yeager RN              sodium chloride (NS) flush 5-40 mL       Admin Date  10/10/2022 Action  Given Dose  10 mL Route  IntraVENous Administered By  Apolinar Yeager RN                    PIV removed post 30 min venofer observation period. 2x2 and coban placed. Ms. Ronan Almonte tolerated the infusion, and had no complaints. Ms. Ronan Almonte was discharged from [place] [address] in stable condition.      Future Appointments   Date Time Provider Dania Hunter   10/17/2022 10:30 AM H1 [place] [facility]   10/24/2022  1:00 PM G2 [facility] [facility]   2022 10:00 AM Sawyer Tran MD ONC MARV BYRNE   2023 10:00 AM Radha Laurent., NP [facility] MARV Moore RN  October 10, 2022  4:14 PM

## 2022-10-24 ENCOUNTER — HOSPITAL ENCOUNTER (OUTPATIENT)
Dept: INFUSION THERAPY | Age: 37
Discharge: HOME | End: 2022-10-24

## 2022-10-24 RX ORDER — DIPHENHYDRAMINE HYDROCHLORIDE 50 MG/ML
50 INJECTION, SOLUTION INTRAMUSCULAR; INTRAVENOUS AS NEEDED
Status: CANCELLED
Start: 2022-11-07

## 2022-10-24 RX ORDER — HYDROCORTISONE SODIUM SUCCINATE 100 MG/2ML
100 INJECTION, POWDER, FOR SOLUTION INTRAMUSCULAR; INTRAVENOUS AS NEEDED
Status: CANCELLED | OUTPATIENT
Start: 2022-11-07

## 2022-10-24 RX ORDER — SODIUM CHLORIDE 0.9 % (FLUSH) 0.9 %
5-40 SYRINGE (ML) INJECTION AS NEEDED
Status: CANCELLED | OUTPATIENT
Start: 2022-11-07

## 2022-10-24 RX ORDER — ONDANSETRON 2 MG/ML
8 INJECTION INTRAMUSCULAR; INTRAVENOUS AS NEEDED
Status: CANCELLED | OUTPATIENT
Start: 2022-11-07

## 2022-10-24 RX ORDER — EPINEPHRINE 1 MG/ML
0.3 INJECTION, SOLUTION, CONCENTRATE INTRAVENOUS AS NEEDED
Status: CANCELLED | OUTPATIENT
Start: 2022-11-07

## 2022-10-24 RX ORDER — DIPHENHYDRAMINE HYDROCHLORIDE 50 MG/ML
25 INJECTION, SOLUTION INTRAMUSCULAR; INTRAVENOUS AS NEEDED
Status: CANCELLED
Start: 2022-11-07

## 2022-10-24 RX ORDER — ACETAMINOPHEN 325 MG/1
650 TABLET ORAL AS NEEDED
Status: CANCELLED
Start: 2022-11-07

## 2022-10-24 RX ORDER — SODIUM CHLORIDE 9 MG/ML
5-40 INJECTION INTRAMUSCULAR; INTRAVENOUS; SUBCUTANEOUS AS NEEDED
Status: CANCELLED | OUTPATIENT
Start: 2022-11-07

## 2022-10-24 RX ORDER — SODIUM CHLORIDE 9 MG/ML
5-250 INJECTION, SOLUTION INTRAVENOUS AS NEEDED
Status: CANCELLED | OUTPATIENT
Start: 2022-11-07

## 2022-10-24 RX ORDER — HEPARIN 100 UNIT/ML
500 SYRINGE INTRAVENOUS AS NEEDED
Status: CANCELLED
Start: 2022-11-07

## 2022-10-24 RX ORDER — ALBUTEROL SULFATE 0.83 MG/ML
2.5 SOLUTION RESPIRATORY (INHALATION) AS NEEDED
Status: CANCELLED
Start: 2022-11-07

## 2022-10-31 ENCOUNTER — HOSPITAL ENCOUNTER (OUTPATIENT)
Dept: INFUSION THERAPY | Age: 37
Discharge: HOME | End: 2022-10-31
Payer: COMMERCIAL

## 2022-10-31 VITALS
HEART RATE: 82 BPM | DIASTOLIC BLOOD PRESSURE: 73 MMHG | SYSTOLIC BLOOD PRESSURE: 145 MMHG | RESPIRATION RATE: 18 BRPM | TEMPERATURE: 97.6 F | OXYGEN SATURATION: 98 %

## 2022-10-31 DIAGNOSIS — D50.9 IRON DEFICIENCY ANEMIA, UNSPECIFIED IRON DEFICIENCY ANEMIA TYPE: Primary | ICD-10-CM

## 2022-10-31 PROCEDURE — 74011250636 HC RX REV CODE- 250/636: Performed by: STUDENT IN AN ORGANIZED HEALTH CARE EDUCATION/TRAINING PROGRAM

## 2022-10-31 PROCEDURE — 96374 THER/PROPH/DIAG INJ IV PUSH: CPT

## 2022-10-31 PROCEDURE — 74011000250 HC RX REV CODE- 250: Performed by: STUDENT IN AN ORGANIZED HEALTH CARE EDUCATION/TRAINING PROGRAM

## 2022-10-31 RX ORDER — ALBUTEROL SULFATE 0.83 MG/ML
2.5 SOLUTION RESPIRATORY (INHALATION) AS NEEDED
Status: ACTIVE | OUTPATIENT
Start: 2022-10-31 | End: 2022-10-31

## 2022-10-31 RX ORDER — SODIUM CHLORIDE 9 MG/ML
5-250 INJECTION, SOLUTION INTRAVENOUS AS NEEDED
Status: DISPENSED | OUTPATIENT
Start: 2022-10-31 | End: 2022-10-31

## 2022-10-31 RX ORDER — SODIUM CHLORIDE 9 MG/ML
5-40 INJECTION INTRAMUSCULAR; INTRAVENOUS; SUBCUTANEOUS AS NEEDED
Status: ACTIVE | OUTPATIENT
Start: 2022-10-31 | End: 2022-10-31

## 2022-10-31 RX ORDER — HYDROCORTISONE SODIUM SUCCINATE 100 MG/2ML
100 INJECTION, POWDER, FOR SOLUTION INTRAMUSCULAR; INTRAVENOUS AS NEEDED
Status: ACTIVE | OUTPATIENT
Start: 2022-10-31 | End: 2022-10-31

## 2022-10-31 RX ORDER — SODIUM CHLORIDE 0.9 % (FLUSH) 0.9 %
5-40 SYRINGE (ML) INJECTION AS NEEDED
Status: DISPENSED | OUTPATIENT
Start: 2022-10-31 | End: 2022-10-31

## 2022-10-31 RX ORDER — DIPHENHYDRAMINE HYDROCHLORIDE 50 MG/ML
50 INJECTION, SOLUTION INTRAMUSCULAR; INTRAVENOUS AS NEEDED
Status: ACTIVE | OUTPATIENT
Start: 2022-10-31 | End: 2022-10-31

## 2022-10-31 RX ORDER — EPINEPHRINE 1 MG/ML
0.3 INJECTION, SOLUTION, CONCENTRATE INTRAVENOUS AS NEEDED
Status: ACTIVE | OUTPATIENT
Start: 2022-10-31 | End: 2022-10-31

## 2022-10-31 RX ORDER — DIPHENHYDRAMINE HYDROCHLORIDE 50 MG/ML
25 INJECTION, SOLUTION INTRAMUSCULAR; INTRAVENOUS AS NEEDED
Status: ACTIVE | OUTPATIENT
Start: 2022-10-31 | End: 2022-10-31

## 2022-10-31 RX ORDER — ONDANSETRON 2 MG/ML
8 INJECTION INTRAMUSCULAR; INTRAVENOUS AS NEEDED
Status: ACTIVE | OUTPATIENT
Start: 2022-10-31 | End: 2022-10-31

## 2022-10-31 RX ORDER — HEPARIN 100 UNIT/ML
500 SYRINGE INTRAVENOUS AS NEEDED
Status: ACTIVE | OUTPATIENT
Start: 2022-10-31 | End: 2022-10-31

## 2022-10-31 RX ORDER — ACETAMINOPHEN 325 MG/1
650 TABLET ORAL AS NEEDED
Status: ACTIVE | OUTPATIENT
Start: 2022-10-31 | End: 2022-10-31

## 2022-10-31 RX ADMIN — SODIUM CHLORIDE, PRESERVATIVE FREE 10 ML: 5 INJECTION INTRAVENOUS at 11:08

## 2022-10-31 RX ADMIN — IRON SUCROSE 200 MG: 20 INJECTION, SOLUTION INTRAVENOUS at 11:06

## 2022-10-31 NOTE — PROGRESS NOTES
[facility] Short Note                       Date: 2022    Name: Jonathon Maher    MRN: [id number]         : 1985      1030 Pt admit to [facility] for VENOFER ambulatory in stable condition. Assessment completed. No new concerns. PIV placed L AC w pos blood return noted     Ms. Blood vitals were reviewed prior to and after treatment. Patient Vitals for the past 12 hrs:   Temp Pulse Resp BP SpO2   10/31/22 1030 97.6 °F (36.4 °C) 80 18 (!) 114/55 --   10/31/22 1000 -- -- -- -- 98 %           No results found for this or any previous visit (from the past 12 hour(s)). Medications given:   Medications Administered       iron sucrose (VENOFER) injection 200 mg       Admin Date  10/31/2022 Action  Given Dose  200 mg Route  IntraVENous Administered By  [facility]              sodium chloride (NS) flush 5-40 mL       Admin Date  10/31/2022 Action  Given Dose  10 mL Route  IntraVENous Administered By  [facility]                Venofer 200mg IV push administered        Ms. Bates tolerated the push and had no complaints. Ms. Emily Dumas was discharged from [place] [address] in stable condition.      Future Appointments   Date Time Provider Dania Hunter   2022 10:30 AM G1 [facility] [facility]   2022  3:00 PM H1 [facility] [facility]   2022 10:00 AM Neftali Zepeda MD ONC MARV BYRNE   2023 10:00 AM Shante Grewal, [facility] BS CATY Bhatt  2022  11:15 AM

## 2022-11-07 ENCOUNTER — HOSPITAL ENCOUNTER (OUTPATIENT)
Dept: INFUSION THERAPY | Age: 37
Discharge: HOME | End: 2022-11-07
Payer: COMMERCIAL

## 2022-11-07 VITALS
DIASTOLIC BLOOD PRESSURE: 75 MMHG | RESPIRATION RATE: 16 BRPM | SYSTOLIC BLOOD PRESSURE: 106 MMHG | TEMPERATURE: 96 F | HEART RATE: 71 BPM

## 2022-11-07 DIAGNOSIS — D50.9 IRON DEFICIENCY ANEMIA, UNSPECIFIED IRON DEFICIENCY ANEMIA TYPE: Primary | ICD-10-CM

## 2022-11-07 PROCEDURE — 96374 THER/PROPH/DIAG INJ IV PUSH: CPT

## 2022-11-07 PROCEDURE — 74011250636 HC RX REV CODE- 250/636: Performed by: NURSE PRACTITIONER

## 2022-11-07 RX ORDER — ACETAMINOPHEN 325 MG/1
650 TABLET ORAL AS NEEDED
Start: 2022-11-22

## 2022-11-07 RX ORDER — SODIUM CHLORIDE 9 MG/ML
5-250 INJECTION, SOLUTION INTRAVENOUS AS NEEDED
OUTPATIENT
Start: 2022-11-22

## 2022-11-07 RX ORDER — DIPHENHYDRAMINE HYDROCHLORIDE 50 MG/ML
50 INJECTION, SOLUTION INTRAMUSCULAR; INTRAVENOUS AS NEEDED
Start: 2022-11-22

## 2022-11-07 RX ORDER — ALBUTEROL SULFATE 0.83 MG/ML
2.5 SOLUTION RESPIRATORY (INHALATION) AS NEEDED
Start: 2022-11-22

## 2022-11-07 RX ORDER — DIPHENHYDRAMINE HYDROCHLORIDE 50 MG/ML
50 INJECTION, SOLUTION INTRAMUSCULAR; INTRAVENOUS AS NEEDED
Status: ACTIVE | OUTPATIENT
Start: 2022-11-07 | End: 2022-11-07

## 2022-11-07 RX ORDER — EPINEPHRINE 1 MG/ML
0.3 INJECTION, SOLUTION, CONCENTRATE INTRAVENOUS AS NEEDED
Status: ACTIVE | OUTPATIENT
Start: 2022-11-07 | End: 2022-11-07

## 2022-11-07 RX ORDER — HEPARIN 100 UNIT/ML
500 SYRINGE INTRAVENOUS AS NEEDED
Status: ACTIVE | OUTPATIENT
Start: 2022-11-07 | End: 2022-11-07

## 2022-11-07 RX ORDER — ACETAMINOPHEN 325 MG/1
650 TABLET ORAL AS NEEDED
Status: ACTIVE | OUTPATIENT
Start: 2022-11-07 | End: 2022-11-07

## 2022-11-07 RX ORDER — ONDANSETRON 2 MG/ML
8 INJECTION INTRAMUSCULAR; INTRAVENOUS AS NEEDED
Status: ACTIVE | OUTPATIENT
Start: 2022-11-07 | End: 2022-11-07

## 2022-11-07 RX ORDER — HYDROCORTISONE SODIUM SUCCINATE 100 MG/2ML
100 INJECTION, POWDER, FOR SOLUTION INTRAMUSCULAR; INTRAVENOUS AS NEEDED
Status: ACTIVE | OUTPATIENT
Start: 2022-11-07 | End: 2022-11-07

## 2022-11-07 RX ORDER — ALBUTEROL SULFATE 0.83 MG/ML
2.5 SOLUTION RESPIRATORY (INHALATION) AS NEEDED
Status: ACTIVE | OUTPATIENT
Start: 2022-11-07 | End: 2022-11-07

## 2022-11-07 RX ORDER — SODIUM CHLORIDE 0.9 % (FLUSH) 0.9 %
5-40 SYRINGE (ML) INJECTION AS NEEDED
Status: DISPENSED | OUTPATIENT
Start: 2022-11-07 | End: 2022-11-07

## 2022-11-07 RX ORDER — SODIUM CHLORIDE 9 MG/ML
5-250 INJECTION, SOLUTION INTRAVENOUS AS NEEDED
Status: DISPENSED | OUTPATIENT
Start: 2022-11-07 | End: 2022-11-07

## 2022-11-07 RX ORDER — HEPARIN 100 UNIT/ML
500 SYRINGE INTRAVENOUS AS NEEDED
Start: 2022-11-22

## 2022-11-07 RX ORDER — HYDROCORTISONE SODIUM SUCCINATE 100 MG/2ML
100 INJECTION, POWDER, FOR SOLUTION INTRAMUSCULAR; INTRAVENOUS AS NEEDED
OUTPATIENT
Start: 2022-11-22

## 2022-11-07 RX ORDER — EPINEPHRINE 1 MG/ML
0.3 INJECTION, SOLUTION, CONCENTRATE INTRAVENOUS AS NEEDED
OUTPATIENT
Start: 2022-11-22

## 2022-11-07 RX ORDER — DIPHENHYDRAMINE HYDROCHLORIDE 50 MG/ML
25 INJECTION, SOLUTION INTRAMUSCULAR; INTRAVENOUS AS NEEDED
Status: ACTIVE | OUTPATIENT
Start: 2022-11-07 | End: 2022-11-07

## 2022-11-07 RX ORDER — SODIUM CHLORIDE 9 MG/ML
5-40 INJECTION INTRAMUSCULAR; INTRAVENOUS; SUBCUTANEOUS AS NEEDED
OUTPATIENT
Start: 2022-11-22

## 2022-11-07 RX ORDER — SODIUM CHLORIDE 9 MG/ML
5-40 INJECTION INTRAMUSCULAR; INTRAVENOUS; SUBCUTANEOUS AS NEEDED
Status: ACTIVE | OUTPATIENT
Start: 2022-11-07 | End: 2022-11-07

## 2022-11-07 RX ORDER — DIPHENHYDRAMINE HYDROCHLORIDE 50 MG/ML
25 INJECTION, SOLUTION INTRAMUSCULAR; INTRAVENOUS AS NEEDED
Start: 2022-11-22

## 2022-11-07 RX ORDER — ONDANSETRON 2 MG/ML
8 INJECTION INTRAMUSCULAR; INTRAVENOUS AS NEEDED
OUTPATIENT
Start: 2022-11-22

## 2022-11-07 RX ORDER — SODIUM CHLORIDE 0.9 % (FLUSH) 0.9 %
5-40 SYRINGE (ML) INJECTION AS NEEDED
OUTPATIENT
Start: 2022-11-22

## 2022-11-07 RX ADMIN — IRON SUCROSE 200 MG: 20 INJECTION, SOLUTION INTRAVENOUS at 11:34

## 2022-11-07 NOTE — PROGRESS NOTES
[facility] Short Note                   1100 Pt admit to [facility] for Venofer ambulatory in stable condition. Assessment completed. No new concerns voiced. PIV established in [facility] with brisk blood return. NS infusing KVO. Ms. Brannon Palacios vitals were reviewed prior to and after treatment. Patient Vitals for the past 12 hrs:   Temp Pulse Resp BP   11/07/22 1158 -- 71 -- 106/75   11/07/22 1103 (!) 96 °F (35.6 °C) 80 16 121/74         Medications Administered       iron sucrose (VENOFER) injection 200 mg       Admin Date  11/07/2022 Action  Given Dose  200 mg Route  IntraVENous Administered By  Zena Herman RN             Ms. Tammy Rocha tolerated the infusion, and had no complaints. Patient stayed for 30 minute observation period without incident. PIV removed without difficulty. Ms. Tammy Rocha was discharged from [place] [address] in stable condition. Patient is aware of next appointment.      Future Appointments   Date Time Provider Dania Hunter   11/14/2022  3:00 PM [address] [facility]    12/19/2022 10:00 AM Lázaro Simons MD ONC BS AMB   1/25/2023 10:00 AM Valentin Arzate., NP [facility] BS AMB       Andi Golden RN  November 7, 2022

## 2022-11-14 ENCOUNTER — HOSPITAL ENCOUNTER (OUTPATIENT)
Dept: INFUSION THERAPY | Age: 37
End: 2022-11-14

## 2022-11-18 ENCOUNTER — HOSPITAL ENCOUNTER (OUTPATIENT)
Dept: INFUSION THERAPY | Age: 37
End: 2022-11-18

## 2022-12-05 ENCOUNTER — HOSPITAL ENCOUNTER (OUTPATIENT)
Dept: INFUSION THERAPY | Age: 37
Discharge: HOME | End: 2022-12-05
Payer: COMMERCIAL

## 2022-12-05 VITALS
DIASTOLIC BLOOD PRESSURE: 73 MMHG | SYSTOLIC BLOOD PRESSURE: 114 MMHG | HEART RATE: 86 BPM | TEMPERATURE: 97.7 F | RESPIRATION RATE: 16 BRPM

## 2022-12-05 DIAGNOSIS — D50.9 IRON DEFICIENCY ANEMIA, UNSPECIFIED IRON DEFICIENCY ANEMIA TYPE: Primary | ICD-10-CM

## 2022-12-05 PROCEDURE — 96374 THER/PROPH/DIAG INJ IV PUSH: CPT

## 2022-12-05 PROCEDURE — 74011250636 HC RX REV CODE- 250/636: Performed by: STUDENT IN AN ORGANIZED HEALTH CARE EDUCATION/TRAINING PROGRAM

## 2022-12-05 RX ORDER — SODIUM CHLORIDE 9 MG/ML
5-40 INJECTION INTRAMUSCULAR; INTRAVENOUS; SUBCUTANEOUS AS NEEDED
Status: ACTIVE | OUTPATIENT
Start: 2022-12-05 | End: 2022-12-05

## 2022-12-05 RX ORDER — DIPHENHYDRAMINE HYDROCHLORIDE 50 MG/ML
50 INJECTION, SOLUTION INTRAMUSCULAR; INTRAVENOUS AS NEEDED
Status: ACTIVE | OUTPATIENT
Start: 2022-12-05 | End: 2022-12-05

## 2022-12-05 RX ORDER — SODIUM CHLORIDE 9 MG/ML
5-250 INJECTION, SOLUTION INTRAVENOUS AS NEEDED
Status: DISPENSED | OUTPATIENT
Start: 2022-12-05 | End: 2022-12-05

## 2022-12-05 RX ORDER — SODIUM CHLORIDE 0.9 % (FLUSH) 0.9 %
5-40 SYRINGE (ML) INJECTION AS NEEDED
Status: DISPENSED | OUTPATIENT
Start: 2022-12-05 | End: 2022-12-05

## 2022-12-05 RX ORDER — ACETAMINOPHEN 325 MG/1
650 TABLET ORAL AS NEEDED
Status: ACTIVE | OUTPATIENT
Start: 2022-12-05 | End: 2022-12-05

## 2022-12-05 RX ORDER — ALBUTEROL SULFATE 0.83 MG/ML
2.5 SOLUTION RESPIRATORY (INHALATION) AS NEEDED
Status: ACTIVE | OUTPATIENT
Start: 2022-12-05 | End: 2022-12-05

## 2022-12-05 RX ORDER — ONDANSETRON 2 MG/ML
8 INJECTION INTRAMUSCULAR; INTRAVENOUS AS NEEDED
Status: ACTIVE | OUTPATIENT
Start: 2022-12-05 | End: 2022-12-05

## 2022-12-05 RX ORDER — HYDROCORTISONE SODIUM SUCCINATE 100 MG/2ML
100 INJECTION, POWDER, FOR SOLUTION INTRAMUSCULAR; INTRAVENOUS AS NEEDED
Status: ACTIVE | OUTPATIENT
Start: 2022-12-05 | End: 2022-12-05

## 2022-12-05 RX ORDER — EPINEPHRINE 1 MG/ML
0.3 INJECTION, SOLUTION, CONCENTRATE INTRAVENOUS AS NEEDED
Status: ACTIVE | OUTPATIENT
Start: 2022-12-05 | End: 2022-12-05

## 2022-12-05 RX ORDER — HEPARIN 100 UNIT/ML
500 SYRINGE INTRAVENOUS AS NEEDED
Status: ACTIVE | OUTPATIENT
Start: 2022-12-05 | End: 2022-12-05

## 2022-12-05 RX ORDER — DIPHENHYDRAMINE HYDROCHLORIDE 50 MG/ML
25 INJECTION, SOLUTION INTRAMUSCULAR; INTRAVENOUS AS NEEDED
Status: ACTIVE | OUTPATIENT
Start: 2022-12-05 | End: 2022-12-05

## 2022-12-05 RX ADMIN — IRON SUCROSE 200 MG: 20 INJECTION, SOLUTION INTRAVENOUS at 08:57

## 2022-12-05 NOTE — PROGRESS NOTES
[facility] Peds/Adult Note                       Date: 2022    Name: Jimmy Laureano    MRN: [id number]         : 1985    0840 Patient arrives for Venofer without acute problems. Please see [facility] for complete assessment and education provided. Vital signs stable throughout and prior to discharge. Patient tolerated procedure well and was discharged without incident. Patient is aware of no further [facility] appointments and to follow up with referring provider for any questions or concerns. Ms. Horacio Aggarwal vitals were reviewed prior to and after treatment. Patient Vitals for the past 12 hrs:   Temp Pulse Resp BP   22 0939 -- 86 16 114/73   22 0844 97.7 °F (36.5 °C) 93 18 123/80       Lab results:  No results found for this or any previous visit (from the past 12 hour(s)). Medications given:   Medications Administered       iron sucrose (VENOFER) injection 200 mg       Admin Date  2022 Action  Given Dose  200 mg Route  IntraVENous Administered By  Venice Vargas RN             Ms. Valarie Fay tolerated the infusion, and had no complaints. Ms. Valarie Fay was discharged from [place] [address] in stable condition. Discharge Instructions provided to patient, patient verbalized understanding but denied the request for a copy of d/c instructions.      Future Appointments   Date Time Provider Dania Hunter   2022 10:00 AM Andrei Patel MD ONC BS CATY   2023 10:00 AM Delilah Dolan., NP [facility] BS CATY Dunbar RN  2022  10:13 AM

## 2023-01-08 DIAGNOSIS — M54.16 LUMBAR RADICULOPATHY: ICD-10-CM

## 2023-01-09 ENCOUNTER — TELEPHONE (OUTPATIENT)
Dept: ONCOLOGY | Age: 38
End: 2023-01-09

## 2023-01-09 RX ORDER — GABAPENTIN 300 MG/1
CAPSULE ORAL
Qty: 90 CAPSULE | Refills: 0 | Status: SHIPPED | OUTPATIENT
Start: 2023-01-09

## 2023-01-09 NOTE — TELEPHONE ENCOUNTER
----- Message from Christi Rizzo sent at 1/9/2023 10:13 AM EST -----  Regarding: missed [facility]  Appt at [facility] no showed, tried to call number on file disconnected.

## 2023-01-09 NOTE — TELEPHONE ENCOUNTER
Last visit 07/21/2022 NP Jose Qiu   Next appointment 01/25/2023 NP Jose Qiu   Previous refill encounter(s)   08/16/2022 Neurontin #90 with 2 refills. No access to [facility] [address] Only    Program: Medication Refill  Intervention Detail: New Rx: 1, reason: Patient Preference  Time Spent (min): 5      Requested Prescriptions     Pending Prescriptions Disp Refills    gabapentin (NEURONTIN) 300 mg capsule 90 Capsule 0     Sig: TAKE 1 CAPSULE BY MOUTH TWICE DAILY .  DO NOT EXCEED 600MG

## 2023-01-27 DIAGNOSIS — I26.99 PULMONARY EMBOLISM, OTHER, UNSPECIFIED CHRONICITY, UNSPECIFIED WHETHER ACUTE COR PULMONALE PRESENT (HCC): ICD-10-CM

## 2023-01-27 DIAGNOSIS — D64.9 NORMOCYTIC ANEMIA: ICD-10-CM

## 2023-01-28 LAB
ALBUMIN SERPL-MCNC: 3.8 G/DL (ref 3.5–5)
ALBUMIN/GLOB SERPL: 1.2 (ref 1.1–2.2)
ALP SERPL-CCNC: 68 U/L (ref 45–117)
ALT SERPL-CCNC: 30 U/L (ref 12–78)
ANION GAP SERPL CALC-SCNC: 4 MMOL/L (ref 5–15)
AST SERPL-CCNC: 9 U/L (ref 15–37)
BASOPHILS # BLD: 0 K/UL (ref 0–0.1)
BASOPHILS NFR BLD: 0 % (ref 0–1)
BILIRUB SERPL-MCNC: 0.4 MG/DL (ref 0.2–1)
BUN SERPL-MCNC: 5 MG/DL (ref 6–20)
BUN/CREAT SERPL: 7 (ref 12–20)
CALCIUM SERPL-MCNC: 9.2 MG/DL (ref 8.5–10.1)
CHLORIDE SERPL-SCNC: 107 MMOL/L (ref 97–108)
CO2 SERPL-SCNC: 29 MMOL/L (ref 21–32)
CREAT SERPL-MCNC: 0.73 MG/DL (ref 0.55–1.02)
D DIMER PPP FEU-MCNC: 0.32 MG/L FEU (ref 0–0.65)
DIFFERENTIAL METHOD BLD: ABNORMAL
EOSINOPHIL # BLD: 0.1 K/UL (ref 0–0.4)
EOSINOPHIL NFR BLD: 2 % (ref 0–7)
ERYTHROCYTE [DISTWIDTH] IN BLOOD BY AUTOMATED COUNT: 14.8 % (ref 11.5–14.5)
FERRITIN SERPL-MCNC: 86 NG/ML (ref 26–388)
GLOBULIN SER CALC-MCNC: 3.3 G/DL (ref 2–4)
GLUCOSE SERPL-MCNC: 84 MG/DL (ref 65–100)
HCT VFR BLD AUTO: 40.5 % (ref 35–47)
HGB BLD-MCNC: 12.7 G/DL (ref 11.5–16)
IMM GRANULOCYTES # BLD AUTO: 0 K/UL (ref 0–0.04)
IMM GRANULOCYTES NFR BLD AUTO: 0 % (ref 0–0.5)
IRON SATN MFR SERPL: 34 % (ref 20–50)
IRON SERPL-MCNC: 105 UG/DL (ref 35–150)
LYMPHOCYTES # BLD: 1.9 K/UL (ref 0.8–3.5)
LYMPHOCYTES NFR BLD: 30 % (ref 12–49)
MCH RBC QN AUTO: 30 PG (ref 26–34)
MCHC RBC AUTO-ENTMCNC: 31.4 G/DL (ref 30–36.5)
MCV RBC AUTO: 95.7 FL (ref 80–99)
MONOCYTES # BLD: 0.5 K/UL (ref 0–1)
MONOCYTES NFR BLD: 7 % (ref 5–13)
NEUTS SEG # BLD: 3.9 K/UL (ref 1.8–8)
NEUTS SEG NFR BLD: 61 % (ref 32–75)
NRBC # BLD: 0 K/UL (ref 0–0.01)
NRBC BLD-RTO: 0 PER 100 WBC
PLATELET # BLD AUTO: 306 K/UL (ref 150–400)
PMV BLD AUTO: 10.2 FL (ref 8.9–12.9)
POTASSIUM SERPL-SCNC: 4.3 MMOL/L (ref 3.5–5.1)
PROT SERPL-MCNC: 7.1 G/DL (ref 6.4–8.2)
RBC # BLD AUTO: 4.23 M/UL (ref 3.8–5.2)
SODIUM SERPL-SCNC: 140 MMOL/L (ref 136–145)
TIBC SERPL-MCNC: 307 UG/DL (ref 250–450)
WBC # BLD AUTO: 6.5 K/UL (ref 3.6–11)

## 2023-01-30 ENCOUNTER — OFFICE VISIT (OUTPATIENT)
Dept: ONCOLOGY | Age: 38
End: 2023-01-30
Payer: COMMERCIAL

## 2023-01-30 VITALS
BODY MASS INDEX: 46.38 KG/M2 | SYSTOLIC BLOOD PRESSURE: 103 MMHG | HEIGHT: 62 IN | WEIGHT: 252 LBS | RESPIRATION RATE: 16 BRPM | HEART RATE: 91 BPM | TEMPERATURE: 98.2 F | DIASTOLIC BLOOD PRESSURE: 61 MMHG

## 2023-01-30 DIAGNOSIS — I26.99 PULMONARY EMBOLISM, OTHER, UNSPECIFIED CHRONICITY, UNSPECIFIED WHETHER ACUTE COR PULMONALE PRESENT (HCC): Primary | ICD-10-CM

## 2023-01-30 DIAGNOSIS — Z01.419 WELL WOMAN EXAM WITH ROUTINE GYNECOLOGICAL EXAM: ICD-10-CM

## 2023-01-30 PROCEDURE — 99214 OFFICE O/P EST MOD 30 MIN: CPT | Performed by: STUDENT IN AN ORGANIZED HEALTH CARE EDUCATION/TRAINING PROGRAM

## 2023-01-30 RX ORDER — RIVAROXABAN 20 MG/1
TABLET, FILM COATED ORAL
COMMUNITY
Start: 2023-01-03 | End: 2023-01-30 | Stop reason: ALTCHOICE

## 2023-01-30 RX ORDER — ONDANSETRON 4 MG/1
TABLET, ORALLY DISINTEGRATING ORAL
COMMUNITY
Start: 2023-01-28

## 2023-01-30 RX ORDER — ASPIRIN 81 MG/1
81 TABLET ORAL DAILY
Qty: 90 TABLET | Refills: 3 | Status: SHIPPED | OUTPATIENT
Start: 2023-01-30 | End: 2024-01-25

## 2023-01-30 NOTE — PROGRESS NOTES
Juve Carter is a 42 y.o. female who presents for follow up of   Chief Complaint   Patient presents with    Follow-up     pulmonary embolism       The patient reports no new clinical symptoms or new complaints since last clinic evaluation. Pt reports having some chest pains but denies sob, pt reports both legs are swollen and there is pain in back of calf. States she can feel the clot when she shaves. No interval hospitalizations reported    No interval surgery or procedures reported    No reported new medication changes reported       Medications reviewed with the patient, and chart updated to reflect changes.

## 2023-01-30 NOTE — PROGRESS NOTES
[facility] [place] at [address] [facility] [address]  W: [phone] F: [phone]      Reason for Visit:   Cielo Wilhelm is a 42 y.o. female who is seen in consultation at the request of Brandon Rail NP for evaluation of extensive unprovoked bilateral PE. Hematology / Oncology Treatment History:     Hematological/Oncological Diagnosis: Bilateral unprovoked PE    Date of Diagnosis: 5/9/22    Treatment course: completed 6 mo of Xarelto      History of Present Illness:     42year old with medical history of anxiety, substance abuse, presented to ED on 5/9/22 with shortness of breath. Workup showed RV strain with bilateral extensive pulmonary emboli. Lower extremity doppler showed a questionable thrombus in the left popliteal vein. No clear provoking causes. Family history reviewed, no family history of blood clotting disorders. She has been taking xarelto without complication since May. She has mild complaints of chest discomfort, leg swelling bilaterally. Review of Systems: A complete review of systems was obtained, negative except as described above. Interval History:     01/30/23    The patient reports intermittent chest pain, severe anxiety, and bilateral leg swelling. D-dimer was low from 1/27/22. She complains of bilateral calf pain and non-specific intermittent chest discomfort. No discomfort at present. Past Medical History:   Diagnosis Date    Psychiatric disorder     depression    Psychiatric disorder     anxiety      Past Surgical History:   Procedure Laterality Date    HX ORTHOPAEDIC  2012    left shoulder      Social History     Tobacco Use    Smoking status: Former    Smokeless tobacco: Never   Substance Use Topics    Alcohol use: Not Currently      No family history on file.   Current Outpatient Medications   Medication Sig    gabapentin (NEURONTIN) 300 mg capsule TAKE 1 CAPSULE BY MOUTH TWICE DAILY . DO NOT EXCEED 600MG    sertraline (ZOLOFT) 100 mg tablet Take 200 mg by mouth daily. clonazePAM (KlonoPIN) 1 mg tablet Take 1 mg by mouth two (2) times a day. buprenorphine-naloxone 8-2 mg subl 3 Tablets by SubLINGual route daily. No current facility-administered medications for this visit. No Known Allergies         Physical Exam:   Visit Vitals  /61 (BP 1 Location: Left upper arm, BP Patient Position: Sitting)   Pulse 91   Temp 98.2 °F (36.8 °C) (Oral)   Resp 16   Ht 5' 2\" (1.575 m)   Wt 252 lb (114.3 kg)   LMP 12/05/2022 Comment: pt states she hasn't had a period in a month   BMI 46.09 kg/m²       ECOG PS: 0  General: alert, cooperative, no distress   Mental  status: normal mood, behavior, speech, dress, motor activity, and thought processes, able to follow commands   HENT: NCAT   Neck: no visualized mass   Resp: no respiratory distress   Neuro: no gross deficits   Skin: no discoloration or lesions of concern on visible areas   Psychiatric: normal affect, consistent with stated mood, no evidence of hallucinations           Results:     Lab Results   Component Value Date/Time    WBC 6.5 01/27/2023 02:07 PM    HGB 12.7 01/27/2023 02:07 PM    HCT 40.5 01/27/2023 02:07 PM    PLATELET 184 01/86/3155 02:07 PM    MCV 95.7 01/27/2023 02:07 PM    ABS. NEUTROPHILS 3.9 01/27/2023 02:07 PM     Lab Results   Component Value Date/Time    Sodium 140 01/27/2023 02:07 PM    Potassium 4.3 01/27/2023 02:07 PM    Chloride 107 01/27/2023 02:07 PM    CO2 29 01/27/2023 02:07 PM    Glucose 84 01/27/2023 02:07 PM    BUN 5 (L) 01/27/2023 02:07 PM    Creatinine 0.73 01/27/2023 02:07 PM    GFR est AA >60 05/11/2022 03:45 AM    GFR est non-AA >60 05/11/2022 03:45 AM    Calcium 9.2 01/27/2023 02:07 PM     Lab Results   Component Value Date/Time    Bilirubin, total 0.4 01/27/2023 02:07 PM    ALT (SGPT) 30 01/27/2023 02:07 PM    Alk.  phosphatase 68 01/27/2023 02:07 PM    Protein, total 7.1 01/27/2023 02:07 PM    Albumin 3.8 01/27/2023 02:07 PM    Globulin 3.3 01/27/2023 02:07 PM     CT Results (most recent):  Results from [place] encounter on 05/09/22    CTA CHEST W OR W WO CONT    Narrative  EXAM:  CTA CHEST W OR W WO CONT  INDICATION:  new onset SOB, elevated troponin, very elevated D-dimer, likely PE. Additional history:  COMPARISON: None. .  TECHNIQUE:  Precontrast  images were obtained to localize the volume for acquisition. Multislice helical CT arteriography was performed from the diaphragm to the  thoracic inlet during uneventful rapid bolus intravenous contrast  administration. Lung and soft tissue windows were generated. Coronal and  sagittal images were generated and 3D post processing consisting of coronal  maximum intensity images was performed. CT dose reduction was achieved through use of a standardized protocol tailored  for this examination and automatic exposure control for dose modulation. Mary Alejo FINDINGS:  CHEST:  Chest wall/thoracic inlet: Within normal limits. Thyroid: Within normal limits. Mediastinum/lara: Within normal limits. Heart/vessels: There are pulmonary emboli to all lobes. Lungs/Pleura: Opacity in the posterior costophrenic sulcus. .  INCIDENTALLY IMAGED ABDOMEN:  Small hiatal hernia  . MSK:  Within normal limits. .    Impression  1. There are pulmonary emboli to all lobes. 2. Opacity in the posterior costophrenic sulcus is nonspecific and may represent  infarct, atelectasis and/or airspace disease. .  Findings of this exam were discussed with Alana Haynes by Dr. Naomie Jade by  telephone at approximately, 5/9/2022 9:47 PM.  [id number]      Assessment and Recommendations:     # Unprovoked extensive pulmonary embolism    Single episode  Unprovoked event 5/2022  No clear increased familial historical risk   Completed 6 months of systemic anticoagulation with Rivaroxaban.      The decision about the duration of anticoagulation is not dependent on thrombophilia testing results but on the provoked/unprovoked nature of the VTE. The risk of second event in the next 10 years is around 40%. The greatest risk is in the first 6 months (around 10-15%). In the subsequent years, the risk is about 2-3% every year. It is difficult to subject a young woman to life long anticoagulation. ACCP guidelines for management of patients with VTE allows individualization of therapy choices. I had an extended risk benefit discussions with the patient. She vocalized understanding. Rivaroxaban, an oral factor Xa inhibitor, provides a simple, fixed-dose regimen for treating acute deep-vein thrombosis (DVT) and for continued treatment, without the need for laboratory monitoring. An open-label, randomized, event-driven, noninferiority study that compared oral rivaroxaban alone (15 mg twice daily for 3 weeks, followed by 20 mg once daily) with subcutaneous enoxaparin followed by a vitamin K antagonist (either warfarin or acenocoumarol) for 3, 6, or 12 months in patients with acute, symptomatic DVT. Rivaroxaban was found to be non-inferior to the comparator (lovenox followed by warfarin) with regards to the primary efficacy i.e incidence of recurrent DVT/PE. Episodes of major bleeding was also similar in both groups. ([facility] investigators, [facility] [id number])     After completion of 6 months of Xarelto, the patient may have some benefit from aspirin 81 mg daily. Based on the data below from the INSPIRE study, the patient would benefit from low dose aspirin daily to reduce the risk of recurrent VTE. Supporting data detailed below:     \"Aspirin for the prevention of recurrent venous thromboembolism: the INSPIRE collaboration\" Osmany Caceres et al.   Aspirin after anticoagulant treatment reduces the overall risk of recurrence by more than a third in a broad cross-section of patients with a first unprovoked VTE, without significantly increasing the risk of bleeding.    Of 1224 patients, 193 had recurrent VTE over 30.4 months' median follow-up. Aspirin reduced recurrent VTE (7.5%/yr versus 5.1%/yr; hazard ratio [HR], 0.68; 95% confidence interval [CI], 0.51-0.90; P=0.008), including both deep-vein thrombosis (HR, 0.66; 95% CI, 0.47-0.92; P=0.01) and pulmonary embolism (HR, 0.66; 95% CI, 0.41-1.06; P=0.08). Aspirin reduced major vascular events (8.7%/yr versus 5.7%/yr; HR, 0.66; 95% CI, 0.50-0.86; P=0.002). The major bleeding rate was low (0.4%/yr for placebo and 0.5%/yr for aspirin). After adjustment for treatment adherence, recurrent VTE was reduced by 42% (HR, 0.58; 95% CI, 0.40-0.85; P=0.005). Lab Results   Component Value Date/Time    D-dimer 0.32 01/27/2023 02:07 PM     Lab Results   Component Value Date/Time    Iron 105 01/27/2023 02:07 PM    TIBC 307 01/27/2023 02:07 PM    Iron % saturation 34 01/27/2023 02:07 PM    Ferritin 86 01/27/2023 02:07 PM     Lab Results   Component Value Date/Time    WBC 6.5 01/27/2023 02:07 PM    HGB 12.7 01/27/2023 02:07 PM    HCT 40.5 01/27/2023 02:07 PM    PLATELET 323 29/18/1853 02:07 PM    MCV 95.7 01/27/2023 02:07 PM     Iron studies, D-dimer, all normal.      - the patient has symptoms of leg swelling, will repeat lower extremity doppler. If normal, the patient can switch to aspirin monotherapy 81 mg. We again reviewed the benefits and risks of anticoagulation. # Normocytic anemia    Lab Results   Component Value Date/Time    WBC 6.5 01/27/2023 02:07 PM    HGB 12.7 01/27/2023 02:07 PM    HCT 40.5 01/27/2023 02:07 PM    PLATELET 126 93/46/0712 02:07 PM    MCV 95.7 01/27/2023 02:07 PM     - resolved. Plan for follow up as a PRN basis. Will check lower extremity doppler, if normal, she can switch to aspirin monotherapy.      Signed By: Kathrene Jeans, MD      Attending Medical Oncologist   [facility]

## 2023-02-02 LAB
HCV GENTYP SERPL NAA+PROBE: NORMAL
HCV RNA SERPL NAA+PROBE-ACNC: NORMAL IU/ML
HCV RNA SERPL NAA+PROBE-LOG IU: NORMAL LOG10 IU/ML
LABORATORY COMMENT REPORT: NORMAL

## 2023-02-23 DIAGNOSIS — M54.16 LUMBAR RADICULOPATHY: ICD-10-CM

## 2023-02-23 RX ORDER — GABAPENTIN 300 MG/1
CAPSULE ORAL
Qty: 90 CAPSULE | Refills: 0 | Status: SHIPPED | OUTPATIENT
Start: 2023-02-23

## 2023-02-23 NOTE — TELEPHONE ENCOUNTER
Last visit 07/21/2022 SANTA Turner   Next appointment 02/27/2023 & 03/01/2023 NP Esther Turner   Previous refill encounter(s)   01/09/2023 Gabapentin #90 (1bid)     [facility]    Program: Medication Refill  Intervention Detail: New Rx: 1, reason: Patient Preference  Time Spent (min): 5      Requested Prescriptions     Pending Prescriptions Disp Refills    gabapentin (NEURONTIN) 300 mg capsule 90 Capsule 0     Sig: TAKE 1 CAPSULE BY MOUTH TWICE DAILY .  DO NOT EXCEED 600MG

## 2023-04-03 DIAGNOSIS — M54.16 LUMBAR RADICULOPATHY: ICD-10-CM

## 2023-04-03 RX ORDER — GABAPENTIN 300 MG/1
CAPSULE ORAL
Qty: 180 CAPSULE | Refills: 0 | Status: ON HOLD | OUTPATIENT
Start: 2023-04-03

## 2023-04-21 ENCOUNTER — HOSPITAL ENCOUNTER (INPATIENT)
Age: 38
LOS: 11 days | Discharge: LEFT BEFORE DISCHARGE | DRG: 751 | End: 2023-05-02
Attending: STUDENT IN AN ORGANIZED HEALTH CARE EDUCATION/TRAINING PROGRAM | Admitting: PSYCHIATRY & NEUROLOGY
Payer: COMMERCIAL

## 2023-04-21 DIAGNOSIS — F23 ACUTE PSYCHOSIS (HCC): Primary | ICD-10-CM

## 2023-04-21 DIAGNOSIS — F23 BRIEF PSYCHOTIC DISORDER (HCC): ICD-10-CM

## 2023-04-21 DIAGNOSIS — F11.10 OPIOID USE DISORDER, MILD, ABUSE (HCC): ICD-10-CM

## 2023-04-21 DIAGNOSIS — F15.20 AMPHETAMINE USE DISORDER, MODERATE (HCC): ICD-10-CM

## 2023-04-21 DIAGNOSIS — F22 DELUSIONAL DISORDER (HCC): ICD-10-CM

## 2023-04-21 DIAGNOSIS — N30.00 ACUTE CYSTITIS WITHOUT HEMATURIA: ICD-10-CM

## 2023-04-21 PROBLEM — F29 UNSPECIFIED PSYCHOSIS NOT DUE TO A SUBSTANCE OR KNOWN PHYSIOLOGICAL CONDITION (HCC): Status: ACTIVE | Noted: 2023-04-21

## 2023-04-21 LAB
ALBUMIN SERPL-MCNC: 3.8 G/DL (ref 3.5–5)
ALBUMIN/GLOB SERPL: 1.1 (ref 1.1–2.2)
ALP SERPL-CCNC: 72 U/L (ref 45–117)
ALT SERPL-CCNC: 15 U/L (ref 12–78)
AMPHET UR QL SCN: POSITIVE
ANION GAP SERPL CALC-SCNC: 10 MMOL/L (ref 5–15)
APPEARANCE UR: ABNORMAL
AST SERPL-CCNC: 24 U/L (ref 15–37)
BACTERIA URNS QL MICRO: ABNORMAL /HPF
BARBITURATES UR QL SCN: NEGATIVE
BASOPHILS # BLD: 0.1 K/UL (ref 0–0.1)
BASOPHILS NFR BLD: 1 % (ref 0–1)
BENZODIAZ UR QL: POSITIVE
BILIRUB SERPL-MCNC: 0.4 MG/DL (ref 0.2–1)
BILIRUB UR QL: NEGATIVE
BUN SERPL-MCNC: 13 MG/DL (ref 6–20)
BUN/CREAT SERPL: 16 (ref 12–20)
CALCIUM SERPL-MCNC: 8.5 MG/DL (ref 8.5–10.1)
CANNABINOIDS UR QL SCN: NEGATIVE
CHLORIDE SERPL-SCNC: 103 MMOL/L (ref 97–108)
CO2 SERPL-SCNC: 26 MMOL/L (ref 21–32)
COCAINE UR QL SCN: NEGATIVE
COLOR UR: ABNORMAL
CREAT SERPL-MCNC: 0.83 MG/DL (ref 0.55–1.02)
DIFFERENTIAL METHOD BLD: NORMAL
DRUG SCRN COMMENT,DRGCM: ABNORMAL
EOSINOPHIL # BLD: 0.3 K/UL (ref 0–0.4)
EOSINOPHIL NFR BLD: 4 % (ref 0–7)
EPITH CASTS URNS QL MICRO: ABNORMAL /LPF
ERYTHROCYTE [DISTWIDTH] IN BLOOD BY AUTOMATED COUNT: 12.9 % (ref 11.5–14.5)
ETHANOL SERPL-MCNC: <10 MG/DL
FLUAV RNA SPEC QL NAA+PROBE: NOT DETECTED
FLUBV RNA SPEC QL NAA+PROBE: NOT DETECTED
GLOBULIN SER CALC-MCNC: 3.6 G/DL (ref 2–4)
GLUCOSE SERPL-MCNC: 94 MG/DL (ref 65–100)
GLUCOSE UR STRIP.AUTO-MCNC: NEGATIVE MG/DL
HCG UR QL: NEGATIVE
HCT VFR BLD AUTO: 38.1 % (ref 35–47)
HGB BLD-MCNC: 12.4 G/DL (ref 11.5–16)
HGB UR QL STRIP: NEGATIVE
IMM GRANULOCYTES # BLD AUTO: 0 K/UL (ref 0–0.04)
IMM GRANULOCYTES NFR BLD AUTO: 0 % (ref 0–0.5)
KETONES UR QL STRIP.AUTO: NEGATIVE MG/DL
LEUKOCYTE ESTERASE UR QL STRIP.AUTO: ABNORMAL
LYMPHOCYTES # BLD: 2.3 K/UL (ref 0.8–3.5)
LYMPHOCYTES NFR BLD: 32 % (ref 12–49)
MCH RBC QN AUTO: 30.5 PG (ref 26–34)
MCHC RBC AUTO-ENTMCNC: 32.5 G/DL (ref 30–36.5)
MCV RBC AUTO: 93.8 FL (ref 80–99)
METHADONE UR QL: NEGATIVE
MONOCYTES # BLD: 0.5 K/UL (ref 0–1)
MONOCYTES NFR BLD: 7 % (ref 5–13)
MUCOUS THREADS URNS QL MICRO: ABNORMAL /LPF
NEUTS SEG # BLD: 4 K/UL (ref 1.8–8)
NEUTS SEG NFR BLD: 56 % (ref 32–75)
NITRITE UR QL STRIP.AUTO: NEGATIVE
NRBC # BLD: 0 K/UL (ref 0–0.01)
NRBC BLD-RTO: 0 PER 100 WBC
OPIATES UR QL: NEGATIVE
PCP UR QL: NEGATIVE
PH UR STRIP: 5.5 (ref 5–8)
PLATELET # BLD AUTO: 319 K/UL (ref 150–400)
PMV BLD AUTO: 10.1 FL (ref 8.9–12.9)
POTASSIUM SERPL-SCNC: 3.7 MMOL/L (ref 3.5–5.1)
PROT SERPL-MCNC: 7.4 G/DL (ref 6.4–8.2)
PROT UR STRIP-MCNC: 30 MG/DL
RBC # BLD AUTO: 4.06 M/UL (ref 3.8–5.2)
RBC #/AREA URNS HPF: ABNORMAL /HPF (ref 0–5)
SARS-COV-2 RNA RESP QL NAA+PROBE: NOT DETECTED
SODIUM SERPL-SCNC: 139 MMOL/L (ref 136–145)
SP GR UR REFRACTOMETRY: 1.03 (ref 1–1.03)
UA: UC IF INDICATED,UAUC: ABNORMAL
UROBILINOGEN UR QL STRIP.AUTO: 1 EU/DL (ref 0.2–1)
WBC # BLD AUTO: 7.2 K/UL (ref 3.6–11)
WBC URNS QL MICRO: ABNORMAL /HPF (ref 0–4)

## 2023-04-21 PROCEDURE — 85025 COMPLETE CBC W/AUTO DIFF WBC: CPT

## 2023-04-21 PROCEDURE — 65270000029 HC RM PRIVATE

## 2023-04-21 PROCEDURE — 74011250637 HC RX REV CODE- 250/637: Performed by: NURSE PRACTITIONER

## 2023-04-21 PROCEDURE — 80053 COMPREHEN METABOLIC PANEL: CPT

## 2023-04-21 PROCEDURE — 81025 URINE PREGNANCY TEST: CPT

## 2023-04-21 PROCEDURE — 87086 URINE CULTURE/COLONY COUNT: CPT

## 2023-04-21 PROCEDURE — 82077 ASSAY SPEC XCP UR&BREATH IA: CPT

## 2023-04-21 PROCEDURE — 80307 DRUG TEST PRSMV CHEM ANLYZR: CPT

## 2023-04-21 PROCEDURE — 81001 URINALYSIS AUTO W/SCOPE: CPT

## 2023-04-21 PROCEDURE — 99285 EMERGENCY DEPT VISIT HI MDM: CPT

## 2023-04-21 PROCEDURE — 90791 PSYCH DIAGNOSTIC EVALUATION: CPT

## 2023-04-21 PROCEDURE — 36415 COLL VENOUS BLD VENIPUNCTURE: CPT

## 2023-04-21 PROCEDURE — 87636 SARSCOV2 & INF A&B AMP PRB: CPT

## 2023-04-21 RX ORDER — CLONAZEPAM 0.5 MG/1
1 TABLET ORAL
Status: COMPLETED | OUTPATIENT
Start: 2023-04-21 | End: 2023-04-21

## 2023-04-21 RX ORDER — CEPHALEXIN 500 MG/1
500 CAPSULE ORAL
Status: COMPLETED | OUTPATIENT
Start: 2023-04-21 | End: 2023-04-21

## 2023-04-21 RX ADMIN — CEPHALEXIN 500 MG: 500 CAPSULE ORAL at 19:57

## 2023-04-21 RX ADMIN — CLONAZEPAM 1 MG: 0.5 TABLET ORAL at 19:56
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
## 2023-04-21 NOTE — ED NOTES
Pt has been told by multiple staff that her  is not in the ER. The patient then requested to check the rooms to see if he was here. When patient told she was not able to do that patient requested to go check the waiting room . Pt reports the spy ware on her phone is telling her he's here. I explained to the patient, if the  is in the facility and wants to come back to see her all he has to do is ask. But patient is not able to seek the . Patient keeps attempting to leave her room to look for her . MD made aware.

## 2023-04-21 NOTE — BSMART NOTE
BSMART assessment completed, and suicide risk level noted to be high. Primary Nurse Gabriela David and Physician Dr. Naren De La Cruz notified. Concerns not observed. Security/Off- has not been notified.

## 2023-04-21 NOTE — BSMART NOTE
BSMART assessment completed, and suicide risk level noted to be high. Primary Nurse Polly and Physician Dr. Centeno notified. Concerns not observed.    Security/Off- has not been notified.

## 2023-04-21 NOTE — ED PROVIDER NOTES
[address] [facility] EMERGENCY DEPT  EMERGENCY DEPARTMENT ENCOUNTER       Pt Name: Angelika England  MRN: [id number]  [place] 1985  Date of evaluation: 4/21/2023  Provider: Arianna Bush NP   PCP: Cami Guerin NP  Note Started: 6:10 PM 4/21/23     CHIEF COMPLAINT       Chief Complaint   Patient presents with    Mental Health Problem        HISTORY OF PRESENT ILLNESS: 1 or more elements      History From: Patient  HPI Limitations : Mental Health Disorder     Angelika England is a 42 y.o. female who presents   By ambulance to the emergency department. Patient states she thinks someone is going to use rat poison to kill her and her . She states there is spyware in her phone. She states that she is hearing voices but has not hearing voices today. She states she has had thoughts of harming herself. She denies HI. Nursing Notes were all reviewed and agreed with or any disagreements were addressed in the HPI. REVIEW OF SYSTEMS      Review of Systems   Unable to perform ROS: Psychiatric disorder      Positives and Pertinent negatives as per HPI. PAST HISTORY     Past Medical History:  Past Medical History:   Diagnosis Date    Psychiatric disorder     depression    Psychiatric disorder     anxiety       Past Surgical History:  Past Surgical History:   Procedure Laterality Date    HX ORTHOPAEDIC  2012    left shoulder       Family History:  No family history on file. Social History:  Social History     Tobacco Use    Smoking status: Former    Smokeless tobacco: Never   Vaping Use    Vaping Use: Never used   Substance Use Topics    Alcohol use: Not Currently    Drug use: Not Currently       Allergies:  No Known Allergies    CURRENT MEDICATIONS      Previous Medications    ASPIRIN DELAYED-RELEASE 81 MG TABLET    Take 1 Tablet by mouth daily for 360 days. BUPRENORPHINE-NALOXONE 8-2 MG SUBL    3 Tablets by SubLINGual route daily.     CLONAZEPAM (KLONOPIN) 1 MG TABLET    Take 1 mg by mouth two (2) times a day.    GABAPENTIN (NEURONTIN) 300 MG CAPSULE    TAKE 1 CAPSULE BY MOUTH TWICE DAILY . DO NOT EXCEED 600MG    ONDANSETRON (ZOFRAN ODT) 4 MG DISINTEGRATING TABLET    DISSOLVE 1 TABLET IN MOUTH TWICE DAILY AS DIRECTED FOR NAUSEA    SERTRALINE (ZOLOFT) 100 MG TABLET    Take 200 mg by mouth daily. PHYSICAL EXAM      ED Triage Vitals [04/21/23 1511]   ED Encounter Vitals Group      /76      Pulse (Heart Rate) 86      Resp Rate 18      Temp 98 °F (36.7 °C)      Temp src       O2 Sat (%) 99 %      Weight 250 lb      Height 5' 2\"        Physical Exam  Vitals and nursing note reviewed. Constitutional:       General: She is not in acute distress. Appearance: Normal appearance. She is well-developed. HENT:      Head: Normocephalic and atraumatic. Right Ear: External ear normal.      Left Ear: External ear normal.      Nose: Nose normal.   Eyes:      Conjunctiva/sclera: Conjunctivae normal.   Cardiovascular:      Rate and Rhythm: Normal rate and regular rhythm. Heart sounds: Normal heart sounds. Pulmonary:      Effort: Pulmonary effort is normal. No respiratory distress. Breath sounds: Normal breath sounds. No wheezing. Abdominal:      General: Bowel sounds are normal.      Palpations: Abdomen is soft. Tenderness: There is no abdominal tenderness. Musculoskeletal:         General: Normal range of motion. Cervical back: Normal range of motion and neck supple. Lymphadenopathy:      Cervical: No cervical adenopathy. Skin:     General: Skin is warm and dry. Findings: No rash. Neurological:      Mental Status: She is alert and oriented to person, place, and time. Cranial Nerves: No cranial nerve deficit. Coordination: Coordination normal.   Psychiatric:         Attention and Perception: She is inattentive. Speech: Speech is delayed. Behavior: Behavior normal.         Thought Content: Thought content is paranoid.  Thought content includes suicidal ideation. Thought content does not include homicidal ideation. Thought content does not include suicidal plan. DIAGNOSTIC RESULTS   LABS:     Recent Results (from the past 12 hour(s))   CBC WITH AUTOMATED DIFF    Collection Time: 04/21/23  5:05 PM   Result Value Ref Range    WBC 7.2 3.6 - 11.0 K/uL    RBC 4.06 3.80 - 5.20 M/uL    HGB 12.4 11.5 - 16.0 g/dL    HCT 38.1 35.0 - 47.0 %    MCV 93.8 80.0 - 99.0 FL    MCH 30.5 26.0 - 34.0 PG    MCHC 32.5 30.0 - 36.5 g/dL    RDW 12.9 11.5 - 14.5 %    PLATELET 263 038 - 710 K/uL    MPV 10.1 8.9 - 12.9 FL    NRBC 0.0 0  WBC    ABSOLUTE NRBC 0.00 0.00 - 0.01 K/uL    NEUTROPHILS 56 32 - 75 %    LYMPHOCYTES 32 12 - 49 %    MONOCYTES 7 5 - 13 %    EOSINOPHILS 4 0 - 7 %    BASOPHILS 1 0 - 1 %    IMMATURE GRANULOCYTES 0 0.0 - 0.5 %    ABS. NEUTROPHILS 4.0 1.8 - 8.0 K/UL    ABS. LYMPHOCYTES 2.3 0.8 - 3.5 K/UL    ABS. MONOCYTES 0.5 0.0 - 1.0 K/UL    ABS. EOSINOPHILS 0.3 0.0 - 0.4 K/UL    ABS. BASOPHILS 0.1 0.0 - 0.1 K/UL    ABS. IMM. GRANS. 0.0 0.00 - 0.04 K/UL    DF AUTOMATED     METABOLIC PANEL, COMPREHENSIVE    Collection Time: 04/21/23  5:05 PM   Result Value Ref Range    Sodium 139 136 - 145 mmol/L    Potassium 3.7 3.5 - 5.1 mmol/L    Chloride 103 97 - 108 mmol/L    CO2 26 21 - 32 mmol/L    Anion gap 10 5 - 15 mmol/L    Glucose 94 65 - 100 mg/dL    BUN 13 6 - 20 MG/DL    Creatinine 0.83 0.55 - 1.02 MG/DL    BUN/Creatinine ratio 16 12 - 20      eGFR >60 >60 ml/min/1.73m2    Calcium 8.5 8.5 - 10.1 MG/DL    Bilirubin, total 0.4 0.2 - 1.0 MG/DL    ALT (SGPT) 15 12 - 78 U/L    AST (SGOT) 24 15 - 37 U/L    Alk.  phosphatase 72 45 - 117 U/L    Protein, total 7.4 6.4 - 8.2 g/dL    Albumin 3.8 3.5 - 5.0 g/dL    Globulin 3.6 2.0 - 4.0 g/dL    A-G Ratio 1.1 1.1 - 2.2     URINALYSIS W/ REFLEX CULTURE    Collection Time: 04/21/23  5:05 PM    Specimen: Urine   Result Value Ref Range    Color DARK YELLOW      Appearance TURBID (A) CLEAR      Specific gravity 1.030 1.003 - 1.030 pH (UA) 5.5 5.0 - 8.0      Protein 30 (A) NEG mg/dL    Glucose Negative NEG mg/dL    Ketone Negative NEG mg/dL    Bilirubin Negative NEG      Blood Negative NEG      Urobilinogen 1.0 0.2 - 1.0 EU/dL    Nitrites Negative NEG      Leukocyte Esterase SMALL (A) NEG      WBC 10-20 0 - 4 /hpf    RBC 5-10 0 - 5 /hpf    Epithelial cells MANY (A) FEW /lpf    Bacteria 2+ (A) NEG /hpf    UA:UC IF INDICATED URINE CULTURE ORDERED (A) CNI      Mucus 3+ (A) NEG /lpf   DRUG SCREEN, URINE    Collection Time: 04/21/23  5:05 PM   Result Value Ref Range    AMPHETAMINES Positive (A) NEG      BARBITURATES Negative NEG      BENZODIAZEPINES Positive (A) NEG      COCAINE Negative NEG      METHADONE Negative NEG      OPIATES Negative NEG      PCP(PHENCYCLIDINE) Negative NEG      THC (TH-CANNABINOL) Negative NEG      Drug screen comment (NOTE)    ETHYL ALCOHOL    Collection Time: 04/21/23  5:05 PM   Result Value Ref Range    ALCOHOL(ETHYL),SERUM <10 <10 MG/DL   COVID-19 WITH INFLUENZA A/B    Collection Time: 04/21/23  5:14 PM   Result Value Ref Range    SARS-CoV-2 by PCR Not detected NOTD      Influenza A by PCR Not detected      Influenza B by PCR Not detected     HCG URINE, QL. - POC    Collection Time: 04/21/23  7:39 PM   Result Value Ref Range    Pregnancy test,urine (POC) Negative NEG          EKG: When ordered, EKG's are interpreted by the Emergency Department Physician in the absence of a cardiologist.  Please see their note for interpretation of EKG. RADIOLOGY:  Non-plain film images such as CT, Ultrasound and MRI are read by the radiologist. Plain radiographic images are visualized and preliminarily interpreted by the ED Provider with the below findings:          Interpretation per the Radiologist below, if available at the time of this note:     No results found.       PROCEDURES   Unless otherwise noted below, none  Procedures     CRITICAL CARE TIME       EMERGENCY DEPARTMENT COURSE and DIFFERENTIAL DIAGNOSIS/MDM   Vitals: Vitals:    04/21/23 1511 04/21/23 1543   BP: 135/76    Pulse: 86    Resp: 18    Temp: 98 °F (36.7 °C)    SpO2: 99% 99%   Weight: 113.4 kg (250 lb)    Height: 5' 2\" (1.575 m)         Patient was given the following medications:  Medications   clonazePAM (KlonoPIN) tablet 1 mg (1 mg Oral Given 4/21/23 1956)   cephALEXin (KEFLEX) capsule 500 mg (500 mg Oral Given 4/21/23 1957)       CONSULTS: (Who and What was discussed)  None    Chronic Conditions: none    Social Determinants affecting Dx or Tx: None    Records Reviewed (source and summary of external records): Nursing notes    CC/HPI Summary, DDx, ED Course, and Reassessment: 70-year-old female presents by ambulance stating that she thinks someone is trying to use representatives poison herself and her  states that she occasionally is hearing voices states she is suicidal but does not have a plan . denies HI patient recently discharged from [facility]. Exam flight of ideas no eye contact depressed mood flat affect. We will order be smart consult labs per protocol differential diagnosis acute psychotic so schizoaffective disorder delusional disorder    ED Course as of 04/21/23 2153 Fri Apr 21, 2023 1933 Patient has been evalulated by Wanda Joaquin counselor. Patient will be admitted. Patient anxious and requesting clonazepam. [AN]      ED Course User Index  [AN] Bridget Balckmon NP       Disposition Considerations (Tests not done, Shared Decision Making, Pt Expectation of Test or Tx.):      FINAL IMPRESSION     1. Acute psychosis ([facility] [address]    2. Acute cystitis without hematuria          DISPOSITION/PLAN   Admitted    Patient will be admitted to [facility]  behavioral health ,accepting physician Dr. Jon Ohara      ED Attending Involvement : I have seen and evaluated the patient. My supervision physician was available for consultation. I am the Primary Clinician of Record.    Ezequiel Rendon NP (electronically signed)    (Please note that parts of this dictation were completed with voice recognition software. Quite often unanticipated grammatical, syntax, homophones, and other interpretive errors are inadvertently transcribed by the computer software. Please disregards these errors.  Please excuse any errors that have escaped final proofreading.)

## 2023-04-21 NOTE — ED PROVIDER NOTES
[facility] EMERGENCY DEPT  EMERGENCY DEPARTMENT ENCOUNTER       Pt Name: Ciara Bates  MRN: [id number]  Birthdate 1985  Date of evaluation: 4/21/2023  Provider: Ladonna Jean NP   PCP: Carmella Shaw NP  Note Started: 6:10 PM 4/21/23     CHIEF COMPLAINT       Chief Complaint   Patient presents with    Mental Health Problem        HISTORY OF PRESENT ILLNESS: 1 or more elements      History From: Patient  HPI Limitations : Mental Health Disorder     Ciara Bates is a 37 y.o. female who presents   By ambulance to the emergency department.  Patient states she thinks someone is going to use rat poison to kill her and her .  She states there is spyware in her phone.  She states that she is hearing voices but has not hearing voices today.  She states she has had thoughts of harming herself.  She denies HI.  Nursing Notes were all reviewed and agreed with or any disagreements were addressed in the HPI.     REVIEW OF SYSTEMS      Review of Systems   Unable to perform ROS: Psychiatric disorder      Positives and Pertinent negatives as per HPI.    PAST HISTORY     Past Medical History:  Past Medical History:   Diagnosis Date    Psychiatric disorder     depression    Psychiatric disorder     anxiety       Past Surgical History:  Past Surgical History:   Procedure Laterality Date    HX ORTHOPAEDIC  2012    left shoulder       Family History:  No family history on file.    Social History:  Social History     Tobacco Use    Smoking status: Former    Smokeless tobacco: Never   Vaping Use    Vaping Use: Never used   Substance Use Topics    Alcohol use: Not Currently    Drug use: Not Currently       Allergies:  No Known Allergies    CURRENT MEDICATIONS      Previous Medications    ASPIRIN DELAYED-RELEASE 81 MG TABLET    Take 1 Tablet by mouth daily for 360 days.    BUPRENORPHINE-NALOXONE 8-2 MG SUBL    3 Tablets by SubLINGual route daily.    CLONAZEPAM (KLONOPIN) 1 MG TABLET    Take 1 mg by mouth two (2) times a  day.    GABAPENTIN (NEURONTIN) 300 MG CAPSULE    TAKE 1 CAPSULE BY MOUTH TWICE DAILY . DO NOT EXCEED 600MG    ONDANSETRON (ZOFRAN ODT) 4 MG DISINTEGRATING TABLET    DISSOLVE 1 TABLET IN MOUTH TWICE DAILY AS DIRECTED FOR NAUSEA    SERTRALINE (ZOLOFT) 100 MG TABLET    Take 200 mg by mouth daily.       PHYSICAL EXAM      ED Triage Vitals [04/21/23 1511]   ED Encounter Vitals Group      /76      Pulse (Heart Rate) 86      Resp Rate 18      Temp 98 °F (36.7 °C)      Temp src       O2 Sat (%) 99 %      Weight 250 lb      Height 5' 2\"        Physical Exam  Vitals and nursing note reviewed.   Constitutional:       General: She is not in acute distress.     Appearance: Normal appearance. She is well-developed.   HENT:      Head: Normocephalic and atraumatic.      Right Ear: External ear normal.      Left Ear: External ear normal.      Nose: Nose normal.   Eyes:      Conjunctiva/sclera: Conjunctivae normal.   Cardiovascular:      Rate and Rhythm: Normal rate and regular rhythm.      Heart sounds: Normal heart sounds.   Pulmonary:      Effort: Pulmonary effort is normal. No respiratory distress.      Breath sounds: Normal breath sounds. No wheezing.   Abdominal:      General: Bowel sounds are normal.      Palpations: Abdomen is soft.      Tenderness: There is no abdominal tenderness.   Musculoskeletal:         General: Normal range of motion.      Cervical back: Normal range of motion and neck supple.   Lymphadenopathy:      Cervical: No cervical adenopathy.   Skin:     General: Skin is warm and dry.      Findings: No rash.   Neurological:      Mental Status: She is alert and oriented to person, place, and time.      Cranial Nerves: No cranial nerve deficit.      Coordination: Coordination normal.   Psychiatric:         Attention and Perception: She is inattentive.         Speech: Speech is delayed.         Behavior: Behavior normal.         Thought Content: Thought content is paranoid. Thought content includes suicidal  ideation. Thought content does not include homicidal ideation. Thought content does not include suicidal plan.        DIAGNOSTIC RESULTS   LABS:     Recent Results (from the past 12 hour(s))   CBC WITH AUTOMATED DIFF    Collection Time: 04/21/23  5:05 PM   Result Value Ref Range    WBC 7.2 3.6 - 11.0 K/uL    RBC 4.06 3.80 - 5.20 M/uL    HGB 12.4 11.5 - 16.0 g/dL    HCT 38.1 35.0 - 47.0 %    MCV 93.8 80.0 - 99.0 FL    MCH 30.5 26.0 - 34.0 PG    MCHC 32.5 30.0 - 36.5 g/dL    RDW 12.9 11.5 - 14.5 %    PLATELET 319 150 - 400 K/uL    MPV 10.1 8.9 - 12.9 FL    NRBC 0.0 0  WBC    ABSOLUTE NRBC 0.00 0.00 - 0.01 K/uL    NEUTROPHILS 56 32 - 75 %    LYMPHOCYTES 32 12 - 49 %    MONOCYTES 7 5 - 13 %    EOSINOPHILS 4 0 - 7 %    BASOPHILS 1 0 - 1 %    IMMATURE GRANULOCYTES 0 0.0 - 0.5 %    ABS. NEUTROPHILS 4.0 1.8 - 8.0 K/UL    ABS. LYMPHOCYTES 2.3 0.8 - 3.5 K/UL    ABS. MONOCYTES 0.5 0.0 - 1.0 K/UL    ABS. EOSINOPHILS 0.3 0.0 - 0.4 K/UL    ABS. BASOPHILS 0.1 0.0 - 0.1 K/UL    ABS. IMM. GRANS. 0.0 0.00 - 0.04 K/UL    DF AUTOMATED     METABOLIC PANEL, COMPREHENSIVE    Collection Time: 04/21/23  5:05 PM   Result Value Ref Range    Sodium 139 136 - 145 mmol/L    Potassium 3.7 3.5 - 5.1 mmol/L    Chloride 103 97 - 108 mmol/L    CO2 26 21 - 32 mmol/L    Anion gap 10 5 - 15 mmol/L    Glucose 94 65 - 100 mg/dL    BUN 13 6 - 20 MG/DL    Creatinine 0.83 0.55 - 1.02 MG/DL    BUN/Creatinine ratio 16 12 - 20      eGFR >60 >60 ml/min/1.73m2    Calcium 8.5 8.5 - 10.1 MG/DL    Bilirubin, total 0.4 0.2 - 1.0 MG/DL    ALT (SGPT) 15 12 - 78 U/L    AST (SGOT) 24 15 - 37 U/L    Alk. phosphatase 72 45 - 117 U/L    Protein, total 7.4 6.4 - 8.2 g/dL    Albumin 3.8 3.5 - 5.0 g/dL    Globulin 3.6 2.0 - 4.0 g/dL    A-G Ratio 1.1 1.1 - 2.2     URINALYSIS W/ REFLEX CULTURE    Collection Time: 04/21/23  5:05 PM    Specimen: Urine   Result Value Ref Range    Color DARK YELLOW      Appearance TURBID (A) CLEAR      Specific gravity 1.030 1.003 - 1.030       pH (UA) 5.5 5.0 - 8.0      Protein 30 (A) NEG mg/dL    Glucose Negative NEG mg/dL    Ketone Negative NEG mg/dL    Bilirubin Negative NEG      Blood Negative NEG      Urobilinogen 1.0 0.2 - 1.0 EU/dL    Nitrites Negative NEG      Leukocyte Esterase SMALL (A) NEG      WBC 10-20 0 - 4 /hpf    RBC 5-10 0 - 5 /hpf    Epithelial cells MANY (A) FEW /lpf    Bacteria 2+ (A) NEG /hpf    UA:UC IF INDICATED URINE CULTURE ORDERED (A) CNI      Mucus 3+ (A) NEG /lpf   DRUG SCREEN, URINE    Collection Time: 04/21/23  5:05 PM   Result Value Ref Range    AMPHETAMINES Positive (A) NEG      BARBITURATES Negative NEG      BENZODIAZEPINES Positive (A) NEG      COCAINE Negative NEG      METHADONE Negative NEG      OPIATES Negative NEG      PCP(PHENCYCLIDINE) Negative NEG      THC (TH-CANNABINOL) Negative NEG      Drug screen comment (NOTE)    ETHYL ALCOHOL    Collection Time: 04/21/23  5:05 PM   Result Value Ref Range    ALCOHOL(ETHYL),SERUM <10 <10 MG/DL   COVID-19 WITH INFLUENZA A/B    Collection Time: 04/21/23  5:14 PM   Result Value Ref Range    SARS-CoV-2 by PCR Not detected NOTD      Influenza A by PCR Not detected      Influenza B by PCR Not detected     HCG URINE, QL. - POC    Collection Time: 04/21/23  7:39 PM   Result Value Ref Range    Pregnancy test,urine (POC) Negative NEG          EKG: When ordered, EKG's are interpreted by the Emergency Department Physician in the absence of a cardiologist.  Please see their note for interpretation of EKG.      RADIOLOGY:  Non-plain film images such as CT, Ultrasound and MRI are read by the radiologist. Plain radiographic images are visualized and preliminarily interpreted by the ED Provider with the below findings:          Interpretation per the Radiologist below, if available at the time of this note:     No results found.      PROCEDURES   Unless otherwise noted below, none  Procedures     CRITICAL CARE TIME       EMERGENCY DEPARTMENT COURSE and DIFFERENTIAL DIAGNOSIS/MDM   Vitals:     Vitals:    04/21/23 1511 04/21/23 1543   BP: 135/76    Pulse: 86    Resp: 18    Temp: 98 °F (36.7 °C)    SpO2: 99% 99%   Weight: 113.4 kg (250 lb)    Height: 5' 2\" (1.575 m)         Patient was given the following medications:  Medications   clonazePAM (KlonoPIN) tablet 1 mg (1 mg Oral Given 4/21/23 1956)   cephALEXin (KEFLEX) capsule 500 mg (500 mg Oral Given 4/21/23 1957)       CONSULTS: (Who and What was discussed)  None    Chronic Conditions: none    Social Determinants affecting Dx or Tx: None    Records Reviewed (source and summary of external records): Nursing notes    CC/HPI Summary, DDx, ED Course, and Reassessment: 28-year-old female presents by ambulance stating that she thinks someone is trying to use representatives poison herself and her  states that she occasionally is hearing voices states she is suicidal but does not have a plan .denies HI patient recently discharged from [facility].  Exam flight of ideas no eye contact depressed mood flat affect.  We will order be smart consult labs per protocol differential diagnosis acute psychotic so schizoaffective disorder delusional disorder    ED Course as of 04/21/23 2153 Fri Apr 21, 2023 1933 Patient has been evalulated by Deborah Taylor [facility] counselor. Patient will be admitted. Patient anxious and requesting clonazepam. [AN]      ED Course User Index  [AN] Ladonna Jean NP       Disposition Considerations (Tests not done, Shared Decision Making, Pt Expectation of Test or Tx.):      FINAL IMPRESSION     1. Acute psychosis (HCC)    2. Acute cystitis without hematuria          DISPOSITION/PLAN   Admitted    Patient will be admitted to [facility] ,accepting physician Dr. Vuong     PATIENT REFER      ED Attending Involvement : I have seen and evaluated the patient. My supervision physician was available for consultation.     I am the Primary Clinician of Record.   Ladonna Jean NP (electronically  signed)    (Please note that parts of this dictation were completed with voice recognition software. Quite often unanticipated grammatical, syntax, homophones, and other interpretive errors are inadvertently transcribed by the computer software. Please disregards these errors. Please excuse any errors that have escaped final proofreading.)

## 2023-04-21 NOTE — ED TRIAGE NOTES
Pt presents ambulatory to ED complaining of mental health problem. .   Patient states \" me and my  both are being threatened to be shot. \" Patient also states that there is spy ware in her phone.
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
## 2023-04-22 LAB
BACTERIA SPEC CULT: NORMAL
SERVICE CMNT-IMP: NORMAL

## 2023-04-22 PROCEDURE — 74011250637 HC RX REV CODE- 250/637: Performed by: NURSE PRACTITIONER

## 2023-04-22 PROCEDURE — 65220000003 HC RM SEMIPRIVATE PSYCH

## 2023-04-22 RX ORDER — HYDROXYZINE 25 MG/1
50 TABLET, FILM COATED ORAL
Status: DISCONTINUED | OUTPATIENT
Start: 2023-04-22 | End: 2023-04-24

## 2023-04-22 RX ORDER — ACETAMINOPHEN 325 MG/1
650 TABLET ORAL
Status: DISCONTINUED | OUTPATIENT
Start: 2023-04-22 | End: 2023-05-02 | Stop reason: HOSPADM

## 2023-04-22 RX ORDER — SERTRALINE HYDROCHLORIDE 50 MG/1
200 TABLET, FILM COATED ORAL DAILY
Status: DISCONTINUED | OUTPATIENT
Start: 2023-04-23 | End: 2023-05-02 | Stop reason: HOSPADM

## 2023-04-22 RX ORDER — ONDANSETRON 4 MG/1
4 TABLET, ORALLY DISINTEGRATING ORAL
Status: DISCONTINUED | OUTPATIENT
Start: 2023-04-22 | End: 2023-05-02 | Stop reason: HOSPADM

## 2023-04-22 RX ORDER — LORAZEPAM 2 MG/ML
1 INJECTION INTRAMUSCULAR
Status: DISCONTINUED | OUTPATIENT
Start: 2023-04-22 | End: 2023-05-02 | Stop reason: HOSPADM

## 2023-04-22 RX ORDER — BENZTROPINE MESYLATE 1 MG/1
1 TABLET ORAL
Status: DISCONTINUED | OUTPATIENT
Start: 2023-04-22 | End: 2023-05-02 | Stop reason: HOSPADM

## 2023-04-22 RX ORDER — ZIPRASIDONE HYDROCHLORIDE 20 MG/1
20 CAPSULE ORAL 2 TIMES DAILY WITH MEALS
COMMUNITY
End: 2023-05-02

## 2023-04-22 RX ORDER — TRAZODONE HYDROCHLORIDE 50 MG/1
50 TABLET ORAL
Status: DISCONTINUED | OUTPATIENT
Start: 2023-04-22 | End: 2023-05-02 | Stop reason: HOSPADM

## 2023-04-22 RX ORDER — HYDROXYZINE PAMOATE 50 MG/1
50 CAPSULE ORAL
COMMUNITY
End: 2023-05-02

## 2023-04-22 RX ORDER — OLANZAPINE 5 MG/1
5 TABLET ORAL
Status: DISCONTINUED | OUTPATIENT
Start: 2023-04-22 | End: 2023-05-02 | Stop reason: HOSPADM

## 2023-04-22 RX ORDER — TRAZODONE HYDROCHLORIDE 50 MG/1
50 TABLET ORAL
COMMUNITY

## 2023-04-22 RX ORDER — HALOPERIDOL 5 MG/ML
5 INJECTION INTRAMUSCULAR
Status: DISCONTINUED | OUTPATIENT
Start: 2023-04-22 | End: 2023-05-02 | Stop reason: HOSPADM

## 2023-04-22 RX ORDER — ZIPRASIDONE HYDROCHLORIDE 20 MG/1
20 CAPSULE ORAL 2 TIMES DAILY WITH MEALS
Status: DISCONTINUED | OUTPATIENT
Start: 2023-04-23 | End: 2023-04-24

## 2023-04-22 RX ORDER — SERTRALINE HYDROCHLORIDE 200 MG/1
1 CAPSULE ORAL DAILY
COMMUNITY

## 2023-04-22 RX ORDER — BUPRENORPHINE HYDROCHLORIDE AND NALOXONE HYDROCHLORIDE DIHYDRATE 8; 2 MG/1; MG/1
3 TABLET SUBLINGUAL DAILY
Status: DISCONTINUED | OUTPATIENT
Start: 2023-04-23 | End: 2023-04-27

## 2023-04-22 RX ORDER — DIPHENHYDRAMINE HYDROCHLORIDE 50 MG/ML
50 INJECTION, SOLUTION INTRAMUSCULAR; INTRAVENOUS
Status: DISCONTINUED | OUTPATIENT
Start: 2023-04-22 | End: 2023-05-02 | Stop reason: HOSPADM

## 2023-04-22 RX ORDER — ADHESIVE BANDAGE
30 BANDAGE TOPICAL DAILY PRN
Status: DISCONTINUED | OUTPATIENT
Start: 2023-04-22 | End: 2023-05-02 | Stop reason: HOSPADM

## 2023-04-22 RX ORDER — ASPIRIN 81 MG/1
81 TABLET ORAL DAILY
Status: DISCONTINUED | OUTPATIENT
Start: 2023-04-23 | End: 2023-05-02 | Stop reason: HOSPADM

## 2023-04-22 RX ADMIN — ACETAMINOPHEN 650 MG: 325 TABLET ORAL at 20:35

## 2023-04-22 RX ADMIN — ACETAMINOPHEN 650 MG: 325 TABLET ORAL at 12:54

## 2023-04-22 RX ADMIN — HYDROXYZINE HYDROCHLORIDE 50 MG: 25 TABLET, FILM COATED ORAL at 09:09

## 2023-04-22 RX ADMIN — HYDROXYZINE HYDROCHLORIDE 50 MG: 25 TABLET, FILM COATED ORAL at 20:35

## 2023-04-22 RX ADMIN — OLANZAPINE 5 MG: 5 TABLET, FILM COATED ORAL at 20:35

## 2023-04-22 RX ADMIN — HYDROXYZINE HYDROCHLORIDE 50 MG: 25 TABLET, FILM COATED ORAL at 14:30

## 2023-04-22 NOTE — ED NOTES
TRANSFER - OUT REPORT:    Verbal report given to Mallory HERNANDEZ(name) on Ciara Bates  being transferred to [facility] (unit) for routine progression of care       Report consisted of patient’s Situation, Background, Assessment and   Recommendations(SBAR).     Information from the following report(s) SBAR, Kardex and ED Summary was reviewed with the receiving nurse.    Lines:       Opportunity for questions and clarification was provided.      Patient transported with:   security and belongings
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
## 2023-04-22 NOTE — PROGRESS NOTES
[facility]: MED RECONCILIATION    Comments/Recommendations:   Med rec performed via interview with patient, call to [facility] to verify suboxone and [facility].   [facility], patient receives suboxone 8-2 mg 3 SL tablets daily. Patient last dosed yesterday per Brooklyn Flowers LPN.   Patient reports no longer being on rivaroxaban and is now only on Aspirin 81 mg daily per her provider.     Medications added:     Ziprasidone  Trazodone  Hydroxyzine     Allergies: Patient has no known allergies.    Prior to Admission Medications:     Prior to Admission Medications   Prescriptions Last Dose Informant Patient Reported? Taking?   aspirin delayed-release 81 mg tablet   No Yes   Sig: Take 1 Tablet by mouth daily for 360 days.   buprenorphine-naloxone 8-2 mg subl  Other Yes No   Sig: 3 Tablets by SubLINGual route daily.   clonazePAM (KlonoPIN) 1 mg tablet  Self Yes Yes   Sig: Take 1 Tablet by mouth two (2) times a day.   gabapentin (NEURONTIN) 300 mg capsule   No Yes   Sig: TAKE 1 CAPSULE BY MOUTH TWICE DAILY . DO NOT EXCEED 600MG   hydrOXYzine pamoate (VISTARIL) 50 mg capsule   Yes Yes   Sig: Take 1 Capsule by mouth every four (4) hours as needed for Anxiety.   ondansetron (ZOFRAN ODT) 4 mg disintegrating tablet   Yes Yes   Sig: DISSOLVE 1 TABLET IN MOUTH TWICE DAILY AS DIRECTED FOR NAUSEA   sertraline 200 mg cap   Yes Yes   Sig: Take 1 Capsule by mouth daily.   traZODone (DESYREL) 50 mg tablet   Yes Yes   Sig: Take 1 Tablet by mouth nightly.   ziprasidone (Geodon) 20 mg capsule   Yes Yes   Sig: Take 1 Capsule by mouth two (2) times daily (with meals).      Facility-Administered Medications: None         Giselle Espinosa PHARMD   Contact: [phone]

## 2023-04-22 NOTE — PROGRESS NOTES
Problem: Suicide  Goal: *STG: Remains safe in hospital  Outcome: Progressing Towards Goal  Goal: *STG: Seeks staff when feelings of self harm or harm towards others arise  Outcome: Progressing Towards Goal     Problem: Falls - Risk of  Goal: *Absence of Falls  Description: Document Sandrita Fall Risk and appropriate interventions in the flowsheet.   Outcome: Progressing Towards Goal  Note: Fall Risk Interventions:            Medication Interventions: Teach patient to arise slowly                   Problem: Depressed Mood (Adult/Pediatric)  Goal: *STG: Participates in treatment plan  Outcome: Progressing Towards Goal  Goal: *STG: Remains safe in hospital  Outcome: Progressing Towards Goal  Goal: *STG: Complies with medication therapy  Outcome: Progressing Towards Goal     Problem: Depressed Mood (Adult/Pediatric)  Goal: *STG: Participates in treatment plan  Outcome: Progressing Towards Goal  Goal: *STG: Remains safe in hospital  Outcome: Progressing Towards Goal  Goal: *STG: Complies with medication therapy  Outcome: Progressing Towards Goal

## 2023-04-22 NOTE — PROGRESS NOTES
[facility]: MED RECONCILIATION    Comments/Recommendations:   Med rec performed via interview with patient, call to [address] to verify suboxone and [employer]. Per [address], patient receives suboxone 8-2 mg 3 SL tablets daily. Patient last dosed yesterday per Dick Torres LPN. Patient reports no longer being on rivaroxaban and is now only on Aspirin 81 mg daily per her provider. Medications added:     Ziprasidone  Trazodone  Hydroxyzine     Allergies: Patient has no known allergies. Prior to Admission Medications:     Prior to Admission Medications   Prescriptions Last Dose Informant Patient Reported? Taking?   aspirin delayed-release 81 mg tablet   No Yes   Sig: Take 1 Tablet by mouth daily for 360 days. buprenorphine-naloxone 8-2 mg subl  Other Yes No   Sig: 3 Tablets by SubLINGual route daily. clonazePAM (KlonoPIN) 1 mg tablet  Self Yes Yes   Sig: Take 1 Tablet by mouth two (2) times a day.   gabapentin (NEURONTIN) 300 mg capsule   No Yes   Sig: TAKE 1 CAPSULE BY MOUTH TWICE DAILY . DO NOT EXCEED 600MG   hydrOXYzine pamoate (VISTARIL) 50 mg capsule   Yes Yes   Sig: Take 1 Capsule by mouth every four (4) hours as needed for Anxiety. ondansetron (ZOFRAN ODT) 4 mg disintegrating tablet   Yes Yes   Sig: DISSOLVE 1 TABLET IN MOUTH TWICE DAILY AS DIRECTED FOR NAUSEA   sertraline 200 mg cap   Yes Yes   Sig: Take 1 Capsule by mouth daily. traZODone (DESYREL) 50 mg tablet   Yes Yes   Sig: Take 1 Tablet by mouth nightly. ziprasidone (Geodon) 20 mg capsule   Yes Yes   Sig: Take 1 Capsule by mouth two (2) times daily (with meals).       Facility-Administered Medications: None         TATI Baker   Contact: [phone]

## 2023-04-22 NOTE — ED NOTES
Emergency Department Nursing Plan of Care      The Nursing Plan of Care is developed from the Nursing assessment and Emergency Department Attending provider initial evaluation. The plan of care may be reviewed in the \"ED Provider note\".     The Plan of Care was developed with the following considerations:   Patient / Family readiness to learn indicated by: verbalized understanding  Persons(s) to be included in education: pt  Barriers to Learning/Limitations:cognitive    Signed    Beatriz Hamilton, RN
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
## 2023-04-22 NOTE — ED NOTES
Emergency Department Nursing Plan of Care      The Nursing Plan of Care is developed from the Nursing assessment and Emergency Department Attending provider initial evaluation.  The plan of care may be reviewed in the \"ED Provider note\".    The Plan of Care was developed with the following considerations:   Patient / Family readiness to learn indicated by: verbalized understanding  Persons(s) to be included in education: pt  Barriers to Learning/Limitations:cognitive    Signed    Nina Das RN

## 2023-04-22 NOTE — ED NOTES
TRANSFER - OUT REPORT:    Verbal report given to Mallory RN(name) on [facility]  being transferred to [facility] (unit) for routine progression of care       Report consisted of patients Situation, Background, Assessment and   Recommendations(SBAR). Information from the following report(s) SBAR, Kardex and ED Summary was reviewed with the receiving nurse. Lines:       Opportunity for questions and clarification was provided.       Patient transported with:   security and belongings

## 2023-04-23 PROCEDURE — 74011250637 HC RX REV CODE- 250/637: Performed by: NURSE PRACTITIONER

## 2023-04-23 PROCEDURE — 74011250637 HC RX REV CODE- 250/637: Performed by: PSYCHIATRY & NEUROLOGY

## 2023-04-23 PROCEDURE — 74011250636 HC RX REV CODE- 250/636: Performed by: PSYCHIATRY & NEUROLOGY

## 2023-04-23 PROCEDURE — 65220000003 HC RM SEMIPRIVATE PSYCH

## 2023-04-23 RX ORDER — NALOXONE HYDROCHLORIDE 0.4 MG/ML
0.4 INJECTION, SOLUTION INTRAMUSCULAR; INTRAVENOUS; SUBCUTANEOUS
Status: DISCONTINUED | OUTPATIENT
Start: 2023-04-23 | End: 2023-05-02 | Stop reason: HOSPADM

## 2023-04-23 RX ADMIN — TRAZODONE HYDROCHLORIDE 50 MG: 50 TABLET ORAL at 21:26

## 2023-04-23 RX ADMIN — HYDROXYZINE HYDROCHLORIDE 50 MG: 25 TABLET, FILM COATED ORAL at 09:09

## 2023-04-23 RX ADMIN — OLANZAPINE 5 MG: 5 TABLET, FILM COATED ORAL at 09:09

## 2023-04-23 RX ADMIN — SERTRALINE HYDROCHLORIDE 200 MG: 50 TABLET ORAL at 08:50

## 2023-04-23 RX ADMIN — ZIPRASIDONE HYDROCHLORIDE 20 MG: 20 CAPSULE ORAL at 08:50

## 2023-04-23 RX ADMIN — ASPIRIN 81 MG: 81 TABLET, COATED ORAL at 08:50

## 2023-04-23 RX ADMIN — BUPRENORPHINE HYDROCHLORIDE AND NALOXONE HYDROCHLORIDE DIHYDRATE 3 TABLET: 8; 2 TABLET SUBLINGUAL at 08:50

## 2023-04-23 RX ADMIN — HYDROXYZINE HYDROCHLORIDE 50 MG: 25 TABLET, FILM COATED ORAL at 23:22

## 2023-04-23 RX ADMIN — ZIPRASIDONE HYDROCHLORIDE 20 MG: 20 CAPSULE ORAL at 16:16

## 2023-04-23 RX ADMIN — ACETAMINOPHEN 650 MG: 325 TABLET ORAL at 12:16

## 2023-04-23 RX ADMIN — HYDROXYZINE HYDROCHLORIDE 50 MG: 25 TABLET, FILM COATED ORAL at 16:16
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
## 2023-04-23 NOTE — PROGRESS NOTES
Pt visible in the milieu,  appear calm and cooperative, interactive with staff and peers with  dull affect and anxious mood. Pt not in any obvious  pysch or medical destres. On assessment, endorse anxiety at 9/10, depression at 10/10 , denied SI, HI, and AVH. PRN tylenol, atarax, and atarax administered. Med's and meals compliant. No behaviors noted. Vital signs entered. Rounding in progress. Pt slept for total of 6:30 hours. Shift uneventful.

## 2023-04-23 NOTE — PROGRESS NOTES
Assumed care of patient after receiving report from night shift nurse. No apparent distress. Pt currently denies HI/AVH and pain, endorses passive SI w/no plan. Pt currently endorses depression and anxiety, agitation. Pt is food-focused and constantly asking for snacks and juice. Pt has been informed several times by numerous staff members of the meal and snack schedule. Pt seems to forget things she is told quickly and easily. Mood is anxious with flat affect. Med and meal compliant, tolerating medications well. Pt ambulates independently. Interacts appropriately with staff and peers. No behavioral issues observed. Q15 minute safety checks continued by staff.    6513: PRN Medication Documentation  Specific patient behavior that led to need for PRN medication: pt request medication for anxiety, AH, and agitation. Staff interventions attempted prior to PRN being given: Therapeutic listening/informing, discussed coping skills  PRN medication given: Atarax 50 mg PO and Zyprexa 5 mg PO  Patient response/effectiveness of PRN medication: 1100: pt sitting quietly in dayroom, working on word search.      1215: PRN Medication Documentation  Specific patient behavior that led to need for PRN medication: pt request pain medication for head and ankle  Staff interventions attempted prior to PRN being given: Medication education, promote rest/relaxation  PRN medication given: Tylenol 650 mg PO  Patient response/effectiveness of PRN medication:  1330: pt lying in bed with eyes closed    1615: PRN Medication Documentation  Specific patient behavior that led to need for PRN medication: pt request medication for increased anxiety  Staff interventions attempted prior to PRN being given: Encouraged pt to utilize coping skills  PRN medication given: Atarax 50 mg PO  Patient response/effectiveness of PRN medication:  1786: pt sitting in dayroom, eating dinner          Problem: Falls - Risk of  Goal: *Absence of Falls  Description: Document Emily Valenteing Fall Risk and appropriate interventions in the flowsheet.   Outcome: Progressing Towards Goal  Note: Fall Risk Interventions:       Mentation Interventions: Reorient patient    Medication Interventions: Teach patient to arise slowly    Problem: Depressed Mood (Adult/Pediatric)  Goal: *STG: Complies with medication therapy  Outcome: Progressing Towards Goal

## 2023-04-23 NOTE — PROGRESS NOTES
Problem: Suicide  Goal: *STG: Remains safe in hospital  Outcome: Progressing Towards Goal  Goal: *STG/LTG: Complies with medication therapy  Outcome: Progressing Towards Goal  Goal: *LTG:  Identifies available community resources  Outcome: Progressing Towards Goal

## 2023-04-23 NOTE — GROUP NOTE
MYNOR  GROUP DOCUMENTATION INDIVIDUAL                                                                          Group Therapy Note    Date: 4/23/2023    Group Start Time: 0845  Group End Time: 0908  Group Topic: Community Meeting    [address] [facility] 3 ACUTE BEHAV [facility]    Vale Osborne     [address] GROUP DOCUMENTATION GROUP    Group Therapy Note    Attendees: 7         Attendance: {[facility] GROUP DOC ATTENDANCE:89308}    Patient's Goal:  ***    Interventions/techniques: {[facility] GROUP DOC INTERVENTIONS/TECHNIQUES:40567}    Follows Directions: {[facility] GROUP DOC FOLLOWS DIRECTION:15849}    Interactions: {[facility] GROUP DOC INTERACTIONS:95325}    Mental Status: {[facility] GROUP DOC MENTAL STATUS:30947}    Behavior/appearance: {[facility] GROUP DOC BEHAVIOR/APPEARANCE:19663}    Goals Achieved: {[facility] GROUP DOC GOALS ACHIEVED:42366}      Additional Notes:  ***    Vale Lozano

## 2023-04-23 NOTE — GROUP NOTE
MYNOR  GROUP DOCUMENTATION INDIVIDUAL                                                                          Group Therapy Note    Date: 4/23/2023    Group Start Time: 0845  Group End Time: 0906  Group Topic: Community Meeting    [facility] 3 ACUTE BEHAV Vale Contreras  GROUP DOCUMENTATION GROUP    Group Therapy Note    Attendees: 7       Attendance: Attended    Patient's Goal:  To get out of her head    Interventions/techniques: Informed    Follows Directions: Followed directions    Interactions: Interacted appropriately    Mental Status: Calm and Depressed    Behavior/appearance: Attentive and Negative    Goals Achieved: Able to listen to others, Able to reflect/comment on own behavior, and Identified feelings      Additional Notes:  Patient says she's \"feeling suicidal but trying to push through it\"    Vale Perez

## 2023-04-23 NOTE — GROUP NOTE
MYNOR  GROUP DOCUMENTATION INDIVIDUAL                                                                          Group Therapy Note    Date: 4/23/2023    Group Start Time: 0845  Group End Time: 9879  Group Topic: Community Meeting    [facility] - [address] [facility] GROUP DOCUMENTATION GROUP    Group Therapy Note    Attendees: 7       Attendance: Attended    Patient's Goal:  To get out of her head    Interventions/techniques: Informed    Follows Directions:  Followed directions    Interactions: Interacted appropriately    Mental Status: Calm and Depressed    Behavior/appearance: Attentive and Negative    Goals Achieved: Able to listen to others, Able to reflect/comment on own behavior, and Identified feelings      Additional Notes:  Patient says she's \"feeling suicidal but trying to push through it\"    Brittani Lambert

## 2023-04-23 NOTE — H&P
[address] [facility] HISTORY AND PHYSICAL    Name:  Jignesh Jones  MR#:  [id number]  :  1985  ACCOUNT #:  [de-identified]  ADMIT DATE:  2023    PSYCHIATRIC INTAKE NOTE    CHIEF COMPLAINT:  \"Feel better. \"    HISTORY OF PRESENT ILLNESS:  This is a 38-year-old female, , 3 adult children, brought to the hospital by EMS due to believing that she and her  were going to be killed by rat poison. She says there is spyware in her phone, hearing voices at times. Says she has thoughts of harming herself at the same time. Today per ED report and on interview, she stated that she might have been paranoid and not making sense. She has a history of opiate abuse/addiction. Notes using some methamphetamines a few days ago prior to admission as well as some opiates. She describes the substances of abuse as well including benzodiazepines, pain pills, cocaine, methamphetamines specifically. Her notes indicate she is presently on Suboxone, followed at [facility] in [facility]. She states she has had suicidal ideations for a long time. Tells that her   the night before, she had a vision that he was shot and she became very distraught about this. No one is sure of what she is saying. She is here for management of her condition. PAST PSYCHIATRIC HISTORY:  Depression and anxiety, polysubstance dependence, prior treatments and detoxes. PAST MEDICAL HISTORY:  Blood clots. ALLERGIES:  NONE KNOWN DRUG ALLERGIES. SOCIAL HISTORY:  , 2 daughters, unemployed. History of opiates and methamphetamine abuse. Denies any cigarettes. MENTAL STATUS EXAM:  An adult female, calm and cooperative. Clear and coherent speech of average rate, volume, and tone. Mood is depressed. Affect withdrawn. Thoughts are linear and goal directed. Denies suicidal or homicidal ideations at this time and no auditory or visual hallucinations at this time.   Aware of her surroundings, location, and situation. Here for management of her condition. 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 PLAN:  Admit for safety and stabilization, medication modification as needed, group therapy, individual therapy. ESTIMATED LENGTH OF STAY:  Five to seven days. DISPOSITION:  Planning with Social Work. STRENGTHS:  Willingness for treatment. DISABILITIES:  Addiction. HUGH Aleman MD      [id number]  D:  04/23/2023 1:28  T:  04/23/2023 12:16  JOB #:  [id number]

## 2023-04-23 NOTE — GROUP NOTE
MYNOR  GROUP DOCUMENTATION INDIVIDUAL                                                                          Group Therapy Note    Date: 4/23/2023    Group Start Time: 0845  Group End Time: 0908  Group Topic: Community Meeting    [facility] 3 ACUTE BEHAV Vale Contreras  GROUP DOCUMENTATION GROUP    Group Therapy Note    Attendees: 7         Attendance: {[facility] GROUP DOC ATTENDANCE:47551}    Patient's Goal:  ***    Interventions/techniques: {[facility] GROUP DOC INTERVENTIONS/TECHNIQUES:77326}    Follows Directions: {[facility] GROUP DOC FOLLOWS DIRECTION:28811}    Interactions: {[facility] GROUP DOC INTERACTIONS:82484}    Mental Status: {[facility] GROUP DOC MENTAL STATUS:55054}    Behavior/appearance: {[facility] GROUP DOC BEHAVIOR/APPEARANCE:85795}    Goals Achieved: {[facility] GROUP DOC GOALS ACHIEVED:49369}      Additional Notes:  ***    Vale Perez

## 2023-04-23 NOTE — PROGRESS NOTES
Assumed care of patient after receiving report from night shift nurse. No apparent distress. Pt currently denies HI/AVH and pain, endorses passive SI w/no plan. Pt currently endorses depression and anxiety, agitation.  Pt is food-focused and constantly asking for snacks and juice. Pt has been informed several times by numerous staff members of the meal and snack schedule. Pt seems to forget things she is told quickly and easily.  Mood is anxious with flat affect. Med and meal compliant, tolerating medications well. Pt ambulates independently. Interacts appropriately with staff and peers. No behavioral issues observed. Q15 minute safety checks continued by staff.    0905: PRN Medication Documentation  Specific patient behavior that led to need for PRN medication: pt request medication for anxiety, AH, and agitation.  Staff interventions attempted prior to PRN being given: Therapeutic listening/informing, discussed coping skills  PRN medication given: Atarax 50 mg PO and Zyprexa 5 mg PO  Patient response/effectiveness of PRN medication: 1100: pt sitting quietly in dayroom, working on word search.     1215: PRN Medication Documentation  Specific patient behavior that led to need for PRN medication: pt request pain medication for head and ankle  Staff interventions attempted prior to PRN being given: Medication education, promote rest/relaxation  PRN medication given: Tylenol 650 mg PO  Patient response/effectiveness of PRN medication:  1330: pt lying in bed with eyes closed    1615: PRN Medication Documentation  Specific patient behavior that led to need for PRN medication: pt request medication for increased anxiety  Staff interventions attempted prior to PRN being given: Encouraged pt to utilize coping skills  PRN medication given: Atarax 50 mg PO  Patient response/effectiveness of PRN medication:  1715: pt sitting in dayroom, eating dinner          Problem: Falls - Risk of  Goal: *Absence of Falls  Description:  Document Sandrita Fall Risk and appropriate interventions in the flowsheet.  Outcome: Progressing Towards Goal  Note: Fall Risk Interventions:       Mentation Interventions: Reorient patient    Medication Interventions: Teach patient to arise slowly    Problem: Depressed Mood (Adult/Pediatric)  Goal: *STG: Complies with medication therapy  Outcome: Progressing Towards Goal

## 2023-04-23 NOTE — PROGRESS NOTES
Behavioral Services  Medicare Certification Upon Admission    I certify that this patient's inpatient psychiatric hospital admission is medically necessary for:    [x] (1) Treatment which could reasonably be expected to improve this patient's condition,       [x] (2) Or for diagnostic study;     AND     [x](2) The inpatient psychiatric services are provided while the individual is under the care of a physician and are included in the individualized plan of care.    Estimated length of stay/service 5 - 7 days    Plan for post-hospital care per social work    Electronically signed by Roverto Vuong MD on 4/22/2023 at 9:21 PM

## 2023-04-23 NOTE — PROGRESS NOTES
Behavioral Services  Medicare Certification Upon Admission    I certify that this patient's inpatient psychiatric hospital admission is medically necessary for:    [x] (1) Treatment which could reasonably be expected to improve this patient's condition,       [x] (2) Or for diagnostic study;     AND     [x](2) The inpatient psychiatric services are provided while the individual is under the care of a physician and are included in the individualized plan of care.     Estimated length of stay/service 5 - 7 days    Plan for post-hospital care per social work    Electronically signed by Josette Hoffman MD on 4/22/2023 at 9:21 PM

## 2023-04-24 PROBLEM — F11.10 OPIOID USE DISORDER, MILD, ABUSE (HCC): Status: ACTIVE | Noted: 2023-04-24

## 2023-04-24 PROBLEM — F29 PSYCHOSIS (HCC): Status: ACTIVE | Noted: 2023-04-21

## 2023-04-24 PROBLEM — F15.20 AMPHETAMINE USE DISORDER, MODERATE (HCC): Status: ACTIVE | Noted: 2023-04-24

## 2023-04-24 PROCEDURE — U0005 INFEC AGEN DETEC AMPLI PROBE: HCPCS

## 2023-04-24 PROCEDURE — 74011250637 HC RX REV CODE- 250/637: Performed by: NURSE PRACTITIONER

## 2023-04-24 PROCEDURE — 74011250637 HC RX REV CODE- 250/637: Performed by: PSYCHIATRY & NEUROLOGY

## 2023-04-24 PROCEDURE — 99232 SBSQ HOSP IP/OBS MODERATE 35: CPT | Performed by: PSYCHIATRY & NEUROLOGY

## 2023-04-24 PROCEDURE — 74011250636 HC RX REV CODE- 250/636: Performed by: PSYCHIATRY & NEUROLOGY

## 2023-04-24 PROCEDURE — 65220000003 HC RM SEMIPRIVATE PSYCH

## 2023-04-24 RX ORDER — GABAPENTIN 100 MG/1
100 CAPSULE ORAL
Status: DISCONTINUED | OUTPATIENT
Start: 2023-04-24 | End: 2023-04-25

## 2023-04-24 RX ORDER — ARIPIPRAZOLE 15 MG/1
15 TABLET ORAL DAILY
Status: DISCONTINUED | OUTPATIENT
Start: 2023-04-24 | End: 2023-04-26

## 2023-04-24 RX ORDER — GABAPENTIN 300 MG/1
300 CAPSULE ORAL 2 TIMES DAILY
Status: DISCONTINUED | OUTPATIENT
Start: 2023-04-24 | End: 2023-04-25

## 2023-04-24 RX ADMIN — ARIPIPRAZOLE 15 MG: 15 TABLET ORAL at 10:29

## 2023-04-24 RX ADMIN — GABAPENTIN 100 MG: 100 CAPSULE ORAL at 12:30

## 2023-04-24 RX ADMIN — OLANZAPINE 5 MG: 5 TABLET, FILM COATED ORAL at 09:27

## 2023-04-24 RX ADMIN — GABAPENTIN 300 MG: 300 CAPSULE ORAL at 16:55

## 2023-04-24 RX ADMIN — SERTRALINE HYDROCHLORIDE 200 MG: 50 TABLET ORAL at 11:46

## 2023-04-24 RX ADMIN — GABAPENTIN 100 MG: 100 CAPSULE ORAL at 21:24

## 2023-04-24 RX ADMIN — BUPRENORPHINE HYDROCHLORIDE AND NALOXONE HYDROCHLORIDE DIHYDRATE 3 TABLET: 8; 2 TABLET SUBLINGUAL at 08:31

## 2023-04-24 RX ADMIN — ACETAMINOPHEN 650 MG: 325 TABLET ORAL at 10:29

## 2023-04-24 RX ADMIN — ACETAMINOPHEN 650 MG: 325 TABLET ORAL at 21:24

## 2023-04-24 RX ADMIN — TRAZODONE HYDROCHLORIDE 50 MG: 50 TABLET ORAL at 21:24

## 2023-04-24 RX ADMIN — HYDROXYZINE HYDROCHLORIDE 50 MG: 25 TABLET, FILM COATED ORAL at 09:18

## 2023-04-24 RX ADMIN — ASPIRIN 81 MG: 81 TABLET, COATED ORAL at 08:30

## 2023-04-24 RX ADMIN — ZIPRASIDONE HYDROCHLORIDE 20 MG: 20 CAPSULE ORAL at 08:30

## 2023-04-24 RX ADMIN — GABAPENTIN 300 MG: 300 CAPSULE ORAL at 10:29

## 2023-04-24 RX ADMIN — ACETAMINOPHEN 650 MG: 325 TABLET ORAL at 16:42

## 2023-04-24 NOTE — BH NOTES
Psychiatric Progress Note    Patient: Artem Valiente MRN: [id number]  SSN: [id number]    YOB: 1985  Age: 42 y.o. Sex: female      Admit Date: 4/21/2023    LOS: 2 days     Subjective:     Artem Valiente  reports feeling better overall, but continues to have thoughts of suicide. Moods are depressed. Denies SI/HI/AH/VH. No aggression or violence. Appropriately interactive and aware. Tolerating medications well. Eating well and sleeping well (6 hrs). Tolerated Tylenol, Atarax and Zyprexa as needed.     Objective:     Vitals:    04/22/23 0801 04/22/23 2300 04/23/23 0811 04/23/23 2304   BP: 122/81 99/82 116/67 121/77   Pulse: 80 72 85 64   Resp: 16 18 18 20   Temp: 97.3 °F (36.3 °C) 98.2 °F (36.8 °C) 97.9 °F (36.6 °C) 97.5 °F (36.4 °C)   SpO2: 94% 100% 99% 98%   Weight:       Height:            Mental Status Exam:   Sensorium  oriented to time, place and person   Relations cooperative   Eye Contact appropriate   Appearance:  age appropriate   Speech:  normal volume and non-pressured   Thought Process: goal directed   Thought Content free of delusions and free of hallucinations   Suicidal ideations no intention   Mood:  depressed   Affect:  constricted   Memory   adequate   Concentration:  adequate   Insight:  limited   Judgment:  limited       MEDICATIONS:  Current Facility-Administered Medications   Medication Dose Route Frequency    naloxone (NARCAN) injection 0.4 mg  0.4 mg IntraMUSCular EVERY 2 MINUTES AS NEEDED    OLANZapine (ZyPREXA) tablet 5 mg  5 mg Oral Q6H PRN    haloperidol lactate (HALDOL) injection 5 mg  5 mg IntraMUSCular Q6H PRN    benztropine (COGENTIN) tablet 1 mg  1 mg Oral BID PRN    diphenhydrAMINE (BENADRYL) injection 50 mg  50 mg IntraMUSCular BID PRN    hydrOXYzine HCL (ATARAX) tablet 50 mg  50 mg Oral TID PRN    LORazepam (ATIVAN) injection 1 mg  1 mg IntraMUSCular Q4H PRN    traZODone (DESYREL) tablet 50 mg  50 mg Oral QHS PRN    acetaminophen (TYLENOL) tablet 650 mg 650 mg Oral Q4H PRN    magnesium hydroxide (MILK OF MAGNESIA) 400 mg/5 mL oral suspension 30 mL  30 mL Oral DAILY PRN    aspirin delayed-release tablet 81 mg  81 mg Oral DAILY    buprenorphine-naloxone (SUBOXONE) 8-2mg SL tablet  3 Tablet SubLINGual DAILY    ondansetron (ZOFRAN ODT) tablet 4 mg  4 mg Oral Q6H PRN    sertraline (ZOLOFT) tablet 200 mg  200 mg Oral DAILY    ziprasidone (GEODON) capsule 20 mg  20 mg Oral BID WITH MEALS      DISCUSSION:   the risks and benefits of the proposed medication  patient given opportunity to ask questions    Lab/Data Review: All lab results for the last 24 hours reviewed. No results found for this or any previous visit (from the past 24 hour(s)). Assessment:     Principal Problem:    Unspecified psychosis not due to a substance or known physiological condition ([facility] [address] (4/21/2023)        Plan:     Continue current care  Collateral information  Medication modification as appropriate  Disposition planning with social work    I certify that this patient's inpatient psychiatric hospital services furnished since the previous certification were, and continue to be, required for treatment that could reasonably be expected to improve the patient's condition, or for diagnostic study, and that the patient continues to need, on a daily basis, active treatment furnished directly by or requiring the supervision of inpatient psychiatric facility personnel. In addition, the hospital records show that services furnished were intensive treatment services, admission or related services, or equivalent services.   Signed By: Deven López MD     April 23, 2023

## 2023-04-24 NOTE — PROGRESS NOTES
Problem: Discharge Planning  Goal: *Discharge to safe environment  Outcome: Progressing Towards Goal  Note: Patient identifies home as a safe environment and plans to return home at discharge.   Goal: *Knowledge of medication management  Outcome: Progressing Towards Goal  Note: Patient agrees to take medications as prescribed.   Goal: *Knowledge of discharge instructions  Outcome: Progressing Towards Goal  Note: Patient verbalizes understanding of goals for treatment and safe discharge.

## 2023-04-24 NOTE — PROGRESS NOTES
Pt presents with anxious, tearful affect. Pt paranoid. Delusional statements about  getting shot and spy ware installed on her phone. Pt states that she has lice. Staff RN checked patient for lice to confirm. Pt also stated that the woman that her  was cheating on her with put bed bugs in her bed at home. Pt stripped sheets in room and requested new sheets. Asking on how to get rid of bed bugs at home. Pt endorsing anxiety 10/10. Pt given PRN Vistaril, Zyprexa, and Gabapentin throughout shift. Pt still endorsing anxiety. Endorsing SI with no plan. Denies HI. Endorsing AH of voices telling her that people are going to kill her. C/O Left foot pain 7/10. Given PRN Tylenol. Given crossword puzzles and encouraged to attend group as distraction techniques. Pt also often asks for snacks/ginger ale.  Pt informed of snack time and meal time      Problem: Suicide  Goal: *STG: Remains safe in hospital  Outcome: Progressing Towards Goal  Goal: *STG/LTG: Complies with medication therapy  Outcome: Progressing Towards Goal     Problem: Depressed Mood (Adult/Pediatric)  Goal: *STG: Remains safe in hospital  Outcome: Progressing Towards Goal     Problem: Suicide  Goal: *STG:  Verbalizes alternative ways of dealing with maladaptive feelings/behaviors  Outcome: Not Progressing Towards Goal     Problem: Depressed Mood (Adult/Pediatric)  Goal: *STG: Demonstrates reduction in symptoms and increase in insight into coping skills/future focused  Outcome: Not Progressing Towards Goal

## 2023-04-24 NOTE — BH NOTES
Psychiatric Progress Note    Patient: Ciara Bates MRN: [id number]  SSN: [id number]    YOB: 1985  Age: 37 y.o.  Sex: female      Admit Date: 4/21/2023    LOS: 2 days     Subjective:     Ciara Bates  reports feeling better overall, but continues to have thoughts of suicide.  Moods are depressed.  Denies SI/HI/AH/VH.  No aggression or violence.  Appropriately interactive and aware. Tolerating medications well.  Eating well and sleeping well (6 hrs).  Tolerated Tylenol, Atarax and Zyprexa as needed.    Objective:     Vitals:    04/22/23 0801 04/22/23 2300 04/23/23 0811 04/23/23 2304   BP: 122/81 99/82 116/67 121/77   Pulse: 80 72 85 64   Resp: 16 18 18 20   Temp: 97.3 °F (36.3 °C) 98.2 °F (36.8 °C) 97.9 °F (36.6 °C) 97.5 °F (36.4 °C)   SpO2: 94% 100% 99% 98%   Weight:       Height:            Mental Status Exam:   Sensorium  oriented to time, place and person   Relations cooperative   Eye Contact appropriate   Appearance:  age appropriate   Speech:  normal volume and non-pressured   Thought Process: goal directed   Thought Content free of delusions and free of hallucinations   Suicidal ideations no intention   Mood:  depressed   Affect:  constricted   Memory   adequate   Concentration:  adequate   Insight:  limited   Judgment:  limited       MEDICATIONS:  Current Facility-Administered Medications   Medication Dose Route Frequency    naloxone (NARCAN) injection 0.4 mg  0.4 mg IntraMUSCular EVERY 2 MINUTES AS NEEDED    OLANZapine (ZyPREXA) tablet 5 mg  5 mg Oral Q6H PRN    haloperidol lactate (HALDOL) injection 5 mg  5 mg IntraMUSCular Q6H PRN    benztropine (COGENTIN) tablet 1 mg  1 mg Oral BID PRN    diphenhydrAMINE (BENADRYL) injection 50 mg  50 mg IntraMUSCular BID PRN    hydrOXYzine HCL (ATARAX) tablet 50 mg  50 mg Oral TID PRN    LORazepam (ATIVAN) injection 1 mg  1 mg IntraMUSCular Q4H PRN    traZODone (DESYREL) tablet 50 mg  50 mg Oral QHS PRN    acetaminophen (TYLENOL) tablet 650 mg   650 mg Oral Q4H PRN    magnesium hydroxide (MILK OF MAGNESIA) 400 mg/5 mL oral suspension 30 mL  30 mL Oral DAILY PRN    aspirin delayed-release tablet 81 mg  81 mg Oral DAILY    buprenorphine-naloxone (SUBOXONE) 8-2mg SL tablet  3 Tablet SubLINGual DAILY    ondansetron (ZOFRAN ODT) tablet 4 mg  4 mg Oral Q6H PRN    sertraline (ZOLOFT) tablet 200 mg  200 mg Oral DAILY    ziprasidone (GEODON) capsule 20 mg  20 mg Oral BID WITH MEALS      DISCUSSION:   the risks and benefits of the proposed medication  patient given opportunity to ask questions    Lab/Data Review:  All lab results for the last 24 hours reviewed.     No results found for this or any previous visit (from the past 24 hour(s)).      Assessment:     Principal Problem:    Unspecified psychosis not due to a substance or known physiological condition (HCC) (4/21/2023)        Plan:     Continue current care  Collateral information  Medication modification as appropriate  Disposition planning with social work    I certify that this patient's inpatient psychiatric hospital services furnished since the previous certification were, and continue to be, required for treatment that could reasonably be expected to improve the patient's condition, or for diagnostic study, and that the patient continues to need, on a daily basis, active treatment furnished directly by or requiring the supervision of inpatient psychiatric facility personnel. In addition, the hospital records show that services furnished were intensive treatment services, admission or related services, or equivalent services.  Signed By: Roverto Vuong MD     April 23, 2023

## 2023-04-24 NOTE — GROUP NOTE
MYNOR  GROUP DOCUMENTATION INDIVIDUAL                                                                          Group Therapy Note    Date: 4/24/2023    Group Start Time: 0900  Group End Time: 5681  Group Topic: Community Meeting    [address], [address] [facility] GROUP DOCUMENTATION GROUP    Group Therapy Note    Attendees: 6       Attendance: Did not attend    [facility]

## 2023-04-24 NOTE — BH NOTES
PSYCHOSOCIAL ASSESSMENT  :Patient identifying info:   Ciara Bates is a 37 y.o., female admitted 4/21/2023  3:34 PM     Presenting problem and precipitating factors: Pt came to ED 4/21/23 expressing paranoid belief that she and her  are being threatened to be shot, possible AH and SI without plan as a result. She was discharged from [facility] early last week. Pt met with Tx team in conference room and reported she came to hospital d/t SI without plan and AH \"I hear voices sometimes\" and \"to be on my medications as scheduled constantly.\" She denied HI and VH but reported passive SI and AH telling her she is \"a piece of shit, just kill yourself.\" She expressed paranoid beliefs that her ex-friend put spy ware on her phone, stole her phone to interfere with her ability to access  Tx. Pt reported having a new patient appointment with Arsh Farah, but cannot remember the practice. Pt reported she recently relapsed on amphetamines and described being dosed by \"a janneth that gave me a muffin at the cheesecake shop near my house.\" Psychiatric educated on dangerous combination of klonopin and suboxone, as PT reports taking. Pt verbalized understanding but still wanting that medication combination. MD advised it would not be given in the hospital and discussed trying to simply medications. Pt agreeable. Pt expressing unmanaged anxiety and asking about medications. Pt encouraged to call and talk to family today for support.     2:42pm - MSW attempted to call Pt's , Amrit Bates ([phone]) voicemail box was full and unable to leave a message.     Mental status assessment: Appears sad, overwhelmed hopeless, tangential, flight of idea, poor memory and concentration, poor attention    Strengths/Recreation/Coping Skills:supportive family, stable housing, willingness to be treated    Collateral information: , Amrit Bates ([phone]) voicemail box was full and unable to leave a message.     Current  psychiatric /substance abuse providers and contact info:  [facility] and never made appointment    Previous psychiatric/substance abuse providers and response to treatment: [facility] - yes in past for SI   Recently at [facility] felt better at [place] but then SI came back at home    Family history of mental illness or substance abuse: none reported    Substance abuse history:    Social History     Tobacco Use    Smoking status: Former    Smokeless tobacco: Never   Substance Use Topics    Alcohol use: Not Currently       History of biomedical complications associated with substance abuse: none reported    Patient's current acceptance of treatment or motivation for change: voluntarily seeking Tx but fixated on specific medications that are not in her best interest    Family constellation: , 2 children ages 18 and 19.    Is significant other involved? Yes    Describe support system:     Describe living arrangements and home environment: lives at home with  and 2 kids    GUARDIAN/POA: NO    Guardian Name: n/a    Guardian Contact: n/a    Health issues:   Hospital Problems  Date Reviewed: 2022            Codes Class Noted POA    Amphetamine use disorder, moderate (HCC) ICD-10-CM: F15.20  ICD-9-CM: 304.40  2023 Unknown        Opioid use disorder, mild, abuse (HCC) ICD-10-CM: F11.10  ICD-9-CM: 305.50  2023 Unknown        * (Principal) Psychosis (HCC) ICD-10-CM: F29  ICD-9-CM: 298.9  2023 Unknown           Trauma history: none reported    Legal issues: none reported    History of  service: no    Financial status: SSI    Yarsani/cultural factors: none reported    Education/work history: not assessed    Have you been licensed as a health care professional (current or ): no    Describe coping skills:gregg Parsons  2023

## 2023-04-24 NOTE — GROUP NOTE
MYNOR  GROUP DOCUMENTATION INDIVIDUAL                                                                          Group Therapy Note    Date: 4/24/2023    Group Start Time: 1000  Group End Time: 1100  Group Topic: Topic Group    [facility] 3 ACUTE BEHAV [facility], [address] [facility] GROUP DOCUMENTATION GROUP    Group Therapy Note    Attendees: 7       Attendance: Attended    Patient's Goal:  To participate in relaxation activity    Interventions/techniques: Supported-art    Follows Directions:  Followed directions    Interactions: Interacted appropriately    Mental Status: Calm and Flat    Behavior/appearance: Attentive and Cooperative    Goals Achieved: Able to engage in interactions, Able to listen to others, Able to self-disclose, and Discussed coping      Additional Notes:  Good concentration on art project    Bess Carroll

## 2023-04-24 NOTE — BH NOTES
Psychiatric Progress Note    Patient: Ciara Bates MRN: [id number]  SSN: [id number]    YOB: 1985  Age: 37 y.o.  Sex: female      Admit Date: 4/21/2023    LOS: 3 days     Subjective:     Ciara Bates  reports feeling better overall, but continues to have thoughts of suicide.  Moods are depressed.  Denies SI/HI/AH/VH.  No aggression or violence.  Appropriately interactive and aware. Tolerating medications well.  Eating well and sleeping well (6 hrs).  Tolerated Tylenol, Atarax and Zyprexa as needed.    04/24 - no acute overnight events. The patient is medication compliant, she slept 8 hours and is able to make her needs known. Patient medication compliant, and continues to report CAH and AH. PI is largely unchanged -- she continues to state that her  is dead. Patient denies active thoughts of self harm and is otherwise in fair behavioral control. She has been requesting anxiolytic agents (Klonopin) as well as stimulants for concentration. Discussed various contraindications for these agents including concomitant opioid use and stimulant abuse history, she voices understanding.    Objective:     Vitals:    04/22/23 0801 04/22/23 2300 04/23/23 0811 04/23/23 2304   BP: 122/81 99/82 116/67 121/77   Pulse: 80 72 85 64   Resp: 16 18 18 20   Temp: 97.3 °F (36.3 °C) 98.2 °F (36.8 °C) 97.9 °F (36.6 °C) 97.5 °F (36.4 °C)   SpO2: 94% 100% 99% 98%   Weight:       Height:            Mental Status Exam:   Sensorium  oriented to time, place and person   Relations cooperative   Eye Contact appropriate   Appearance:  age appropriate   Speech:  non-pressured and soft   Thought Process: goal directed   Thought Content AH, paranoid delusions   Suicidal ideations no plan  and contracts for safety   Mood:  depressed   Affect:  constricted   Memory   adequate   Concentration:  adequate   Insight:  poor   Judgment:  limited       MEDICATIONS:  Current Facility-Administered Medications   Medication Dose Route  Frequency    naloxone (NARCAN) injection 0.4 mg  0.4 mg IntraMUSCular EVERY 2 MINUTES AS NEEDED    OLANZapine (ZyPREXA) tablet 5 mg  5 mg Oral Q6H PRN    haloperidol lactate (HALDOL) injection 5 mg  5 mg IntraMUSCular Q6H PRN    benztropine (COGENTIN) tablet 1 mg  1 mg Oral BID PRN    diphenhydrAMINE (BENADRYL) injection 50 mg  50 mg IntraMUSCular BID PRN    hydrOXYzine HCL (ATARAX) tablet 50 mg  50 mg Oral TID PRN    LORazepam (ATIVAN) injection 1 mg  1 mg IntraMUSCular Q4H PRN    traZODone (DESYREL) tablet 50 mg  50 mg Oral QHS PRN    acetaminophen (TYLENOL) tablet 650 mg  650 mg Oral Q4H PRN    magnesium hydroxide (MILK OF MAGNESIA) 400 mg/5 mL oral suspension 30 mL  30 mL Oral DAILY PRN    aspirin delayed-release tablet 81 mg  81 mg Oral DAILY    buprenorphine-naloxone (SUBOXONE) 8-2mg SL tablet  3 Tablet SubLINGual DAILY    ondansetron (ZOFRAN ODT) tablet 4 mg  4 mg Oral Q6H PRN    sertraline (ZOLOFT) tablet 200 mg  200 mg Oral DAILY    ziprasidone (GEODON) capsule 20 mg  20 mg Oral BID WITH MEALS      DISCUSSION:   the risks and benefits of the proposed medication  patient given opportunity to ask questions    Lab/Data Review:  All lab results for the last 24 hours reviewed.     No results found for this or any previous visit (from the past 24 hour(s)).      Assessment:     Principal Problem:    Psychosis (HCC) (4/21/2023)        Plan:     Continue current care  Collateral information  - DISCONTINUE Geodon due to change in treatment plan  - START Abilify 15 mg QDAY for psychosis, mood adjunct  - START Neurontin 300 mg BID and 100 mg TID PRN anxiety  - CONTINUE Suboxone 8 mg SL QDAY for opioid use disorder  - CONTINUE Zoloft 200 mg QDAY for MDD  Disposition planning with social work    I certify that this patient's inpatient psychiatric hospital services furnished since the previous certification were, and continue to be, required for treatment that could reasonably be expected to improve the patient's  condition, or for diagnostic study, and that the patient continues to need, on a daily basis, active treatment furnished directly by or requiring the supervision of inpatient psychiatric facility personnel. In addition, the hospital records show that services furnished were intensive treatment services, admission or related services, or equivalent services.    Signed By: Moses Millard MD     April 24, 2023

## 2023-04-24 NOTE — PROGRESS NOTES
Laboratory monitoring for mood stabilizer and antipsychotics:    Recommended baseline monitoring has NOT been completed based on this patient's current medication regimen.The following labs have been ordered to complete baseline monitoring:lipid panel, TSH, HgbA1c        The patient is currently taking the following medication(s):   Current Facility-Administered Medications   Medication Dose Route Frequency    aspirin delayed-release tablet 81 mg  81 mg Oral DAILY    buprenorphine-naloxone (SUBOXONE) 8-2mg SL tablet  3 Tablet SubLINGual DAILY    sertraline (ZOLOFT) tablet 200 mg  200 mg Oral DAILY    ziprasidone (GEODON) capsule 20 mg  20 mg Oral BID WITH MEALS       Height, Weight, BMI Estimation  Estimated body mass index is 45.73 kg/m² as calculated from the following:    Height as of this encounter: 157.5 cm (62\").    Weight as of this encounter: 113.4 kg (250 lb).     Renal Function, Hepatic Function and Chemistry  Estimated Creatinine Clearance: 108.3 mL/min (by C-G formula based on SCr of 0.83 mg/dL).    Lab Results   Component Value Date/Time    Sodium 139 04/21/2023 05:05 PM    Potassium 3.7 04/21/2023 05:05 PM    Chloride 103 04/21/2023 05:05 PM    CO2 26 04/21/2023 05:05 PM    Anion gap 10 04/21/2023 05:05 PM    Glucose 94 04/21/2023 05:05 PM    BUN 13 04/21/2023 05:05 PM    Creatinine 0.83 04/21/2023 05:05 PM    BUN/Creatinine ratio 16 04/21/2023 05:05 PM    GFR est AA >60 05/11/2022 03:45 AM    GFR est non-AA >60 05/11/2022 03:45 AM    Calcium 8.5 04/21/2023 05:05 PM    ALT (SGPT) 15 04/21/2023 05:05 PM    Alk. phosphatase 72 04/21/2023 05:05 PM    Protein, total 7.4 04/21/2023 05:05 PM    Albumin 3.8 04/21/2023 05:05 PM    Globulin 3.6 04/21/2023 05:05 PM    A-G Ratio 1.1 04/21/2023 05:05 PM    Bilirubin, total 0.4 04/21/2023 05:05 PM       Lab Results   Component Value Date/Time    Glucose 94 04/21/2023 05:05 PM     Hematology  Lab Results   Component Value Date/Time    WBC 7.2 04/21/2023 05:05 PM     HGB 12.4 04/21/2023 05:05 PM    HCT 38.1 04/21/2023 05:05 PM    PLATELET 319 04/21/2023 05:05 PM    MCV 93.8 04/21/2023 05:05 PM       Vitals  Visit Vitals  /77 (BP 1 Location: Left upper arm, BP Patient Position: Sitting)   Pulse 64   Temp 97.5 °F (36.4 °C)   Resp 20   Ht 157.5 cm (62\")   Wt 113.4 kg (250 lb)   SpO2 98%   Breastfeeding No   BMI 45.73 kg/m²       Pregnancy Test  Lab Results   Component Value Date/Time    Pregnancy test,urine (POC) Negative 04/21/2023 07:39 PM    HCG, Ql. Negative 05/09/2022 07:47 PM       Giselle Espinosa, PHARMD  [phone] (pharmacy)

## 2023-04-24 NOTE — PROGRESS NOTES
Laboratory monitoring for mood stabilizer and antipsychotics:    Recommended baseline monitoring has NOT been completed based on this patient's current medication regimen. The following labs have been ordered to complete baseline monitoring:lipid panel, TSH, HgbA1c        The patient is currently taking the following medication(s):   Current Facility-Administered Medications   Medication Dose Route Frequency    aspirin delayed-release tablet 81 mg  81 mg Oral DAILY    buprenorphine-naloxone (SUBOXONE) 8-2mg SL tablet  3 Tablet SubLINGual DAILY    sertraline (ZOLOFT) tablet 200 mg  200 mg Oral DAILY    ziprasidone (GEODON) capsule 20 mg  20 mg Oral BID WITH MEALS       Height, Weight, BMI Estimation  Estimated body mass index is 45.73 kg/m² as calculated from the following:    Height as of this encounter: 157.5 cm (62\"). Weight as of this encounter: 113.4 kg (250 lb). Renal Function, Hepatic Function and Chemistry  Estimated Creatinine Clearance: 108.3 mL/min (by C-G formula based on SCr of 0.83 mg/dL). Lab Results   Component Value Date/Time    Sodium 139 04/21/2023 05:05 PM    Potassium 3.7 04/21/2023 05:05 PM    Chloride 103 04/21/2023 05:05 PM    CO2 26 04/21/2023 05:05 PM    Anion gap 10 04/21/2023 05:05 PM    Glucose 94 04/21/2023 05:05 PM    BUN 13 04/21/2023 05:05 PM    Creatinine 0.83 04/21/2023 05:05 PM    BUN/Creatinine ratio 16 04/21/2023 05:05 PM    GFR est AA >60 05/11/2022 03:45 AM    GFR est non-AA >60 05/11/2022 03:45 AM    Calcium 8.5 04/21/2023 05:05 PM    ALT (SGPT) 15 04/21/2023 05:05 PM    Alk.  phosphatase 72 04/21/2023 05:05 PM    Protein, total 7.4 04/21/2023 05:05 PM    Albumin 3.8 04/21/2023 05:05 PM    Globulin 3.6 04/21/2023 05:05 PM    A-G Ratio 1.1 04/21/2023 05:05 PM    Bilirubin, total 0.4 04/21/2023 05:05 PM       Lab Results   Component Value Date/Time    Glucose 94 04/21/2023 05:05 PM     Hematology  Lab Results   Component Value Date/Time    WBC 7.2 04/21/2023 05:05 PM HGB 12.4 04/21/2023 05:05 PM    HCT 38.1 04/21/2023 05:05 PM    PLATELET 357 23/55/7827 05:05 PM    MCV 93.8 04/21/2023 05:05 PM       Vitals  Visit Vitals  /77 (BP 1 Location: Left upper arm, BP Patient Position: Sitting)   Pulse 64   Temp 97.5 °F (36.4 °C)   Resp 20   Ht 157.5 cm (62\")   Wt 113.4 kg (250 lb)   SpO2 98%   Breastfeeding No   BMI 45.73 kg/m²       Pregnancy Test  Lab Results   Component Value Date/Time    Pregnancy test,urine (POC) Negative 04/21/2023 07:39 PM    HCG, Ql. Negative 05/09/2022 07:47 PM       Nkechi Salazar, [address] (pharmacy)

## 2023-04-24 NOTE — PROGRESS NOTES
Problem: Suicide  Goal: *STG: Remains safe in hospital  Outcome: Progressing Towards Goal  Goal: *STG: Seeks staff when feelings of self harm or harm towards others arise  Outcome: Progressing Towards Goal  Goal: *STG/LTG: Complies with medication therapy  Outcome: Progressing Towards Goal      Pt visible in the milieu,  appear calm and cooperative, interactive with staff and peers with  dull affect and anxious mood. Pt continue to have persecutory delusion saying every other persons are care for except her. Pt not in any obvious  pysch or medical distress. On assessment, endorse anxiety at 9/10, depression at 10/10 , denied SI, HI, and AVH. PRN atarax, and trazodone administered. Med's and meals compliant. No behaviors noted. Vital signs entered. Rounding in progress. Pt slept for the total of 8 hours. Shift uneventful.

## 2023-04-24 NOTE — GROUP NOTE
MYNOR  GROUP DOCUMENTATION INDIVIDUAL                                                                          Group Therapy Note    Date: 4/24/2023    Group Start Time: 1400  Group End Time: 1500  Group Topic: Recreational/Music Therapy    [address] [facility] 3 ACUTE BEHAV [facility], [address] [facility] GROUP DOCUMENTATION GROUP    Group Therapy Note    Attendees: 4       Attendance: Attended    Patient's Goal:  To concentrate on selected task    Interventions/techniques: Supported-crafts,games,music      Interactions: Minimal    Mental Status: Flat and Preoccupied    Behavior/appearance: Needed prompting    Goals Achieved:        Additional Notes:  Flat affect,distracted,declined active participation-left session early    [place] Necessary

## 2023-04-24 NOTE — BH NOTES
PSYCHOSOCIAL ASSESSMENT  :Patient identifying info:   Jace Mendez is a 42 y.o., female admitted 4/21/2023  3:34 PM     Presenting problem and precipitating factors: Pt came to ED 4/21/23 expressing paranoid belief that she and her  are being threatened to be shot, possible AH and SI without plan as a result. She was discharged from [facility] early last week. Pt met with Tx team in conference room and reported she came to hospital d/t SI without plan and AH \"I hear voices sometimes\" and \"to be on my medications as scheduled constantly. \" She denied HI and VH but reported passive SI and AH telling her she is [place] piece of shit, just kill yourself. \" She expressed paranoid beliefs that her ex-friend put spy ware on her phone, stole her phone to interfere with her ability to access [address] Tx. Pt reported having a new patient appointment with Yu Young, but cannot remember the practice. Pt reported she recently relapsed on amphetamines and described being dosed by Jose M lagunas that gave me a muffin at the [employer] shop near my house. \" Psychiatric educated on dangerous combination of klonopin and suboxone, as PT reports taking. Pt verbalized understanding but still wanting that medication combination. MD advised it would not be given in the hospital and discussed trying to simply medications. Pt agreeable. Pt expressing unmanaged anxiety and asking about medications. Pt encouraged to call and talk to family today for support. 2:42pm - MSW attempted to call Pt's Rebecca ([phone]) voicemail box was full and unable to leave a message. Mental status assessment: Appears sad, overwhelmed hopeless, tangential, flight of idea, poor memory and concentration, poor attention    Strengths/Recreation/Coping Skills:supportive family, stable housing, willingness to be treated    Collateral information: Rebecca ([phone]) voicemail box was full and unable to leave a message.      Current psychiatric /substance abuse providers and contact info:  Devin Gray, new psychiatrist and never made appointment    Previous psychiatric/substance abuse providers and response to treatment: [facility] - yes in past for SI   Recently at [facility] felt better at DC but then SI came back at home    Family history of mental illness or substance abuse: none reported    Substance abuse history:    Social History     Tobacco Use    Smoking status: Former    Smokeless tobacco: Never   Substance Use Topics    Alcohol use: Not Currently       History of biomedical complications associated with substance abuse: none reported    Patient's current acceptance of treatment or motivation for change: voluntarily seeking Tx but fixated on specific medications that are not in her best interest    Family constellation: , 2 children ages 25 and 23. Is significant other involved?  Yes    Describe support system:     Describe living arrangements and home environment: lives at home with  and 2 kids    GUARDIAN/POA: NO    Guardian Name: n/a    Guardian Contact: n/a    Health issues:   Hospital Problems  Date Reviewed: 2022            Codes Class Noted POA    Amphetamine use disorder, moderate ([facility] ICD-10-CM: F15.20  ICD-9-CM: 304.40  2023 Unknown        Opioid use disorder, mild, abuse ([facility] ICD-10-CM: F11.10  ICD-9-CM: 305.50  2023 Unknown        * (Principal) Psychosis ([facility] ICD-10-CM: F29  ICD-9-CM: 298.9  2023 Unknown           Trauma history: none reported    Legal issues: none reported    History of  service: no    Financial status: SSI    Episcopalian/cultural factors: none reported    Education/work history: not assessed    Have you been licensed as a health care professional (current or ): no    Describe coping skills:gregg Parsons  2023

## 2023-04-24 NOTE — BH NOTES
Psychiatric Progress Note    Patient: Todd Hdez MRN: [id number]  SSN: [id number]    YOB: 1985  Age: 42 y.o. Sex: female      Admit Date: 4/21/2023    LOS: 3 days     Subjective:     Todd Hdez  reports feeling better overall, but continues to have thoughts of suicide. Moods are depressed. Denies SI/HI/AH/VH. No aggression or violence. Appropriately interactive and aware. Tolerating medications well. Eating well and sleeping well (6 hrs). Tolerated Tylenol, Atarax and Zyprexa as needed. 04/24 - no acute overnight events. The patient is medication compliant, she slept 8 hours and is able to make her needs known. Patient medication compliant, and continues to report CAH and AH. PI is largely unchanged -- she continues to state that her  is dead. Patient denies active thoughts of self harm and is otherwise in fair behavioral control. She has been requesting anxiolytic agents (Klonopin) as well as stimulants for concentration. Discussed various contraindications for these agents including concomitant opioid use and stimulant abuse history, she voices understanding.     Objective:     Vitals:    04/22/23 0801 04/22/23 2300 04/23/23 0811 04/23/23 2304   BP: 122/81 99/82 116/67 121/77   Pulse: 80 72 85 64   Resp: 16 18 18 20   Temp: 97.3 °F (36.3 °C) 98.2 °F (36.8 °C) 97.9 °F (36.6 °C) 97.5 °F (36.4 °C)   SpO2: 94% 100% 99% 98%   Weight:       Height:            Mental Status Exam:   Sensorium  oriented to time, place and person   Relations cooperative   Eye Contact appropriate   Appearance:  age appropriate   Speech:  non-pressured and soft   Thought Process: goal directed   Thought Content AH, paranoid delusions   Suicidal ideations no plan  and contracts for safety   Mood:  depressed   Affect:  constricted   Memory   adequate   Concentration:  adequate   Insight:  poor   Judgment:  limited       MEDICATIONS:  Current Facility-Administered Medications   Medication Dose Route Frequency    naloxone (NARCAN) injection 0.4 mg  0.4 mg IntraMUSCular EVERY 2 MINUTES AS NEEDED    OLANZapine (ZyPREXA) tablet 5 mg  5 mg Oral Q6H PRN    haloperidol lactate (HALDOL) injection 5 mg  5 mg IntraMUSCular Q6H PRN    benztropine (COGENTIN) tablet 1 mg  1 mg Oral BID PRN    diphenhydrAMINE (BENADRYL) injection 50 mg  50 mg IntraMUSCular BID PRN    hydrOXYzine HCL (ATARAX) tablet 50 mg  50 mg Oral TID PRN    LORazepam (ATIVAN) injection 1 mg  1 mg IntraMUSCular Q4H PRN    traZODone (DESYREL) tablet 50 mg  50 mg Oral QHS PRN    acetaminophen (TYLENOL) tablet 650 mg  650 mg Oral Q4H PRN    magnesium hydroxide (MILK OF MAGNESIA) 400 mg/5 mL oral suspension 30 mL  30 mL Oral DAILY PRN    aspirin delayed-release tablet 81 mg  81 mg Oral DAILY    buprenorphine-naloxone (SUBOXONE) 8-2mg SL tablet  3 Tablet SubLINGual DAILY    ondansetron (ZOFRAN ODT) tablet 4 mg  4 mg Oral Q6H PRN    sertraline (ZOLOFT) tablet 200 mg  200 mg Oral DAILY    ziprasidone (GEODON) capsule 20 mg  20 mg Oral BID WITH MEALS      DISCUSSION:   the risks and benefits of the proposed medication  patient given opportunity to ask questions    Lab/Data Review: All lab results for the last 24 hours reviewed. No results found for this or any previous visit (from the past 24 hour(s)).       Assessment:     Principal Problem:    Psychosis ([facility] (4/21/2023)        Plan:     Continue current care  Collateral information  - DISCONTINUE Geodon due to change in treatment plan  - START Abilify 15 mg QDAY for psychosis, mood adjunct  - START Neurontin 300 mg BID and 100 mg TID PRN anxiety  - CONTINUE Suboxone 8 mg SL QDAY for opioid use disorder  - CONTINUE Zoloft 200 mg QDAY for MDD  Disposition planning with social work    I certify that this patient's inpatient psychiatric hospital services furnished since the previous certification were, and continue to be, required for treatment that could reasonably be expected to improve the patient's condition, or for diagnostic study, and that the patient continues to need, on a daily basis, active treatment furnished directly by or requiring the supervision of inpatient psychiatric facility personnel. In addition, the hospital records show that services furnished were intensive treatment services, admission or related services, or equivalent services.     Signed By: Rufus Tracy MD     April 24, 2023

## 2023-04-24 NOTE — GROUP NOTE
MYNOR  GROUP DOCUMENTATION INDIVIDUAL                                                                          Group Therapy Note    Date: 4/24/2023    Group Start Time: 1000  Group End Time: 1100  Group Topic: Topic Group    [facility] 3 ACUTE BEHAV [facility]    Ariadna Caraballo  GROUP DOCUMENTATION GROUP    Group Therapy Note    Attendees: 7       Attendance: Attended    Patient's Goal:  To participate in relaxation activity    Interventions/techniques: Supported-art    Follows Directions: Followed directions    Interactions: Interacted appropriately    Mental Status: Calm and Flat    Behavior/appearance: Attentive and Cooperative    Goals Achieved: Able to engage in interactions, Able to listen to others, Able to self-disclose, and Discussed coping      Additional Notes:  Good concentration on art project    Ariadna Caraballo

## 2023-04-24 NOTE — GROUP NOTE
MYNOR VILLANUEVA GROUP DOCUMENTATION INDIVIDUAL                                                                          Group Therapy Note    Date: 4/24/2023    Group Start Time: 0900  Group End Time: 0925  Group Topic: Community Meeting    [facility] BEHAVIORAL TH    Ayanna Hines GROUP DOCUMENTATION GROUP    Group Therapy Note    Attendees: 6       Attendance: Did not attend    Ayanna Hines

## 2023-04-24 NOTE — GROUP NOTE
MYNOR  GROUP DOCUMENTATION INDIVIDUAL                                                                          Group Therapy Note    Date: 4/24/2023    Group Start Time: 1400  Group End Time: 1500  Group Topic: Recreational/Music Therapy    [facility] 3 ACUTE BEHAV [facility]    Ariadna Caraballo  GROUP DOCUMENTATION GROUP    Group Therapy Note    Attendees: 4       Attendance: Attended    Patient's Goal:  To concentrate on selected task    Interventions/techniques: Supported-crafts,games,music      Interactions: Minimal    Mental Status: Flat and Preoccupied    Behavior/appearance: Needed prompting    Goals Achieved:       Additional Notes:  Flat affect,distracted,declined active participation-left session early    Ariadna Caraballo

## 2023-04-25 LAB
CHOLEST SERPL-MCNC: 186 MG/DL
EST. AVERAGE GLUCOSE BLD GHB EST-MCNC: 103 MG/DL
HBA1C MFR BLD: 5.2 % (ref 4–5.6)
HDLC SERPL-MCNC: 41 MG/DL
HDLC SERPL: 4.5 (ref 0–5)
LDLC SERPL CALC-MCNC: 106.2 MG/DL (ref 0–100)
SARS-COV-2 RNA RESP QL NAA+PROBE: NOT DETECTED
SOURCE, COVRS: NORMAL
TRIGL SERPL-MCNC: 194 MG/DL (ref ?–150)
TSH SERPL DL<=0.05 MIU/L-ACNC: 2.17 UIU/ML (ref 0.36–3.74)
VLDLC SERPL CALC-MCNC: 38.8 MG/DL

## 2023-04-25 PROCEDURE — 74011250637 HC RX REV CODE- 250/637: Performed by: PSYCHIATRY & NEUROLOGY

## 2023-04-25 PROCEDURE — 99233 SBSQ HOSP IP/OBS HIGH 50: CPT | Performed by: PSYCHIATRY & NEUROLOGY

## 2023-04-25 PROCEDURE — 74011250637 HC RX REV CODE- 250/637: Performed by: NURSE PRACTITIONER

## 2023-04-25 PROCEDURE — 84443 ASSAY THYROID STIM HORMONE: CPT

## 2023-04-25 PROCEDURE — 74011250636 HC RX REV CODE- 250/636: Performed by: PSYCHIATRY & NEUROLOGY

## 2023-04-25 PROCEDURE — 80061 LIPID PANEL: CPT

## 2023-04-25 PROCEDURE — 65220000003 HC RM SEMIPRIVATE PSYCH

## 2023-04-25 PROCEDURE — 36415 COLL VENOUS BLD VENIPUNCTURE: CPT

## 2023-04-25 PROCEDURE — 83036 HEMOGLOBIN GLYCOSYLATED A1C: CPT

## 2023-04-25 RX ADMIN — ARIPIPRAZOLE 15 MG: 15 TABLET ORAL at 07:27

## 2023-04-25 RX ADMIN — SERTRALINE HYDROCHLORIDE 200 MG: 50 TABLET ORAL at 07:26

## 2023-04-25 RX ADMIN — GABAPENTIN 300 MG: 300 CAPSULE ORAL at 07:27

## 2023-04-25 RX ADMIN — ACETAMINOPHEN 650 MG: 325 TABLET ORAL at 05:31

## 2023-04-25 RX ADMIN — ACETAMINOPHEN 650 MG: 325 TABLET ORAL at 10:03

## 2023-04-25 RX ADMIN — ASPIRIN 81 MG: 81 TABLET, COATED ORAL at 07:27

## 2023-04-25 RX ADMIN — ACETAMINOPHEN 650 MG: 325 TABLET ORAL at 20:44

## 2023-04-25 RX ADMIN — BUPRENORPHINE HYDROCHLORIDE AND NALOXONE HYDROCHLORIDE DIHYDRATE 3 TABLET: 8; 2 TABLET SUBLINGUAL at 07:27

## 2023-04-25 RX ADMIN — GABAPENTIN 400 MG: 100 CAPSULE ORAL at 16:02

## 2023-04-25 RX ADMIN — OLANZAPINE 5 MG: 5 TABLET, FILM COATED ORAL at 08:35

## 2023-04-25 RX ADMIN — TRAZODONE HYDROCHLORIDE 50 MG: 50 TABLET ORAL at 20:44

## 2023-04-25 RX ADMIN — ACETAMINOPHEN 650 MG: 325 TABLET ORAL at 15:41

## 2023-04-25 RX ADMIN — GABAPENTIN 100 MG: 100 CAPSULE ORAL at 11:24

## 2023-04-25 NOTE — PROGRESS NOTES
Laboratory monitoring for mood stabilizer and antipsychotics:    Recommended baseline monitoring has been completed based on this patient's current medication regimen.     The patient is currently taking the following medication(s):   Current Facility-Administered Medications   Medication Dose Route Frequency    gabapentin (NEURONTIN) capsule 300 mg  300 mg Oral BID    ARIPiprazole (ABILIFY) tablet 15 mg  15 mg Oral DAILY    aspirin delayed-release tablet 81 mg  81 mg Oral DAILY    buprenorphine-naloxone (SUBOXONE) 8-2mg SL tablet  3 Tablet SubLINGual DAILY    sertraline (ZOLOFT) tablet 200 mg  200 mg Oral DAILY       Height, Weight, BMI Estimation  Estimated body mass index is 45.73 kg/m² as calculated from the following:    Height as of this encounter: 157.5 cm (62\").    Weight as of this encounter: 113.4 kg (250 lb).     Renal Function, Hepatic Function and Chemistry  Estimated Creatinine Clearance: 108.3 mL/min (by C-G formula based on SCr of 0.83 mg/dL).    Lab Results   Component Value Date/Time    Sodium 139 04/21/2023 05:05 PM    Potassium 3.7 04/21/2023 05:05 PM    Chloride 103 04/21/2023 05:05 PM    CO2 26 04/21/2023 05:05 PM    Anion gap 10 04/21/2023 05:05 PM    Glucose 94 04/21/2023 05:05 PM    BUN 13 04/21/2023 05:05 PM    Creatinine 0.83 04/21/2023 05:05 PM    BUN/Creatinine ratio 16 04/21/2023 05:05 PM    GFR est AA >60 05/11/2022 03:45 AM    GFR est non-AA >60 05/11/2022 03:45 AM    Calcium 8.5 04/21/2023 05:05 PM    ALT (SGPT) 15 04/21/2023 05:05 PM    Alk. phosphatase 72 04/21/2023 05:05 PM    Protein, total 7.4 04/21/2023 05:05 PM    Albumin 3.8 04/21/2023 05:05 PM    Globulin 3.6 04/21/2023 05:05 PM    A-G Ratio 1.1 04/21/2023 05:05 PM    Bilirubin, total 0.4 04/21/2023 05:05 PM       Lab Results   Component Value Date/Time    Glucose 94 04/21/2023 05:05 PM       Lab Results   Component Value Date/Time    Hemoglobin A1c 5.2 04/25/2023 05:27 AM       Hematology  Lab Results   Component Value  Date/Time    WBC 7.2 04/21/2023 05:05 PM    HGB 12.4 04/21/2023 05:05 PM    HCT 38.1 04/21/2023 05:05 PM    PLATELET 319 04/21/2023 05:05 PM    MCV 93.8 04/21/2023 05:05 PM       Lipids  Lab Results   Component Value Date/Time    Cholesterol, total 186 04/25/2023 05:27 AM    HDL Cholesterol 41 04/25/2023 05:27 AM    LDL, calculated 106.2 (H) 04/25/2023 05:27 AM    Triglyceride 194 (H) 04/25/2023 05:27 AM    CHOL/HDL Ratio 4.5 04/25/2023 05:27 AM       Thyroid Function  Lab Results   Component Value Date/Time    TSH 2.17 04/25/2023 05:27 AM     Vitals  Visit Vitals  /77 (BP 1 Location: Right arm, BP Patient Position: Sitting)   Pulse 74   Temp 97.7 °F (36.5 °C)   Resp 18   Ht 157.5 cm (62\")   Wt 113.4 kg (250 lb)   SpO2 98%   Breastfeeding No   BMI 45.73 kg/m²       Pregnancy Test  Lab Results   Component Value Date/Time    Pregnancy test,urine (POC) Negative 04/21/2023 07:39 PM    HCG, Ql. Negative 05/09/2022 07:47 PM       Chiquis Huber, PHARMD, BCPS, BCPP  [phone] (pharmacy)

## 2023-04-25 NOTE — PROGRESS NOTES
Problem: Suicide  Goal: *STG: Remains safe in hospital  Outcome: Progressing Towards Goal  Goal: *STG: Seeks staff when feelings of self harm or harm towards others arise  Outcome: Progressing Towards Goal  Goal: *STG: Attends activities and groups  Outcome: Progressing Towards Goal  Goal: *STG:  Verbalizes alternative ways of dealing with maladaptive feelings/behaviors  Outcome: Progressing Towards Goal     Patient was received resting in bed, patient was calm and cooperative with care, endorses SI with no plan, denies HI/VAH, endorses Anxiety and depression at 10/10. Prn Tylenol, Gabapentin and trazodone given on request, rounding maintained. Lab work completed. Patient slept for 5. 15.

## 2023-04-25 NOTE — GROUP NOTE
[facility] GROUP DOCUMENTATION INDIVIDUAL                                                                          Group Therapy Note    Date: 4/25/2023    Group Start Time: 1400  Group End Time: 1500  Group Topic: Recreational/Music Therapy    [facility] [place] [address] ACUTE BEHAV TH    Boris Petersen [address] GROUP    Group Therapy Note    Attendees: 7       Attendance: Did not attend      Sukhdeep Cai

## 2023-04-25 NOTE — GROUP NOTE
[facility]  GROUP DOCUMENTATION INDIVIDUAL                                                                          Group Therapy Note    Date: 4/25/2023    Group Start Time: 1000  Group End Time: 1100  Group Topic: Topic Group    [address] [facility] 3 ACUTE BEHAV [facility]    [place], [address] [facility] GROUP DOCUMENTATION GROUP    Group Therapy Note    Attendees: 6       Attendance: Attended    Patient's Goal:  To participate in chair exercise routine    Interventions/techniques: Supported-benefits of exercise and other wellness tips    Follows Directions:  Followed directions    Interactions: Interacted appropriately    Mental Status: Calm    Behavior/appearance: Attentive, Cooperative, and Needed prompting    Goals Achieved: Able to engage in interactions, Able to listen to others, Able to self-disclose, and Discussed coping-completed exercise routine      Kulwant Baires

## 2023-04-25 NOTE — GROUP NOTE
[facility] GROUP DOCUMENTATION INDIVIDUAL                                                                          Group Therapy Note    Date: 4/25/2023    Group Start Time: 1400  Group End Time: 1500  Group Topic: Recreational/Music Therapy    [facility] 3 ACUTE BEHAV [facility]    Ariadna Caraballo  GROUP DOCUMENTATION GROUP    Group Therapy Note    Attendees: 7       Attendance: Did not attend      Ariadna Caraballo

## 2023-04-25 NOTE — BH NOTES
Psychiatric Progress Note    Patient: Kalyan Castro MRN: [id number]  SSN: [id number]    YOB: 1985  Age: 42 y.o. Sex: female      Admit Date: 4/21/2023    LOS: 4 days     Subjective:     Kalyan Castro  reports feeling better overall, but continues to have thoughts of suicide. Moods are depressed. Denies SI/HI/AH/VH. No aggression or violence. Appropriately interactive and aware. Tolerating medications well. Eating well and sleeping well (6 hrs). Tolerated Tylenol, Atarax and Zyprexa as needed. 04/24 - no acute overnight events. The patient is medication compliant, she slept 8 hours and is able to make her needs known. Patient medication compliant, and continues to report CAH and AH. PI is largely unchanged -- she continues to state that her  is dead. Patient denies active thoughts of self harm and is otherwise in fair behavioral control. She has been requesting anxiolytic agents (Klonopin) as well as stimulants for concentration. Discussed various contraindications for these agents including concomitant opioid use and stimulant abuse history, she voices understanding. 04/25 - the patient has been visible, she got PRN Zyprexa for psychosis and anxiety. She endorses SI without a plan, ongoing PI toward her  who she insists is dead despite both she and the team having spoken to him since admission. Patient medication compliant, she is anxious and reports worsening anxiety and withdrawal symptoms -- VSS otherwise and patient is in no apparent distress on interview. Patient actively paranoid and suspicious of her situation, but with no episodes of agitation or aggression. She is tolerating medication thus far.  Patient frequently requests benzodiazepines stating she has been on Klonopin for up to 3 years and is experiencing withdrawal.  shows Klonopin 1 mg BID filled monthly for at least 2 years and then tapered recently by her PCP at the same time gabapentin was prescribed more frequently. Of note, there are no Suboxone fills in the patient's  on review.     Objective:     Vitals:    04/23/23 2304 04/24/23 0840 04/24/23 2045 04/25/23 0723   BP: 121/77 112/72 108/65 123/77   Pulse: 64 78 65 74   Resp: 20 19 16 18   Temp: 97.5 °F (36.4 °C) 98 °F (36.7 °C) 98.1 °F (36.7 °C) 97.7 °F (36.5 °C)   SpO2: 98% 99% 98% 98%   Weight:       Height:            Mental Status Exam:   Sensorium  oriented to time, place and person   Relations cooperative   Eye Contact appropriate   Appearance:  age appropriate   Speech:  non-pressured and soft   Thought Process: goal directed   Thought Content AH, paranoid delusions   Suicidal ideations no plan  and contracts for safety   Mood:  depressed   Affect:  constricted   Memory   adequate   Concentration:  adequate   Insight:  poor   Judgment:  limited       MEDICATIONS:  Current Facility-Administered Medications   Medication Dose Route Frequency    gabapentin (NEURONTIN) capsule 300 mg  300 mg Oral BID    gabapentin (NEURONTIN) capsule 100 mg  100 mg Oral TID PRN    ARIPiprazole (ABILIFY) tablet 15 mg  15 mg Oral DAILY    naloxone (NARCAN) injection 0.4 mg  0.4 mg IntraMUSCular EVERY 2 MINUTES AS NEEDED    OLANZapine (ZyPREXA) tablet 5 mg  5 mg Oral Q6H PRN    haloperidol lactate (HALDOL) injection 5 mg  5 mg IntraMUSCular Q6H PRN    benztropine (COGENTIN) tablet 1 mg  1 mg Oral BID PRN    diphenhydrAMINE (BENADRYL) injection 50 mg  50 mg IntraMUSCular BID PRN    LORazepam (ATIVAN) injection 1 mg  1 mg IntraMUSCular Q4H PRN    traZODone (DESYREL) tablet 50 mg  50 mg Oral QHS PRN    acetaminophen (TYLENOL) tablet 650 mg  650 mg Oral Q4H PRN    magnesium hydroxide (MILK OF MAGNESIA) 400 mg/5 mL oral suspension 30 mL  30 mL Oral DAILY PRN    aspirin delayed-release tablet 81 mg  81 mg Oral DAILY    buprenorphine-naloxone (SUBOXONE) 8-2mg SL tablet  3 Tablet SubLINGual DAILY    ondansetron (ZOFRAN ODT) tablet 4 mg  4 mg Oral Q6H PRN    sertraline (ZOLOFT) tablet 200 mg  200 mg Oral DAILY      DISCUSSION:   the risks and benefits of the proposed medication  patient given opportunity to ask questions    Lab/Data Review: All lab results for the last 24 hours reviewed.      Recent Results (from the past 24 hour(s))   SARS-COV-2    Collection Time: 04/24/23 12:56 PM   Result Value Ref Range    Specimen source Nasopharyngeal      SARS-CoV-2 Not detected NOTD     LIPID PANEL    Collection Time: 04/25/23  5:27 AM   Result Value Ref Range    Cholesterol, total 186 <200 MG/DL    Triglyceride 194 (H) <150 MG/DL    HDL Cholesterol 41 MG/DL    LDL, calculated 106.2 (H) 0 - 100 MG/DL    VLDL, calculated 38.8 MG/DL    CHOL/HDL Ratio 4.5 0.0 - 5.0     TSH 3RD GENERATION    Collection Time: 04/25/23  5:27 AM   Result Value Ref Range    TSH 2.17 0.36 - 3.74 uIU/mL   HEMOGLOBIN A1C WITH EAG    Collection Time: 04/25/23  5:27 AM   Result Value Ref Range    Hemoglobin A1c 5.2 4.0 - 5.6 %    Est. 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)      Opioid use disorder, mild, abuse (HCC) (4/24/2023)        Plan:     - Collateral information  - CONTINUE Abilify 15 mg QDAY for psychosis, mood adjunct  - INCREASE Neurontin to 400 mg BID and 300 mg TID PRN anxiety  - CONTINUE Suboxone 24 mg SL QDAY for opioid use disorder  - CONTINUE Zoloft 200 mg QDAY for MDD  - Avoid concomitant benzo and opioid administration  - Disposition planning with social work    I certify that this patient's inpatient psychiatric hospital services furnished since the previous certification were, and continue to be, required for treatment that could reasonably be expected to improve the patient's condition, or for diagnostic study, and that the patient continues to need, on a daily basis, active treatment furnished directly by or requiring the supervision of inpatient psychiatric facility personnel. In addition, the hospital records show that services furnished were intensive treatment services, admission or related services, or equivalent services.     Signed By: Rufus Tracy MD     April 25, 2023

## 2023-04-25 NOTE — PROGRESS NOTES
Pt presents with flat affect and anxious mood. Pt restless. Endorsing anxiety and depression 10/10. Given PRN Gabapentin and Zyprexa this shift. Pt endorses Left foot pain 5/10. PRN Tylenol given. Pt makes delusional statements about  being dead. Pt also talks about lice and bed bugs. Pt has been checked for both and ensured she does not have lice nor bed bugs. Pt endorses SI with no plan. Denies HI. Endorses AH of voices telling her that she is \"no good. \" Pt often asking staff for snacks. Unit rules around snack times enforced. Pt continues to endorse anxiety throughout shift and ask for PRNs. Pt encouraged to attend group and work on word searches. VSS. NAD. Pt denies further needs at this time.      Problem: Suicide  Goal: *STG: Remains safe in hospital  Outcome: Progressing Towards Goal  Goal: *STG/LTG: Complies with medication therapy  Outcome: Progressing Towards Goal     Problem: Depressed Mood (Adult/Pediatric)  Goal: *STG: Demonstrates reduction in symptoms and increase in insight into coping skills/future focused  Outcome: Not Progressing Towards Goal

## 2023-04-25 NOTE — PROGRESS NOTES
Problem: Suicide  Goal: *STG: Remains safe in hospital  Outcome: Progressing Towards Goal  Goal: *STG: Seeks staff when feelings of self harm or harm towards others arise  Outcome: Progressing Towards Goal  Goal: *STG: Attends activities and groups  Outcome: Progressing Towards Goal  Goal: *STG:  Verbalizes alternative ways of dealing with maladaptive feelings/behaviors  Outcome: Progressing Towards Goal     Patient was received resting in bed, patient was calm and cooperative with care, endorses SI with no plan, denies HI/VAH, endorses Anxiety and depression at 10/10. Prn Tylenol, Gabapentin and trazodone given on request, rounding maintained. Lab work completed.  Patient slept for 5.15.

## 2023-04-25 NOTE — BH NOTES
Behavioral Health Treatment Team Note     Patient goal(s) for today: Take medications as prescribed, engage in unit activities Patient Continue taking meds as, participate in hygiene/ADLs, prepare for discharge, follow directions from staff, contact support team, attend groups   Treatment team focus/goals: will continue to maintain a calm and cooperative mood and communicate her needs with staff.  Continue taking meds as prescribed, engage in unit activities, participate in hygiene/ADLs, prepare for discharge, follow directions from staff, contact support team, attend groups     Progress note: Patient met with treatment team. Patient was alert and oriented x 4. Patient continues to endorse withdrawal symptoms. Patient reports that she is homeless at this time and is not able to return back to the place she was living. SW will continue to attempt to get into contact with . Patient continues to minimize substance abuse. Patient presents with a depressed mood, flat affect, and blocked thought process. SW will continue to work on disposition planning as well. Patient reports that she was supposed to follow up with outpatient provider Arsh Jovel, ROHAN looked up his information and did not find a contact number.     LOS:  4  Expected LOS: TBD    Insurance info/prescription coverage: [employer] BETTER [facility] [employer] BETTER [facility]  Date of last family contact:  attempted to contact  today   Family requesting physician contact today:  no  Discharge plan:  TBD  Guns in the home:  no   Outpatient provider(s):  to be linked     Participating treatment team members: Ciara Bates, billy Miller in social work, Fran Millard MD

## 2023-04-25 NOTE — BH NOTES
Psychiatric Progress Note    Patient: Ciara Bates MRN: [id number]  SSN: [id number]    YOB: 1985  Age: 37 y.o.  Sex: female      Admit Date: 4/21/2023    LOS: 4 days     Subjective:     Ciara Bates  reports feeling better overall, but continues to have thoughts of suicide.  Moods are depressed.  Denies SI/HI/AH/VH.  No aggression or violence.  Appropriately interactive and aware. Tolerating medications well.  Eating well and sleeping well (6 hrs).  Tolerated Tylenol, Atarax and Zyprexa as needed.    04/24 - no acute overnight events. The patient is medication compliant, she slept 8 hours and is able to make her needs known. Patient medication compliant, and continues to report CAH and AH. PI is largely unchanged -- she continues to state that her  is dead. Patient denies active thoughts of self harm and is otherwise in fair behavioral control. She has been requesting anxiolytic agents (Klonopin) as well as stimulants for concentration. Discussed various contraindications for these agents including concomitant opioid use and stimulant abuse history, she voices understanding.    04/25 - the patient has been visible, she got PRN Zyprexa for psychosis and anxiety. She endorses SI without a plan, ongoing PI toward her  who she insists is dead despite both she and the team having spoken to him since admission. Patient medication compliant, she is anxious and reports worsening anxiety and withdrawal symptoms -- VSS otherwise and patient is in no apparent distress on interview. Patient actively paranoid and suspicious of her situation, but with no episodes of agitation or aggression. She is tolerating medication thus far. Patient frequently requests benzodiazepines stating she has been on Klonopin for up to 3 years and is experiencing withdrawal.  shows Klonopin 1 mg BID filled monthly for at least 2 years and then tapered recently by her PCP at the same time gabapentin was  prescribed more frequently. Of note, there are no Suboxone fills in the patient's  on review.    Objective:     Vitals:    04/23/23 2304 04/24/23 0840 04/24/23 2045 04/25/23 0723   BP: 121/77 112/72 108/65 123/77   Pulse: 64 78 65 74   Resp: 20 19 16 18   Temp: 97.5 °F (36.4 °C) 98 °F (36.7 °C) 98.1 °F (36.7 °C) 97.7 °F (36.5 °C)   SpO2: 98% 99% 98% 98%   Weight:       Height:            Mental Status Exam:   Sensorium  oriented to time, place and person   Relations cooperative   Eye Contact appropriate   Appearance:  age appropriate   Speech:  non-pressured and soft   Thought Process: goal directed   Thought Content AH, paranoid delusions   Suicidal ideations no plan  and contracts for safety   Mood:  depressed   Affect:  constricted   Memory   adequate   Concentration:  adequate   Insight:  poor   Judgment:  limited       MEDICATIONS:  Current Facility-Administered Medications   Medication Dose Route Frequency    gabapentin (NEURONTIN) capsule 300 mg  300 mg Oral BID    gabapentin (NEURONTIN) capsule 100 mg  100 mg Oral TID PRN    ARIPiprazole (ABILIFY) tablet 15 mg  15 mg Oral DAILY    naloxone (NARCAN) injection 0.4 mg  0.4 mg IntraMUSCular EVERY 2 MINUTES AS NEEDED    OLANZapine (ZyPREXA) tablet 5 mg  5 mg Oral Q6H PRN    haloperidol lactate (HALDOL) injection 5 mg  5 mg IntraMUSCular Q6H PRN    benztropine (COGENTIN) tablet 1 mg  1 mg Oral BID PRN    diphenhydrAMINE (BENADRYL) injection 50 mg  50 mg IntraMUSCular BID PRN    LORazepam (ATIVAN) injection 1 mg  1 mg IntraMUSCular Q4H PRN    traZODone (DESYREL) tablet 50 mg  50 mg Oral QHS PRN    acetaminophen (TYLENOL) tablet 650 mg  650 mg Oral Q4H PRN    magnesium hydroxide (MILK OF MAGNESIA) 400 mg/5 mL oral suspension 30 mL  30 mL Oral DAILY PRN    aspirin delayed-release tablet 81 mg  81 mg Oral DAILY    buprenorphine-naloxone (SUBOXONE) 8-2mg SL tablet  3 Tablet SubLINGual DAILY    ondansetron (ZOFRAN ODT) tablet 4 mg  4 mg Oral Q6H PRN    sertraline  (ZOLOFT) tablet 200 mg  200 mg Oral DAILY      DISCUSSION:   the risks and benefits of the proposed medication  patient given opportunity to ask questions    Lab/Data Review:  All lab results for the last 24 hours reviewed.     Recent Results (from the past 24 hour(s))   SARS-COV-2    Collection Time: 04/24/23 12:56 PM   Result Value Ref Range    Specimen source Nasopharyngeal      SARS-CoV-2 Not detected NOTD     LIPID PANEL    Collection Time: 04/25/23  5:27 AM   Result Value Ref Range    Cholesterol, total 186 <200 MG/DL    Triglyceride 194 (H) <150 MG/DL    HDL Cholesterol 41 MG/DL    LDL, calculated 106.2 (H) 0 - 100 MG/DL    VLDL, calculated 38.8 MG/DL    CHOL/HDL Ratio 4.5 0.0 - 5.0     TSH 3RD GENERATION    Collection Time: 04/25/23  5:27 AM   Result Value Ref Range    TSH 2.17 0.36 - 3.74 uIU/mL   HEMOGLOBIN A1C WITH EAG    Collection Time: 04/25/23  5:27 AM   Result Value Ref Range    Hemoglobin A1c 5.2 4.0 - 5.6 %    Est. average glucose 103 mg/dL         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)      Opioid use disorder, mild, abuse ([facility]) (4/24/2023)        Plan:     - Collateral information  - CONTINUE Abilify 15 mg QDAY for psychosis, mood adjunct  - INCREASE Neurontin to 400 mg BID and 300 mg TID PRN anxiety  - CONTINUE Suboxone 24 mg SL QDAY for opioid use disorder  - CONTINUE Zoloft 200 mg QDAY for MDD  - Avoid concomitant benzo and opioid administration  - Disposition planning with social work    I certify that this patient's inpatient psychiatric hospital services furnished since the previous certification were, and continue to be, required for treatment that could reasonably be expected to improve the patient's condition, or for diagnostic study, and that the patient continues to need, on a daily basis, active treatment furnished directly by or requiring the supervision of inpatient psychiatric  facility personnel. In addition, the hospital records show that services furnished were intensive treatment services, admission or related services, or equivalent services.    Signed By: Moses Millard MD     April 25, 2023

## 2023-04-25 NOTE — PROGRESS NOTES
Pt presents with flat affect and anxious mood. Pt restless. Endorsing anxiety and depression 10/10. Given PRN Gabapentin and Zyprexa this shift. Pt endorses Left foot pain 5/10. PRN Tylenol given. Pt makes delusional statements about  being dead. Pt also talks about lice and bed bugs. Pt has been checked for both and ensured she does not have lice nor bed bugs. Pt endorses SI with no plan. Denies HI. Endorses AH of voices telling her that she is \"no good.\" Pt often asking staff for snacks. Unit rules around snack times enforced. Pt continues to endorse anxiety throughout shift and ask for PRNs. Pt encouraged to attend group and work on word searches. VSS. NAD. Pt denies further needs at this time.     Problem: Suicide  Goal: *STG: Remains safe in hospital  Outcome: Progressing Towards Goal  Goal: *STG/LTG: Complies with medication therapy  Outcome: Progressing Towards Goal     Problem: Depressed Mood (Adult/Pediatric)  Goal: *STG: Demonstrates reduction in symptoms and increase in insight into coping skills/future focused  Outcome: Not Progressing Towards Goal

## 2023-04-25 NOTE — GROUP NOTE
[facility]  GROUP DOCUMENTATION INDIVIDUAL                                                                          Group Therapy Note    Date: 4/25/2023    Group Start Time: 1000  Group End Time: 1100  Group Topic: Topic Group    [facility] 3 ACUTE BEHAV [facility]    Ariadna Caraballo  GROUP DOCUMENTATION GROUP    Group Therapy Note    Attendees: 6       Attendance: Attended    Patient's Goal:  To participate in chair exercise routine    Interventions/techniques: Supported-benefits of exercise and other wellness tips    Follows Directions: Followed directions    Interactions: Interacted appropriately    Mental Status: Calm    Behavior/appearance: Attentive, Cooperative, and Needed prompting    Goals Achieved: Able to engage in interactions, Able to listen to others, Able to self-disclose, and Discussed coping-completed exercise routine      Ariadna Caraballo

## 2023-04-26 PROCEDURE — 99232 SBSQ HOSP IP/OBS MODERATE 35: CPT | Performed by: PSYCHIATRY & NEUROLOGY

## 2023-04-26 PROCEDURE — 65220000003 HC RM SEMIPRIVATE PSYCH

## 2023-04-26 PROCEDURE — 74011250637 HC RX REV CODE- 250/637: Performed by: NURSE PRACTITIONER

## 2023-04-26 PROCEDURE — 74011250636 HC RX REV CODE- 250/636: Performed by: PSYCHIATRY & NEUROLOGY

## 2023-04-26 PROCEDURE — 74011250637 HC RX REV CODE- 250/637: Performed by: PSYCHIATRY & NEUROLOGY

## 2023-04-26 PROCEDURE — 90833 PSYTX W PT W E/M 30 MIN: CPT | Performed by: PSYCHIATRY & NEUROLOGY

## 2023-04-26 RX ADMIN — BUPRENORPHINE HYDROCHLORIDE AND NALOXONE HYDROCHLORIDE DIHYDRATE 3 TABLET: 8; 2 TABLET SUBLINGUAL at 08:20

## 2023-04-26 RX ADMIN — OLANZAPINE 5 MG: 5 TABLET, FILM COATED ORAL at 12:24

## 2023-04-26 RX ADMIN — ACETAMINOPHEN 650 MG: 325 TABLET ORAL at 12:24

## 2023-04-26 RX ADMIN — MENTHOL, METHYL SALICYLATE: 10; 15 CREAM TOPICAL at 17:21

## 2023-04-26 RX ADMIN — OLANZAPINE 5 MG: 5 TABLET, FILM COATED ORAL at 21:04

## 2023-04-26 RX ADMIN — GABAPENTIN 400 MG: 100 CAPSULE ORAL at 08:19

## 2023-04-26 RX ADMIN — GABAPENTIN 400 MG: 100 CAPSULE ORAL at 11:07

## 2023-04-26 RX ADMIN — ACETAMINOPHEN 650 MG: 325 TABLET ORAL at 08:20

## 2023-04-26 RX ADMIN — ASPIRIN 81 MG: 81 TABLET, COATED ORAL at 08:19

## 2023-04-26 RX ADMIN — ACETAMINOPHEN 650 MG: 325 TABLET ORAL at 01:03

## 2023-04-26 RX ADMIN — ACETAMINOPHEN 650 MG: 325 TABLET ORAL at 16:05

## 2023-04-26 RX ADMIN — GABAPENTIN 400 MG: 300 CAPSULE ORAL at 01:02

## 2023-04-26 RX ADMIN — ARIPIPRAZOLE 15 MG: 15 TABLET ORAL at 08:19

## 2023-04-26 RX ADMIN — ACETAMINOPHEN 650 MG: 325 TABLET ORAL at 05:05

## 2023-04-26 RX ADMIN — ACETAMINOPHEN 650 MG: 325 TABLET ORAL at 21:04

## 2023-04-26 RX ADMIN — SERTRALINE HYDROCHLORIDE 200 MG: 50 TABLET ORAL at 08:19

## 2023-04-26 RX ADMIN — GABAPENTIN 400 MG: 100 CAPSULE ORAL at 16:05

## 2023-04-26 NOTE — PROGRESS NOTES
Pt visible in hallway this morning. Often complaining of anxiety and foot pain. States that she feels better after speaking with her  as she stated that she thought he was dead. Compliant with medications. Given PRN Tylenol for L foot pain 9/10. Endorses SI with no plan. Denies HI/AVH. Although this morning she states she was hearing voices that tell her \"negative things. \" Rates her anxiety 9/10. Depression 8/10. Pt stated that doing word searches helps her anxiety. Pt has been given several word searches to work on. Pt denies further needs at this time.      Problem: Suicide  Goal: *STG: Remains safe in hospital  Outcome: Progressing Towards Goal  Goal: *STG/LTG: Complies with medication therapy  Outcome: Progressing Towards Goal     Problem: Suicide  Goal: *STG:  Verbalizes alternative ways of dealing with maladaptive feelings/behaviors  Outcome: Not Progressing Towards Goal

## 2023-04-26 NOTE — PROGRESS NOTES
Pt visible in hallway this morning. Often complaining of anxiety and foot pain. States that she feels better after speaking with her  as she stated that she thought he was dead. Compliant with medications. Given PRN Tylenol for L foot pain 9/10. Endorses SI with no plan. Denies HI/AVH. Although this morning she states she was hearing voices that tell her \"negative things.\" Rates her anxiety 9/10. Depression 8/10. Pt stated that doing word searches helps her anxiety. Pt has been given several word searches to work on. Pt denies further needs at this time.     Problem: Suicide  Goal: *STG: Remains safe in hospital  Outcome: Progressing Towards Goal  Goal: *STG/LTG: Complies with medication therapy  Outcome: Progressing Towards Goal     Problem: Suicide  Goal: *STG:  Verbalizes alternative ways of dealing with maladaptive feelings/behaviors  Outcome: Not Progressing Towards Goal

## 2023-04-26 NOTE — GROUP NOTE
[facility] GROUP DOCUMENTATION INDIVIDUAL                                                                          Group Therapy Note    Date: 4/26/2023    Group Start Time: 1100  Group End Time: 1132  Group Topic: Activity Therapy    [facility] BEHAVIORAL [facility]    Ross Gerard GROUP DOCUMENTATION GROUP    Group Therapy Note    Attendees: 6       Attendance: Attended    Patient's Goal:  Art    Interventions/techniques: Art integration    Follows Directions: Followed directions    Interactions: Interacted appropriately    Mental Status: Calm and Congruent    Behavior/appearance: Attentive and Cooperative    Goals Achieved: Able to engage in interactions and Able to listen to others    Ross Gerard

## 2023-04-26 NOTE — GROUP NOTE
[facility] GROUP DOCUMENTATION INDIVIDUAL                                                                          Group Therapy Note    Date: 4/26/2023    Group Start Time: 1100  Group End Time: 8097  Group Topic: Activity Therapy    [address]    [address] [facility] GROUP DOCUMENTATION GROUP    Group Therapy Note    Attendees: 6       Attendance: Attended    Patient's Goal:  Art    Interventions/techniques: Art integration    Follows Directions:  Followed directions    Interactions: Interacted appropriately    Mental Status: Calm and Congruent    Behavior/appearance: Attentive and Cooperative    Goals Achieved: Able to engage in interactions and Able to listen to others    Leonard Hauser

## 2023-04-26 NOTE — BH NOTES
Behavioral Health Treatment Team Note         Progress note: Patient met with treatment team. Patient continues to present with a confused mood, flat affect, and delusional thought process. Patient continues to insist that her  was shot in the back of his head. Treatment actually contacted  Meryle Fought via phone call today and confirmed that he is alive. Patient's  reports that this presentation continues to happen and she was discharged from [facility]. MD will increase anti-psychotic. SW will continue to work on building rapport and observing patient on the unit to assess for medication seeking behaviors. SW will also contact  to get more information.      LOS:  5  Expected LOS: TBD    Insurance info/prescription coverage: [id number]   Date of last family contact:  Meryle Fought ( [phone])  Family requesting physician contact today:  no  Discharge plan:  home   Guns in the home:  no   Outpatient provider(s):  to be linked     Participating treatment team members: Truong Barron, supervisee in social work, Merissa Benavides MD

## 2023-04-26 NOTE — BH NOTES
PSYCHOTHERAPY SESSION NOTE     Patient Name  Ciara Bates   Date of Birth 1985   CSN [id number]   Medical Record Number  [id number]      Age  37 y.o.   PCP Carmella Shaw NP   Admit date:  4/21/2023    Room Number  311/02  @ [facility]   Date of Service  4/26/2023            Length of psychotherapy session: 30 minutes    Main condition/diagnosis/issues treated during session today, 4/26/2023 : psychosis    I employed Cognitive Behavioral therapy techniques, Reality-Oriented psychotherapy, as well as supportive psychotherapy in regards to various ongoing psychosocial stressors, including the following: pre-admission and current problems; housing issues; occupational issues; academic issues; legal issues; medical issues; and stress of hospitalization. Interpersonal relationship issues and psychodynamic conflicts explored.  Attempts made to alleviate maladaptive patterns. We, also, worked on issues of denial & effects of substance dependency/use.  Amrit joined session to help with reality testing. Session focused on the patient's ongoing delusional state and the effect it is having on her mental state.     Overall, patient is not progressing.    Treatment Plan Update (reviewed and updated 4/26/2023) : I will modify psychotherapy tx plan by implementing more stress management strategies, building upon cognitive behavioral techniques, increasing coping skills, as well as shoring up psychological defenses).    An extended energy and skill set was needed to engage pt in psychotherapy due to some of the following: resistiveness, complexity, negativity, confrontational nature, hostile behaviors, and/or severe abnormalities in thought processes/psychosis resulting in the loss of expressive/receptive language communication skills.

## 2023-04-26 NOTE — PROGRESS NOTES
Pt continues to endorse pain in L ankle/foot 10/10 pain. No noticeable bruising or swelling. Pt claims to have fell prior to coming to ED. Provider notified. Pt has requested and has been given PRN Tylenol Q4 as ordered. Ice pack given. Bengay cream applied as ordered. Xray L foot ordered. Pt awaiting to go down to Xray to complete order.

## 2023-04-26 NOTE — BH NOTES
PSYCHOTHERAPY SESSION NOTE     Patient Name  Artem Valiente   Date of Birth 1985   [facility] [id number]   Medical Record Number  [id number]      Age  42 y.o. PCP Blas Ramos., NP   Admit date:  4/21/2023    Room Number  233/11  @ [facility]   Date of Service  4/26/2023            Length of psychotherapy session: 30 minutes    Main condition/diagnosis/issues treated during session today, 4/26/2023 : psychosis    I employed Cognitive Behavioral therapy techniques, Reality-Oriented psychotherapy, as well as supportive psychotherapy in regards to various ongoing psychosocial stressors, including the following: pre-admission and current problems; housing issues; occupational issues; academic issues; legal issues; medical issues; and stress of hospitalization. Interpersonal relationship issues and psychodynamic conflicts explored. Attempts made to alleviate maladaptive patterns. We, also, worked on issues of denial & effects of substance dependency/use.  Sue Nieves joined session to help with reality testing. Session focused on the patient's ongoing delusional state and the effect it is having on her mental state. Overall, patient is not progressing. Treatment Plan Update (reviewed and updated 4/26/2023) : I will modify psychotherapy tx plan by implementing more stress management strategies, building upon cognitive behavioral techniques, increasing coping skills, as well as shoring up psychological defenses). An extended energy and skill set was needed to engage pt in psychotherapy due to some of the following: resistiveness, complexity, negativity, confrontational nature, hostile behaviors, and/or severe abnormalities in thought processes/psychosis resulting in the loss of expressive/receptive language communication skills.

## 2023-04-26 NOTE — BH NOTES
Behavioral Health Treatment Team Note         Progress note: Patient met with treatment team. Patient continues to present with a confused mood, flat affect, and delusional thought process. Patient continues to insist that her  was shot in the back of his head. Treatment actually contacted  Amrit via phone call today and confirmed that he is alive. Patient's  reports that this presentation continues to happen and she was discharged from previous [facility] to [facility]. MD will increase anti-psychotic. SW will continue to work on building rapport and observing patient on the unit to assess for medication seeking behaviors. SW will also contact  to get more information.     LOS:  5  Expected LOS: TBD    Insurance info/prescription coverage: [employer] [facility] [employer] [facility]   Date of last family contact:  Amrit ( [phone])  Family requesting physician contact today:  no  Discharge plan:  home   Guns in the home:  no   Outpatient provider(s):  to be linked     Participating treatment team members: Elmo Franco, supervisee in social work, Fran Millard MD

## 2023-04-26 NOTE — GROUP NOTE
[facility]  GROUP DOCUMENTATION INDIVIDUAL                                                                          Group Therapy Note    Date: 4/26/2023    Group Start Time: 0830  Group End Time: 0848  Group Topic: Community Meeting    [facility] BEHAVIORAL [facility]    Ross Gerard; Mauricio Gonzalez  GROUP DOCUMENTATION GROUP    Group Therapy Note    Attendees: 3       Attendance: Did not attend    Ross Gerard

## 2023-04-26 NOTE — PROGRESS NOTES
Pt was met in her room where she was observed resting but easily arouse when her named was called out to her. Pt was observed to be isolative to her room during this shift. She came out of room for snack and to make her needs known. Pt did not display any signs of distress or complaints of adverse reactions to medications. Pt denies HI, AVH, rates depression and anxiety both 10/10. Pt reports pain 9/10 in left ankle to be aching. Pt endorses SI thoughts without a plan. Pt stated that she feels safe here in the hospital and can contract for safety while her in the hospital. Pt stated that her mood is neutral. Pt was calm and cooperative during assessment. Pt will continue to be monitored for her safety. Pt slept for a total of 4.5 hrs. Problem: Suicide  Goal: *STG: Remains safe in hospital  Outcome: Progressing Towards Goal  Goal: *STG/LTG: Complies with medication therapy  Outcome: Progressing Towards Goal     Problem: Patient Education: Go to Patient Education Activity  Goal: Patient/Family Education  Outcome: Progressing Towards Goal     Problem: Falls - Risk of  Goal: *Absence of Falls  Description: Document Ridge Jacobo Fall Risk and appropriate interventions in the flowsheet.   Outcome: Progressing Towards Goal  Note: Fall Risk Interventions:       Mentation Interventions: Reorient patient    Medication Interventions: Teach patient to arise slowly      Problem: Depressed Mood (Adult/Pediatric)  Goal: *STG: Remains safe in hospital  Outcome: Progressing Towards Goal  Goal: *STG: Complies with medication therapy  Outcome: Progressing Towards Goal

## 2023-04-26 NOTE — GROUP NOTE
[facility]  GROUP DOCUMENTATION INDIVIDUAL                                                                          Group Therapy Note    Date: 4/26/2023    Group Start Time: 0830  Group End Time: 0848  Group Topic: Community Meeting    [address] [place] [address] Manuela; Mauricio Gonzalez     [address] [facility] GROUP    Group Therapy Note    Attendees: 3       Attendance: Did not attend    Blade Cervantes

## 2023-04-26 NOTE — BH NOTES
Psychiatric Progress Note    Patient: Ciara Bates MRN: [id number]  SSN: [id number]    YOB: 1985  Age: 37 y.o.  Sex: female      Admit Date: 4/21/2023    LOS: 5 days     Subjective:     Ciara Bates  reports feeling better overall, but continues to have thoughts of suicide.  Moods are depressed.  Denies SI/HI/AH/VH.  No aggression or violence.  Appropriately interactive and aware. Tolerating medications well.  Eating well and sleeping well (6 hrs).  Tolerated Tylenol, Atarax and Zyprexa as needed.    04/24 - no acute overnight events. The patient is medication compliant, she slept 8 hours and is able to make her needs known. Patient medication compliant, and continues to report CAH and AH. PI is largely unchanged -- she continues to state that her  is dead. Patient denies active thoughts of self harm and is otherwise in fair behavioral control. She has been requesting anxiolytic agents (Klonopin) as well as stimulants for concentration. Discussed various contraindications for these agents including concomitant opioid use and stimulant abuse history, she voices understanding.    04/25 - the patient has been visible, she got PRN Zyprexa for psychosis and anxiety. She endorses SI without a plan, ongoing PI toward her  who she insists is dead despite both she and the team having spoken to him since admission. Patient medication compliant, she is anxious and reports worsening anxiety and withdrawal symptoms -- VSS otherwise and patient is in no apparent distress on interview. Patient actively paranoid and suspicious of her situation, but with no episodes of agitation or aggression. She is tolerating medication thus far. Patient frequently requests benzodiazepines stating she has been on Klonopin for up to 3 years and is experiencing withdrawal.  shows Klonopin 1 mg BID filled monthly for at least 2 years and then tapered recently by her PCP at the same time gabapentin was  prescribed more frequently. Of note, there are no Suboxone fills in the patient's  on review.     - the patient remains delusional, she is complaining about 'lice and bed bugs' per RN and reports that her  is . She endorses AH and is anxious appearing. Patient slept 4.5 hours overnight and got Tylenol and Gabapentin PRN. Patient has been unable to reality test. Patient reports that her  was buried yesterday after having been shot though the team has not been able to confirm this. Patient medication compliant, still seeking benzodiazepines. Patient with no physical symptoms of benzodiazepine withdrawal thus far. She voices understanding of the situation but is otherwise in fair behavioral control.     Objective:     Vitals:    23 0723 23 0846   BP: 123/77  125/78 115/76   Pulse: 74  78 71   Resp: 18 18 16 15   Temp: 97.7 °F (36.5 °C)  97.6 °F (36.4 °C) 98.2 °F (36.8 °C)   SpO2: 98%  100% 100%   Weight:       Height:            Mental Status Exam:   Sensorium  oriented to time, place and person   Relations cooperative   Eye Contact appropriate   Appearance:  age appropriate   Speech:  non-pressured and soft   Thought Process: goal directed   Thought Content AH, paranoid delusions   Suicidal ideations no plan  and contracts for safety   Mood:  depressed   Affect:  constricted   Memory   adequate   Concentration:  adequate   Insight:  poor   Judgment:  limited       MEDICATIONS:  Current Facility-Administered Medications   Medication Dose Route Frequency    gabapentin (NEURONTIN) capsule 400 mg  400 mg Oral TID    gabapentin (NEURONTIN) capsule 400 mg  400 mg Oral TID PRN    ARIPiprazole (ABILIFY) tablet 15 mg  15 mg Oral DAILY    naloxone (NARCAN) injection 0.4 mg  0.4 mg IntraMUSCular EVERY 2 MINUTES AS NEEDED    OLANZapine (ZyPREXA) tablet 5 mg  5 mg Oral Q6H PRN    haloperidol lactate (HALDOL) injection 5 mg  5 mg IntraMUSCular Q6H PRN     benztropine (COGENTIN) tablet 1 mg  1 mg Oral BID PRN    diphenhydrAMINE (BENADRYL) injection 50 mg  50 mg IntraMUSCular BID PRN    LORazepam (ATIVAN) injection 1 mg  1 mg IntraMUSCular Q4H PRN    traZODone (DESYREL) tablet 50 mg  50 mg Oral QHS PRN    acetaminophen (TYLENOL) tablet 650 mg  650 mg Oral Q4H PRN    magnesium hydroxide (MILK OF MAGNESIA) 400 mg/5 mL oral suspension 30 mL  30 mL Oral DAILY PRN    aspirin delayed-release tablet 81 mg  81 mg Oral DAILY    buprenorphine-naloxone (SUBOXONE) 8-2mg SL tablet  3 Tablet SubLINGual DAILY    ondansetron (ZOFRAN ODT) tablet 4 mg  4 mg Oral Q6H PRN    sertraline (ZOLOFT) tablet 200 mg  200 mg Oral DAILY      DISCUSSION:   the risks and benefits of the proposed medication  patient given opportunity to ask questions    Lab/Data Review:  All lab results for the last 24 hours reviewed.     No results found for this or any previous visit (from the past 24 hour(s)).        Assessment:     Principal Problem:    Psychosis ([facility]) (4/21/2023)    Active Problems:    Amphetamine use disorder, moderate ([facility]) (4/24/2023)      Opioid use disorder, mild, abuse ([facility]) (4/24/2023)        Plan:     - Collateral information  - INCREASE Abilify to 20 mg QDAY for psychosis, mood adjunct  - CONTINUE Neurontin 400 mg BID and 300 mg TID PRN anxiety  - CONTINUE Suboxone 24 mg SL QDAY for opioid use disorder  - CONTINUE Zoloft 200 mg QDAY for MDD  - Avoid concomitant benzo and opioid administration  - Disposition planning with social work    I certify that this patient's inpatient psychiatric hospital services furnished since the previous certification were, and continue to be, required for treatment that could reasonably be expected to improve the patient's condition, or for diagnostic study, and that the patient continues to need, on a daily basis, active treatment furnished directly by or requiring the supervision of inpatient psychiatric facility personnel. In addition, the hospital  records show that services furnished were intensive treatment services, admission or related services, or equivalent services.    Signed By: Moses Millard MD     April 26, 2023

## 2023-04-26 NOTE — BH NOTES
Psychiatric Progress Note    Patient: Ayo Hilton MRN: [id number]  SSN: [id number]    YOB: 1985  Age: 42 y.o. Sex: female      Admit Date: 4/21/2023    LOS: 5 days     Subjective:     Ayo Hilton  reports feeling better overall, but continues to have thoughts of suicide. Moods are depressed. Denies SI/HI/AH/VH. No aggression or violence. Appropriately interactive and aware. Tolerating medications well. Eating well and sleeping well (6 hrs). Tolerated Tylenol, Atarax and Zyprexa as needed. 04/24 - no acute overnight events. The patient is medication compliant, she slept 8 hours and is able to make her needs known. Patient medication compliant, and continues to report CAH and AH. PI is largely unchanged -- she continues to state that her  is dead. Patient denies active thoughts of self harm and is otherwise in fair behavioral control. She has been requesting anxiolytic agents (Klonopin) as well as stimulants for concentration. Discussed various contraindications for these agents including concomitant opioid use and stimulant abuse history, she voices understanding. 04/25 - the patient has been visible, she got PRN Zyprexa for psychosis and anxiety. She endorses SI without a plan, ongoing PI toward her  who she insists is dead despite both she and the team having spoken to him since admission. Patient medication compliant, she is anxious and reports worsening anxiety and withdrawal symptoms -- VSS otherwise and patient is in no apparent distress on interview. Patient actively paranoid and suspicious of her situation, but with no episodes of agitation or aggression. She is tolerating medication thus far.  Patient frequently requests benzodiazepines stating she has been on Klonopin for up to 3 years and is experiencing withdrawal.  shows Klonopin 1 mg BID filled monthly for at least 2 years and then tapered recently by her PCP at the same time gabapentin was prescribed more frequently. Of note, there are no Suboxone fills in the patient's  on review.  - the patient remains delusional, she is complaining about 'lice and bed bugs' per RN and reports that her  is . She endorses AH and is anxious appearing. Patient slept 4.5 hours overnight and got Tylenol and Gabapentin PRN. Patient has been unable to reality test. Patient reports that her  was buried yesterday after having been shot though the team has not been able to confirm this. Patient medication compliant, still seeking benzodiazepines. Patient with no physical symptoms of benzodiazepine withdrawal thus far. She voices understanding of the situation but is otherwise in fair behavioral control.      Objective:     Vitals:    23 0723 23 0846   BP: 123/77  125/78 115/76   Pulse: 74  78 71   Resp: 18 18 16 15   Temp: 97.7 °F (36.5 °C)  97.6 °F (36.4 °C) 98.2 °F (36.8 °C)   SpO2: 98%  100% 100%   Weight:       Height:            Mental Status Exam:   Sensorium  oriented to time, place and person   Relations cooperative   Eye Contact appropriate   Appearance:  age appropriate   Speech:  non-pressured and soft   Thought Process: goal directed   Thought Content AH, paranoid delusions   Suicidal ideations no plan  and contracts for safety   Mood:  depressed   Affect:  constricted   Memory   adequate   Concentration:  adequate   Insight:  poor   Judgment:  limited       MEDICATIONS:  Current Facility-Administered Medications   Medication Dose Route Frequency    gabapentin (NEURONTIN) capsule 400 mg  400 mg Oral TID    gabapentin (NEURONTIN) capsule 400 mg  400 mg Oral TID PRN    ARIPiprazole (ABILIFY) tablet 15 mg  15 mg Oral DAILY    naloxone (NARCAN) injection 0.4 mg  0.4 mg IntraMUSCular EVERY 2 MINUTES AS NEEDED    OLANZapine (ZyPREXA) tablet 5 mg  5 mg Oral Q6H PRN    haloperidol lactate (HALDOL) injection 5 mg  5 mg IntraMUSCular Q6H PRN benztropine (COGENTIN) tablet 1 mg  1 mg Oral BID PRN    diphenhydrAMINE (BENADRYL) injection 50 mg  50 mg IntraMUSCular BID PRN    LORazepam (ATIVAN) injection 1 mg  1 mg IntraMUSCular Q4H PRN    traZODone (DESYREL) tablet 50 mg  50 mg Oral QHS PRN    acetaminophen (TYLENOL) tablet 650 mg  650 mg Oral Q4H PRN    magnesium hydroxide (MILK OF MAGNESIA) 400 mg/5 mL oral suspension 30 mL  30 mL Oral DAILY PRN    aspirin delayed-release tablet 81 mg  81 mg Oral DAILY    buprenorphine-naloxone (SUBOXONE) 8-2mg SL tablet  3 Tablet SubLINGual DAILY    ondansetron (ZOFRAN ODT) tablet 4 mg  4 mg Oral Q6H PRN    sertraline (ZOLOFT) tablet 200 mg  200 mg Oral DAILY      DISCUSSION:   the risks and benefits of the proposed medication  patient given opportunity to ask questions    Lab/Data Review: All lab results for the last 24 hours reviewed. No results found for this or any previous visit (from the past 24 hour(s)). Assessment:     Principal Problem:    Psychosis ([facility] [address] (4/21/2023)    Active Problems:    Amphetamine use disorder, moderate (HCC) (4/24/2023)      Opioid use disorder, mild, abuse ([facility] [address] (4/24/2023)        Plan:     - Collateral information  - INCREASE Abilify to 20 mg QDAY for psychosis, mood adjunct  - CONTINUE Neurontin 400 mg BID and 300 mg TID PRN anxiety  - CONTINUE Suboxone 24 mg SL QDAY for opioid use disorder  - CONTINUE Zoloft 200 mg QDAY for MDD  - Avoid concomitant benzo and opioid administration  - Disposition planning with social work    I certify that this patient's inpatient psychiatric hospital services furnished since the previous certification were, and continue to be, required for treatment that could reasonably be expected to improve the patient's condition, or for diagnostic study, and that the patient continues to need, on a daily basis, active treatment furnished directly by or requiring the supervision of inpatient psychiatric facility personnel.  In addition, the hospital records show that services furnished were intensive treatment services, admission or related services, or equivalent services.     Signed By: Kendy Tolliver MD     April 26, 2023

## 2023-04-27 ENCOUNTER — APPOINTMENT (OUTPATIENT)
Dept: GENERAL RADIOLOGY | Age: 38
DRG: 751 | End: 2023-04-27
Attending: PSYCHIATRY & NEUROLOGY
Payer: COMMERCIAL

## 2023-04-27 PROCEDURE — 74011250637 HC RX REV CODE- 250/637: Performed by: PSYCHIATRY & NEUROLOGY

## 2023-04-27 PROCEDURE — 74011250636 HC RX REV CODE- 250/636: Performed by: PSYCHIATRY & NEUROLOGY

## 2023-04-27 PROCEDURE — 65220000003 HC RM SEMIPRIVATE PSYCH

## 2023-04-27 PROCEDURE — 99232 SBSQ HOSP IP/OBS MODERATE 35: CPT | Performed by: PSYCHIATRY & NEUROLOGY

## 2023-04-27 PROCEDURE — 73610 X-RAY EXAM OF ANKLE: CPT

## 2023-04-27 PROCEDURE — 74011250637 HC RX REV CODE- 250/637: Performed by: NURSE PRACTITIONER

## 2023-04-27 RX ORDER — BUPRENORPHINE HYDROCHLORIDE AND NALOXONE HYDROCHLORIDE DIHYDRATE 8; 2 MG/1; MG/1
2 TABLET SUBLINGUAL DAILY
Status: DISCONTINUED | OUTPATIENT
Start: 2023-04-28 | End: 2023-04-28

## 2023-04-27 RX ADMIN — ACETAMINOPHEN 650 MG: 325 TABLET ORAL at 04:48

## 2023-04-27 RX ADMIN — ACETAMINOPHEN 650 MG: 325 TABLET ORAL at 11:47

## 2023-04-27 RX ADMIN — OLANZAPINE 5 MG: 5 TABLET, FILM COATED ORAL at 15:54

## 2023-04-27 RX ADMIN — MAGNESIUM HYDROXIDE 30 ML: 400 SUSPENSION ORAL at 13:02

## 2023-04-27 RX ADMIN — SERTRALINE HYDROCHLORIDE 200 MG: 50 TABLET ORAL at 08:08

## 2023-04-27 RX ADMIN — ARIPIPRAZOLE 20 MG: 15 TABLET ORAL at 08:08

## 2023-04-27 RX ADMIN — GABAPENTIN 400 MG: 300 CAPSULE ORAL at 21:01

## 2023-04-27 RX ADMIN — GABAPENTIN 400 MG: 100 CAPSULE ORAL at 08:08

## 2023-04-27 RX ADMIN — ACETAMINOPHEN 650 MG: 325 TABLET ORAL at 18:04

## 2023-04-27 RX ADMIN — BUPRENORPHINE HYDROCHLORIDE AND NALOXONE HYDROCHLORIDE DIHYDRATE 3 TABLET: 8; 2 TABLET SUBLINGUAL at 08:08

## 2023-04-27 RX ADMIN — TRAZODONE HYDROCHLORIDE 50 MG: 50 TABLET ORAL at 21:01

## 2023-04-27 RX ADMIN — GABAPENTIN 400 MG: 100 CAPSULE ORAL at 16:54

## 2023-04-27 RX ADMIN — MENTHOL, METHYL SALICYLATE: 10; 15 CREAM TOPICAL at 18:25

## 2023-04-27 RX ADMIN — MENTHOL, METHYL SALICYLATE: 10; 15 CREAM TOPICAL at 08:09

## 2023-04-27 RX ADMIN — ACETAMINOPHEN 650 MG: 325 TABLET ORAL at 08:08

## 2023-04-27 RX ADMIN — GABAPENTIN 400 MG: 100 CAPSULE ORAL at 11:46

## 2023-04-27 NOTE — BH NOTES
Behavioral Health Treatment Team Note         Progress note: Patient met with treatment team. Patient continues to present with a disorganized thought process. Patient continues to seek medications. Patient reports that she is homeless and needs a place to live. SW will speak with patient about attending a substance abuse program. Patient reports that she no longer lives with .     LOS:  6  Expected LOS: TBD    Insurance info/prescription coverage[place] [employer] [facility] [place] [employer] [facility] [place]   Date of last family contact:  Amrit ( [phone])  Family requesting physician contact today:  no  Discharge plan:  home   Guns in the home:  no   Outpatient provider(s):  to be linked      Participating treatment team members: Elmo Franco, supervisee in social work, Fran Millard MD

## 2023-04-27 NOTE — GROUP NOTE
MYNOR  GROUP DOCUMENTATION INDIVIDUAL                                                                          Group Therapy Note    Date: 4/27/2023    Group Start Time: 1400  Group End Time: 1500  Group Topic: Recreational/Music Therapy    [facility] 3 ACUTE BEHAV [facility]    Ariadna Caraballo  GROUP DOCUMENTATION GROUP    Group Therapy Note    Attendees: 5       Attendance: Attended    Patient's Goal:  To concentrate on selected task    Interventions/techniques: Supported-crafts,games,music    Follows Directions: Followed directions    Interactions: Interacted appropriately    Mental Status: Calm and Flat    Behavior/appearance: Attentive and Cooperative    Goals Achieved: Able to engage in interactions and Able to listen to others-worked on selected task      Ariadna Caraballo

## 2023-04-27 NOTE — BH NOTES
Behavioral Health Treatment Team Note         Progress note: Patient met with treatment team. Patient continues to present with a disorganized thought process. Patient continues to seek medications. Patient reports that she is homeless and needs a place to live. SW will speak with patient about attending a substance abuse program. Patient reports that she no longer lives with .      LOS:  6  Expected LOS: TBD    Insurance info/prescription coverage: [id number]   Date of last family contact:  Scotty November ( [phone])  Family requesting physician contact today:  no  Discharge plan:  home   Guns in the home:  no   Outpatient provider(s):  to be linked      Participating treatment team members: Tila Castilloor, supervisee in social work, Des Soliz MD

## 2023-04-27 NOTE — GROUP NOTE
MYNOR  GROUP DOCUMENTATION INDIVIDUAL                                                                          Group Therapy Note    Date: 4/27/2023    Group Start Time: 1400  Group End Time: 1500  Group Topic: Recreational/Music Therapy    [facility] [place] [address] ACUTE BEHAV [facility]    [place], [address] [facility] GROUP DOCUMENTATION GROUP    Group Therapy Note    Attendees: 5       Attendance: Attended    Patient's Goal:  To concentrate on selected task    Interventions/techniques: Supported-crafts,games,music    Follows Directions:  Followed directions    Interactions: Interacted appropriately    Mental Status: Calm and Flat    Behavior/appearance: Attentive and Cooperative    Goals Achieved: Able to engage in interactions and Able to listen to others-worked on selected task      Fortunato Centeno

## 2023-04-27 NOTE — GROUP NOTE
MYNOR  GROUP DOCUMENTATION INDIVIDUAL                                                                          Group Therapy Note    Date: 4/27/2023    Group Start Time: 1000  Group End Time: 1100  Group Topic: Topic Group    [facility] [place] 3 ACUTE BEHAV [facility]    Boris Petersen [address] GROUP    Group Therapy Note    Attendees: 5       Attendance: Did not attend    Jim Ruiz

## 2023-04-27 NOTE — PROGRESS NOTES
Problem: Suicide  Goal: *STG: Remains safe in hospital  Outcome: Progressing Towards Goal  Goal: *STG/LTG: No longer expresses self destructive or suicidal thoughts  Outcome: Progressing Towards Goal     Problem: Depressed Mood (Adult/Pediatric)  Goal: *STG: Participates in treatment plan  Outcome: Progressing Towards Goal  Goal: *STG: Remains safe in hospital  Outcome: Progressing Towards Goal  Goal: *STG: Complies with medication therapy  Outcome: Progressing Towards Goal     Problem: Falls - Risk of  Goal: *Absence of Falls  Description: Document Sandrita Fall Risk and appropriate interventions in the flowsheet. Outcome: Progressing Towards Goal  Note: Fall Risk Interventions:  Mentation Interventions: Reorient patient  Medication Interventions: Teach patient to arise slowly      Patient received standing at the nurse's station. Denies SI/HI/AVH (states she had AH when she first woke up this morning), endorses depression level of 8 and an anxiety level of 10. Calm, cooperative, med seeking, somatic, attended afternoon group. Taking medications as prescribed. Will continue to monitor for safety.

## 2023-04-27 NOTE — PROGRESS NOTES
Problem: Depressed Mood (Adult/Pediatric)  Goal: *STG: Demonstrates reduction in symptoms and increase in insight into coping skills/future focused  Outcome: Progressing Towards Goal     Problem: Suicide  Goal: *STG/LTG: Complies with medication therapy  Outcome: Progressing Towards Goal       1900 to 0700:      Report received from outgoing day shift RN. Assumed care of patient. On initial round Patient is in DR/ hallway up., alert, oriented, ambulating in hallway and requesting medication. . Patient  denies H/I, A/V/H and reports,S/I,pain 9/10,  depression,10/10,  anxiety 10/10. Patient has no scheduled HS medications. Patient received PRN  Zyprexa  5 mg and acetaminophen 650 mg. Patient remains present in milieu and continues to ask Nurse questions about medications. Patient is up and continues to ask for medication. 0450 am: PRN acetaminophen 650 mg P.O given for foot pain. Patient observed resting in bed during shift rounds. Continuously hourly rounds and q 15 minute checks maintained during shift for care and safety. Patient slept for  6 hrs.

## 2023-04-27 NOTE — BH NOTES
Psychiatric Progress Note    Patient: Isauro Lieberman MRN: [id number]  SSN: [id number]    YOB: 1985  Age: 42 y.o. Sex: female      Admit Date: 4/21/2023    LOS: 6 days     Subjective:     Isauro Lieberman  reports feeling better overall, but continues to have thoughts of suicide. Moods are depressed. Denies SI/HI/AH/VH. No aggression or violence. Appropriately interactive and aware. Tolerating medications well. Eating well and sleeping well (6 hrs). Tolerated Tylenol, Atarax and Zyprexa as needed. 04/24 - no acute overnight events. The patient is medication compliant, she slept 8 hours and is able to make her needs known. Patient medication compliant, and continues to report CAH and AH. PI is largely unchanged -- she continues to state that her  is dead. Patient denies active thoughts of self harm and is otherwise in fair behavioral control. She has been requesting anxiolytic agents (Klonopin) as well as stimulants for concentration. Discussed various contraindications for these agents including concomitant opioid use and stimulant abuse history, she voices understanding. 04/25 - the patient has been visible, she got PRN Zyprexa for psychosis and anxiety. She endorses SI without a plan, ongoing PI toward her  who she insists is dead despite both she and the team having spoken to him since admission. Patient medication compliant, she is anxious and reports worsening anxiety and withdrawal symptoms -- VSS otherwise and patient is in no apparent distress on interview. Patient actively paranoid and suspicious of her situation, but with no episodes of agitation or aggression. She is tolerating medication thus far.  Patient frequently requests benzodiazepines stating she has been on Klonopin for up to 3 years and is experiencing withdrawal.  shows Klonopin 1 mg BID filled monthly for at least 2 years and then tapered recently by her PCP at the same time gabapentin was prescribed more frequently. Of note, there are no Suboxone fills in the patient's  on review.  - the patient remains delusional, she is complaining about 'lice and bed bugs' per RN and reports that her  is . She endorses AH and is anxious appearing. Patient slept 4.5 hours overnight and got Tylenol and Gabapentin PRN. Patient has been unable to reality test. Patient reports that her  was buried yesterday after having been shot though the team has not been able to confirm this. Patient medication compliant, still seeking benzodiazepines. Patient with no physical symptoms of benzodiazepine withdrawal thus far. She voices understanding of the situation but is otherwise in fair behavioral control.  - overnight, the patient was visible, anxious and with ongoing ankle pain. Per RN report, she has been medication seeking, frequently at the nurses' station requesting anxiolytic agents. She is compliant with medications. XRAY of ankle pending. Patient otherwise in fair behavioral control, she is able to make her needs known and denies active thoughts of self harm. Her paranoid ideation is largely unchanged though she is able to reality test regarding her . AH continues.      Objective:     Vitals:    23 0846 23 0830   BP: 125/78 115/76 133/84 136/82   Pulse: 78 71 65 74   Resp: 16 15 16 15   Temp: 97.6 °F (36.4 °C) 98.2 °F (36.8 °C) 98.4 °F (36.9 °C) 97.9 °F (36.6 °C)   SpO2: 100% 100% 98% 99%   Weight:       Height:            Mental Status Exam:   Sensorium  oriented to time, place and person   Relations cooperative   Eye Contact appropriate   Appearance:  age appropriate   Speech:  non-pressured and soft   Thought Process: goal directed   Thought Content AH, paranoid delusions   Suicidal ideations no plan  and contracts for safety   Mood:  depressed   Affect:  constricted   Memory   adequate   Concentration:  adequate   Insight:  poor Judgment:  limited       MEDICATIONS:  Current Facility-Administered Medications   Medication Dose Route Frequency    ARIPiprazole (ABILIFY) tablet 20 mg  20 mg Oral DAILY    methyl salicylate-menthol (BENGAY) 15-10 % cream   Topical BID    gabapentin (NEURONTIN) capsule 400 mg  400 mg Oral TID    gabapentin (NEURONTIN) capsule 400 mg  400 mg Oral TID PRN    naloxone (NARCAN) injection 0.4 mg  0.4 mg IntraMUSCular EVERY 2 MINUTES AS NEEDED    OLANZapine (ZyPREXA) tablet 5 mg  5 mg Oral Q6H PRN    haloperidol lactate (HALDOL) injection 5 mg  5 mg IntraMUSCular Q6H PRN    benztropine (COGENTIN) tablet 1 mg  1 mg Oral BID PRN    diphenhydrAMINE (BENADRYL) injection 50 mg  50 mg IntraMUSCular BID PRN    LORazepam (ATIVAN) injection 1 mg  1 mg IntraMUSCular Q4H PRN    traZODone (DESYREL) tablet 50 mg  50 mg Oral QHS PRN    acetaminophen (TYLENOL) tablet 650 mg  650 mg Oral Q4H PRN    magnesium hydroxide (MILK OF MAGNESIA) 400 mg/5 mL oral suspension 30 mL  30 mL Oral DAILY PRN    aspirin delayed-release tablet 81 mg  81 mg Oral DAILY    buprenorphine-naloxone (SUBOXONE) 8-2mg SL tablet  3 Tablet SubLINGual DAILY    ondansetron (ZOFRAN ODT) tablet 4 mg  4 mg Oral Q6H PRN    sertraline (ZOLOFT) tablet 200 mg  200 mg Oral DAILY      DISCUSSION:   the risks and benefits of the proposed medication  patient given opportunity to ask questions    Lab/Data Review: All lab results for the last 24 hours reviewed. No results found for this or any previous visit (from the past 24 hour(s)).         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)      Opioid use disorder, mild, abuse ([address] (4/24/2023)        Plan:     - Collateral information  - CONTINUE Abilify 20 mg QDAY for psychosis, mood adjunct  - CONTINUE Neurontin 400 mg BID and 300 mg TID PRN anxiety  - TAPER Suboxone to 16 mg SL QDAY for opioid use disorder  - CONTINUE Zoloft 200 mg QDAY for MDD  - Avoid concomitant benzo and opioid administration  - Disposition planning with social work    I certify that this patient's inpatient psychiatric hospital services furnished since the previous certification were, and continue to be, required for treatment that could reasonably be expected to improve the patient's condition, or for diagnostic study, and that the patient continues to need, on a daily basis, active treatment furnished directly by or requiring the supervision of inpatient psychiatric facility personnel. In addition, the hospital records show that services furnished were intensive treatment services, admission or related services, or equivalent services.     Signed By: Tracy Lares MD     April 27, 2023

## 2023-04-27 NOTE — BH NOTES
Psychiatric Progress Note    Patient: Ciara Bates MRN: [id number]  SSN: [id number]    YOB: 1985  Age: 37 y.o.  Sex: female      Admit Date: 4/21/2023    LOS: 6 days     Subjective:     Ciara Bates  reports feeling better overall, but continues to have thoughts of suicide.  Moods are depressed.  Denies SI/HI/AH/VH.  No aggression or violence.  Appropriately interactive and aware. Tolerating medications well.  Eating well and sleeping well (6 hrs).  Tolerated Tylenol, Atarax and Zyprexa as needed.    04/24 - no acute overnight events. The patient is medication compliant, she slept 8 hours and is able to make her needs known. Patient medication compliant, and continues to report CAH and AH. PI is largely unchanged -- she continues to state that her  is dead. Patient denies active thoughts of self harm and is otherwise in fair behavioral control. She has been requesting anxiolytic agents (Klonopin) as well as stimulants for concentration. Discussed various contraindications for these agents including concomitant opioid use and stimulant abuse history, she voices understanding.    04/25 - the patient has been visible, she got PRN Zyprexa for psychosis and anxiety. She endorses SI without a plan, ongoing PI toward her  who she insists is dead despite both she and the team having spoken to him since admission. Patient medication compliant, she is anxious and reports worsening anxiety and withdrawal symptoms -- VSS otherwise and patient is in no apparent distress on interview. Patient actively paranoid and suspicious of her situation, but with no episodes of agitation or aggression. She is tolerating medication thus far. Patient frequently requests benzodiazepines stating she has been on Klonopin for up to 3 years and is experiencing withdrawal.  shows Klonopin 1 mg BID filled monthly for at least 2 years and then tapered recently by her PCP at the same time gabapentin was  prescribed more frequently. Of note, there are no Suboxone fills in the patient's  on review.     - the patient remains delusional, she is complaining about 'lice and bed bugs' per RN and reports that her  is . She endorses AH and is anxious appearing. Patient slept 4.5 hours overnight and got Tylenol and Gabapentin PRN. Patient has been unable to reality test. Patient reports that her  was buried yesterday after having been shot though the team has not been able to confirm this. Patient medication compliant, still seeking benzodiazepines. Patient with no physical symptoms of benzodiazepine withdrawal thus far. She voices understanding of the situation but is otherwise in fair behavioral control.      - overnight, the patient was visible, anxious and with ongoing ankle pain. Per RN report, she has been medication seeking, frequently at the nurses' station requesting anxiolytic agents. She is compliant with medications. XRAY of ankle pending. Patient otherwise in fair behavioral control, she is able to make her needs known and denies active thoughts of self harm. Her paranoid ideation is largely unchanged though she is able to reality test regarding her . AH continues.     Objective:     Vitals:    23 0846 23 0830   BP: 125/78 115/76 133/84 136/82   Pulse: 78 71 65 74   Resp: 16 15 16 15   Temp: 97.6 °F (36.4 °C) 98.2 °F (36.8 °C) 98.4 °F (36.9 °C) 97.9 °F (36.6 °C)   SpO2: 100% 100% 98% 99%   Weight:       Height:            Mental Status Exam:   Sensorium  oriented to time, place and person   Relations cooperative   Eye Contact appropriate   Appearance:  age appropriate   Speech:  non-pressured and soft   Thought Process: goal directed   Thought Content AH, paranoid delusions   Suicidal ideations no plan  and contracts for safety   Mood:  depressed   Affect:  constricted   Memory   adequate   Concentration:  adequate   Insight:  poor    Judgment:  limited       MEDICATIONS:  Current Facility-Administered Medications   Medication Dose Route Frequency    ARIPiprazole (ABILIFY) tablet 20 mg  20 mg Oral DAILY    methyl salicylate-menthol (BENGAY) 15-10 % cream   Topical BID    gabapentin (NEURONTIN) capsule 400 mg  400 mg Oral TID    gabapentin (NEURONTIN) capsule 400 mg  400 mg Oral TID PRN    naloxone (NARCAN) injection 0.4 mg  0.4 mg IntraMUSCular EVERY 2 MINUTES AS NEEDED    OLANZapine (ZyPREXA) tablet 5 mg  5 mg Oral Q6H PRN    haloperidol lactate (HALDOL) injection 5 mg  5 mg IntraMUSCular Q6H PRN    benztropine (COGENTIN) tablet 1 mg  1 mg Oral BID PRN    diphenhydrAMINE (BENADRYL) injection 50 mg  50 mg IntraMUSCular BID PRN    LORazepam (ATIVAN) injection 1 mg  1 mg IntraMUSCular Q4H PRN    traZODone (DESYREL) tablet 50 mg  50 mg Oral QHS PRN    acetaminophen (TYLENOL) tablet 650 mg  650 mg Oral Q4H PRN    magnesium hydroxide (MILK OF MAGNESIA) 400 mg/5 mL oral suspension 30 mL  30 mL Oral DAILY PRN    aspirin delayed-release tablet 81 mg  81 mg Oral DAILY    buprenorphine-naloxone (SUBOXONE) 8-2mg SL tablet  3 Tablet SubLINGual DAILY    ondansetron (ZOFRAN ODT) tablet 4 mg  4 mg Oral Q6H PRN    sertraline (ZOLOFT) tablet 200 mg  200 mg Oral DAILY      DISCUSSION:   the risks and benefits of the proposed medication  patient given opportunity to ask questions    Lab/Data Review:  All lab results for the last 24 hours reviewed.     No results found for this or any previous visit (from the past 24 hour(s)).        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)      Opioid use disorder, mild, abuse (HCC) (4/24/2023)        Plan:     - Collateral information  - CONTINUE Abilify 20 mg QDAY for psychosis, mood adjunct  - CONTINUE Neurontin 400 mg BID and 300 mg TID PRN anxiety  - TAPER Suboxone to 16 mg SL QDAY for opioid use disorder  - CONTINUE Zoloft  200 mg QDAY for MDD  - Avoid concomitant benzo and opioid administration  - Disposition planning with social work    I certify that this patient's inpatient psychiatric hospital services furnished since the previous certification were, and continue to be, required for treatment that could reasonably be expected to improve the patient's condition, or for diagnostic study, and that the patient continues to need, on a daily basis, active treatment furnished directly by or requiring the supervision of inpatient psychiatric facility personnel. In addition, the hospital records show that services furnished were intensive treatment services, admission or related services, or equivalent services.    Signed By: Moses Millard MD     April 27, 2023

## 2023-04-27 NOTE — PROGRESS NOTES
Problem: Depressed Mood (Adult/Pediatric)  Goal: *STG: Demonstrates reduction in symptoms and increase in insight into coping skills/future focused  Outcome: Progressing Towards Goal     Problem: Suicide  Goal: *STG/LTG: Complies with medication therapy  Outcome: Progressing Towards Goal       1900 to 0700:      Report received from outgoing day shift RN. Assumed care of patient. On initial round Patient is in DR/ hallway up., alert, oriented, ambulating in hallway and requesting medication.. Patient  denies H/I, A/V/H and reports,S/I,pain 9/10,  depression,10/10,  anxiety 10/10. Patient has no scheduled HS medications. Patient received PRN  Zyprexa  5 mg and acetaminophen 650 mg. Patient remains present in milieu and continues to ask Nurse questions about medications.    Patient is up and continues to ask for medication.   0450 am: PRN acetaminophen 650 mg P.O given for foot pain.   Patient observed resting in bed during shift rounds. Continuously hourly rounds and q 15 minute checks maintained during shift for care and safety. Patient slept for  6 hrs.

## 2023-04-27 NOTE — GROUP NOTE
[facility] GROUP DOCUMENTATION INDIVIDUAL                                                                          Group Therapy Note    Date: 4/27/2023    Group Start Time: 1000  Group End Time: 1100  Group Topic: Topic Group    [facility] 3 ACUTE BEHAV [facility]    Ariadna Caraballo  GROUP DOCUMENTATION GROUP    Group Therapy Note    Attendees: 5       Attendance: Did not attend    Ariadna Caraballo

## 2023-04-28 PROCEDURE — 65220000003 HC RM SEMIPRIVATE PSYCH

## 2023-04-28 PROCEDURE — 74011250637 HC RX REV CODE- 250/637: Performed by: NURSE PRACTITIONER

## 2023-04-28 PROCEDURE — 99232 SBSQ HOSP IP/OBS MODERATE 35: CPT | Performed by: PSYCHIATRY & NEUROLOGY

## 2023-04-28 PROCEDURE — 74011250637 HC RX REV CODE- 250/637: Performed by: PSYCHIATRY & NEUROLOGY

## 2023-04-28 PROCEDURE — 74011250636 HC RX REV CODE- 250/636: Performed by: PSYCHIATRY & NEUROLOGY

## 2023-04-28 RX ORDER — BUPRENORPHINE HYDROCHLORIDE AND NALOXONE HYDROCHLORIDE DIHYDRATE 8; 2 MG/1; MG/1
1.5 TABLET SUBLINGUAL DAILY
Status: DISCONTINUED | OUTPATIENT
Start: 2023-04-30 | End: 2023-05-01

## 2023-04-28 RX ORDER — BUPRENORPHINE HYDROCHLORIDE AND NALOXONE HYDROCHLORIDE DIHYDRATE 8; 2 MG/1; MG/1
2 TABLET SUBLINGUAL DAILY
Status: COMPLETED | OUTPATIENT
Start: 2023-04-29 | End: 2023-04-29

## 2023-04-28 RX ADMIN — TRAZODONE HYDROCHLORIDE 50 MG: 50 TABLET ORAL at 21:19

## 2023-04-28 RX ADMIN — BUPRENORPHINE HYDROCHLORIDE AND NALOXONE HYDROCHLORIDE DIHYDRATE 2 TABLET: 8; 2 TABLET SUBLINGUAL at 08:21

## 2023-04-28 RX ADMIN — MAGNESIUM HYDROXIDE 30 ML: 400 SUSPENSION ORAL at 08:58

## 2023-04-28 RX ADMIN — GABAPENTIN 400 MG: 100 CAPSULE ORAL at 12:22

## 2023-04-28 RX ADMIN — GABAPENTIN 400 MG: 100 CAPSULE ORAL at 17:27

## 2023-04-28 RX ADMIN — OLANZAPINE 5 MG: 5 TABLET, FILM COATED ORAL at 03:22

## 2023-04-28 RX ADMIN — ARIPIPRAZOLE 20 MG: 15 TABLET ORAL at 08:20

## 2023-04-28 RX ADMIN — GABAPENTIN 400 MG: 100 CAPSULE ORAL at 08:21

## 2023-04-28 RX ADMIN — ACETAMINOPHEN 650 MG: 325 TABLET ORAL at 21:18

## 2023-04-28 RX ADMIN — ACETAMINOPHEN 650 MG: 325 TABLET ORAL at 08:58

## 2023-04-28 RX ADMIN — SERTRALINE HYDROCHLORIDE 200 MG: 50 TABLET ORAL at 08:21

## 2023-04-28 RX ADMIN — ASPIRIN 81 MG: 81 TABLET, COATED ORAL at 08:21

## 2023-04-28 RX ADMIN — GABAPENTIN 400 MG: 300 CAPSULE ORAL at 21:19

## 2023-04-28 RX ADMIN — MENTHOL, METHYL SALICYLATE: 10; 15 CREAM TOPICAL at 18:25

## 2023-04-28 RX ADMIN — MENTHOL, METHYL SALICYLATE: 10; 15 CREAM TOPICAL at 12:23

## 2023-04-28 RX ADMIN — ACETAMINOPHEN 650 MG: 325 TABLET ORAL at 12:55

## 2023-04-28 RX ADMIN — OLANZAPINE 5 MG: 5 TABLET, FILM COATED ORAL at 21:19

## 2023-04-28 NOTE — PROGRESS NOTES
Patient denies SI/HI/AVH. C/O anxiety and depression. Rated her anxiety 9/10 and depression 10/10. Requested PRN Gabapentin and Trazodone given at 2101.    0322 Patient reports intrusive thoughts. Requesting medication. PRN Zyprexa given. 0422  Medication noted effective. Patient slept for 6.5 hours this shift.

## 2023-04-28 NOTE — BH NOTES
Behavioral Health Treatment Team Note     Progress note: Patient met with treatment team. Patient continues to present with a disorganized thought process. Patient continues to seek medications. Patient reports that she is homeless and needs a place to live. SW will speak with patient about attending a substance abuse program. SW spoke with patient about attending a substance abuse program. Patient reports that she needs assistance with housing. SW informed her that rehab is the treatment recommendation at this time. Patient reports that she will think about this and have answer Monday.      LOS:  7                     Expected LOS: TBD     Insurance info/prescription coverage: [address] [place] [address]   Date of last family contact:  Prabhjot Leger ( [phone])  Family requesting physician contact today:  no  Discharge plan:  home   Guns in the home:  no   Outpatient provider(s):  to be linked      Participating treatment team members: Adalid Aly, supervisee in social work, Nellie Morales MD

## 2023-04-28 NOTE — PROGRESS NOTES
Behavioral Services                                              Medicare Re-Certification    I certify that the inpatient psychiatric hospital services furnished since the previous certification/re-certification were, and continue to be, medically necessary for;    [x] (1) Treatment which could reasonably be expected to improve the patient's condition,    [x] (2) Or for diagnostic study. Estimated length of stay/service 4-6 days    Plan for post-hospital care home    This patient continues to need, on a daily basis, active treatment furnished directly by or requiring the supervision of inpatient psychiatric personnel.     Electronically signed by Monita Angelucci, MD on 4/28/2023 at 11:19 AM

## 2023-04-28 NOTE — GROUP NOTE
[facility] GROUP DOCUMENTATION INDIVIDUAL                                                                          Group Therapy Note    Date: 4/28/2023    Group Start Time: 1500  Group End Time: 1600  Group Topic: Recreational/Music Therapy    [facility] [place] [address] ACUTE BEHAV [facility]    Boris Petersen 3010 GROUP    Group Therapy Note    Attendees: 6       Attendance: Did not attend      Jonathan Dubois
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
## 2023-04-28 NOTE — PROGRESS NOTES
Behavioral Services                                              Medicare Re-Certification    I certify that the inpatient psychiatric hospital services furnished since the previous certification/re-certification were, and continue to be, medically necessary for;    [x] (1) Treatment which could reasonably be expected to improve the patient's condition,    [x] (2) Or for diagnostic study.    Estimated length of stay/service 4-6 days    Plan for post-hospital care home    This patient continues to need, on a daily basis, active treatment furnished directly by or requiring the supervision of inpatient psychiatric personnel.    Electronically signed by Moses Millard MD on 4/28/2023 at 11:19 AM

## 2023-04-28 NOTE — BH NOTES
Behavioral Health Treatment Team Note     Progress note: Patient met with treatment team. Patient continues to present with a disorganized thought process. Patient continues to seek medications. Patient reports that she is homeless and needs a place to live. SW will speak with patient about attending a substance abuse program. SW spoke with patient about attending a substance abuse program. Patient reports that she needs assistance with housing. SW informed her that rehab is the treatment recommendation at this time. Patient reports that she will think about this and have answer Monday.     LOS:  7                     Expected LOS: TBD     Insurance info/prescription coverage[place] [employer] [place] [employer] [facility] [place]   Date of last family contact:  Amrit ( [phone])  Family requesting physician contact today:  no  Discharge plan:  home   Guns in the home:  no   Outpatient provider(s):  to be linked      Participating treatment team members: Elmo Franco, supervisee in social work, Fran Millard MD

## 2023-04-28 NOTE — GROUP NOTE
[facility] GROUP DOCUMENTATION INDIVIDUAL                                                                          Group Therapy Note    Date: 4/28/2023    Group Start Time: 1500  Group End Time: 1600  Group Topic: Recreational/Music Therapy    [facility] 3 ACUTE BEHAV [facility]    Ariadna Caraballo  GROUP DOCUMENTATION GROUP    Group Therapy Note    Attendees: 6       Attendance: Did not attend      Ariadna Caraballo
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
## 2023-04-29 PROCEDURE — 74011250637 HC RX REV CODE- 250/637: Performed by: NURSE PRACTITIONER

## 2023-04-29 PROCEDURE — 74011250637 HC RX REV CODE- 250/637: Performed by: PSYCHIATRY & NEUROLOGY

## 2023-04-29 PROCEDURE — 74011250636 HC RX REV CODE- 250/636: Performed by: PSYCHIATRY & NEUROLOGY

## 2023-04-29 PROCEDURE — 65220000003 HC RM SEMIPRIVATE PSYCH

## 2023-04-29 RX ADMIN — MENTHOL, METHYL SALICYLATE: 10; 15 CREAM TOPICAL at 09:13

## 2023-04-29 RX ADMIN — OLANZAPINE 5 MG: 5 TABLET, FILM COATED ORAL at 21:36

## 2023-04-29 RX ADMIN — TRAZODONE HYDROCHLORIDE 50 MG: 50 TABLET ORAL at 21:36

## 2023-04-29 RX ADMIN — ASPIRIN 81 MG: 81 TABLET, COATED ORAL at 09:12

## 2023-04-29 RX ADMIN — GABAPENTIN 400 MG: 100 CAPSULE ORAL at 09:12

## 2023-04-29 RX ADMIN — SERTRALINE HYDROCHLORIDE 200 MG: 50 TABLET ORAL at 09:12

## 2023-04-29 RX ADMIN — GABAPENTIN 400 MG: 100 CAPSULE ORAL at 12:41

## 2023-04-29 RX ADMIN — ACETAMINOPHEN 650 MG: 325 TABLET ORAL at 06:00

## 2023-04-29 RX ADMIN — ARIPIPRAZOLE 20 MG: 15 TABLET ORAL at 09:12

## 2023-04-29 RX ADMIN — ACETAMINOPHEN 650 MG: 325 TABLET ORAL at 16:02

## 2023-04-29 RX ADMIN — GABAPENTIN 400 MG: 100 CAPSULE ORAL at 16:41

## 2023-04-29 RX ADMIN — MENTHOL, METHYL SALICYLATE: 10; 15 CREAM TOPICAL at 17:24

## 2023-04-29 RX ADMIN — ACETAMINOPHEN 650 MG: 325 TABLET ORAL at 21:36

## 2023-04-29 RX ADMIN — GABAPENTIN 400 MG: 300 CAPSULE ORAL at 06:00

## 2023-04-29 RX ADMIN — GABAPENTIN 400 MG: 300 CAPSULE ORAL at 21:36

## 2023-04-29 RX ADMIN — BUPRENORPHINE HYDROCHLORIDE AND NALOXONE HYDROCHLORIDE DIHYDRATE 2 TABLET: 8; 2 TABLET SUBLINGUAL at 09:12

## 2023-04-29 NOTE — BH NOTES
Psychiatric Progress Note    Patient: Todd Hdez MRN: [id number]  SSN: [id number]    YOB: 1985  Age: 42 y.o. Sex: female      Admit Date: 4/21/2023    LOS: 8 days     Subjective:     Todd Hdez  reports feeling better overall, but continues to have thoughts of suicide. Moods are depressed. Denies SI/HI/AH/VH. No aggression or violence. Appropriately interactive and aware. Tolerating medications well. Eating well and sleeping well (6 hrs). Tolerated Tylenol, Atarax and Zyprexa as needed. 04/24 - no acute overnight events. The patient is medication compliant, she slept 8 hours and is able to make her needs known. Patient medication compliant, and continues to report CAH and AH. PI is largely unchanged -- she continues to state that her  is dead. Patient denies active thoughts of self harm and is otherwise in fair behavioral control. She has been requesting anxiolytic agents (Klonopin) as well as stimulants for concentration. Discussed various contraindications for these agents including concomitant opioid use and stimulant abuse history, she voices understanding. 04/25 - the patient has been visible, she got PRN Zyprexa for psychosis and anxiety. She endorses SI without a plan, ongoing PI toward her  who she insists is dead despite both she and the team having spoken to him since admission. Patient medication compliant, she is anxious and reports worsening anxiety and withdrawal symptoms -- VSS otherwise and patient is in no apparent distress on interview. Patient actively paranoid and suspicious of her situation, but with no episodes of agitation or aggression. She is tolerating medication thus far.  Patient frequently requests benzodiazepines stating she has been on Klonopin for up to 3 years and is experiencing withdrawal.  shows Klonopin 1 mg BID filled monthly for at least 2 years and then tapered recently by her PCP at the same time gabapentin was prescribed more frequently. Of note, there are no Suboxone fills in the patient's  on review.  - the patient remains delusional, she is complaining about 'lice and bed bugs' per RN and reports that her  is . She endorses AH and is anxious appearing. Patient slept 4.5 hours overnight and got Tylenol and Gabapentin PRN. Patient has been unable to reality test. Patient reports that her  was buried yesterday after having been shot though the team has not been able to confirm this. Patient medication compliant, still seeking benzodiazepines. Patient with no physical symptoms of benzodiazepine withdrawal thus far. She voices understanding of the situation but is otherwise in fair behavioral control.  - overnight, the patient was visible, anxious and with ongoing ankle pain. Per RN report, she has been medication seeking, frequently at the nurses' station requesting anxiolytic agents. She is compliant with medications. XRAY of ankle pending. Patient otherwise in fair behavioral control, she is able to make her needs known and denies active thoughts of self harm. Her paranoid ideation is largely unchanged though she is able to reality test regarding her . AH continues.  - the patient slept 6.5 hours overnight. She is visible and anxious, and c/o pain in her ankle (XRAY negative). Patient is endorsing PI toward her  as well as an ongoing delusional of having parasites in her hair. She is able to reality test but remains somatically preoccupied, asking for special shampoo to rid her of bedbugs though none are apparent. Patient medication compliant, got PRN Gabapentin for anxiety with fair effect. She continues to request benzodiazepines and thus far is tolerating tapering of Suboxone.  - Ciara encountered in AM in bed in room. Mood is \"anxious\" and congruent with affect. She denies SI/HI/AVH. No delusions spontaneously voiced by patient.  Endorsed passive SI but denied any plan or intent while hospitalized. She denies HI/AVH. Medication compliant, denies any SE. Fair appetite and \"poor\" sleep (6.5 hours).      Objective:     Vitals:    04/27/23 2015 04/28/23 0845 04/28/23 2020 04/29/23 0819   BP: 115/78 124/68 115/73 125/77   Pulse: 77 76 73 84   Resp: 16 16 20 16   Temp: 97.9 °F (36.6 °C) 98 °F (36.7 °C) 98 °F (36.7 °C) 98.1 °F (36.7 °C)   SpO2: 100% 97%  95%   Weight:       Height:            Mental Status Exam:   Sensorium  oriented to time, place and person   Relations cooperative   Eye Contact appropriate   Appearance:  age appropriate   Speech:  non-pressured and soft   Thought Process: goal directed   Thought Content paranoid and somatic delusions   Suicidal ideations no plan  and contracts for safety   Mood:  depressed   Affect:  constricted   Memory   adequate   Concentration:  adequate   Insight:  poor   Judgment:  limited       MEDICATIONS:  Current Facility-Administered Medications   Medication Dose Route Frequency    [START ON 4/30/2023] buprenorphine-naloxone (SUBOXONE) 8-2mg SL tablet  1.5 Tablet SubLINGual DAILY    ARIPiprazole (ABILIFY) tablet 20 mg  20 mg Oral DAILY    methyl salicylate-menthol (BENGAY) 15-10 % cream   Topical BID    gabapentin (NEURONTIN) capsule 400 mg  400 mg Oral TID    gabapentin (NEURONTIN) capsule 400 mg  400 mg Oral TID PRN    naloxone (NARCAN) injection 0.4 mg  0.4 mg IntraMUSCular EVERY 2 MINUTES AS NEEDED    OLANZapine (ZyPREXA) tablet 5 mg  5 mg Oral Q6H PRN    haloperidol lactate (HALDOL) injection 5 mg  5 mg IntraMUSCular Q6H PRN    benztropine (COGENTIN) tablet 1 mg  1 mg Oral BID PRN    diphenhydrAMINE (BENADRYL) injection 50 mg  50 mg IntraMUSCular BID PRN    LORazepam (ATIVAN) injection 1 mg  1 mg IntraMUSCular Q4H PRN    traZODone (DESYREL) tablet 50 mg  50 mg Oral QHS PRN    acetaminophen (TYLENOL) tablet 650 mg  650 mg Oral Q4H PRN    magnesium hydroxide (MILK OF MAGNESIA) 400 mg/5 mL oral suspension 30 mL  30 mL Oral DAILY PRN    aspirin delayed-release tablet 81 mg  81 mg Oral DAILY    ondansetron (ZOFRAN ODT) tablet 4 mg  4 mg Oral Q6H PRN    sertraline (ZOLOFT) tablet 200 mg  200 mg Oral DAILY      DISCUSSION:   the risks and benefits of the proposed medication  patient given opportunity to ask questions    Lab/Data Review: All lab results for the last 24 hours reviewed. No results found for this or any previous visit (from the past 24 hour(s)). Assessment:     Principal Problem:    Psychosis ([facility] [address] (4/21/2023)    Active Problems:    Amphetamine use disorder, moderate (HCC) (4/24/2023)      Opioid use disorder, mild, abuse ([facility] [address] (4/24/2023)        Plan:     - Collateral information  - CONTINUE Abilify 20 mg QDAY for psychosis, mood adjunct  - CONTINUE Neurontin 400 mg BID and 300 mg TID PRN anxiety  - TAPER Suboxone to 12 mg SL QDAY for opioid use disorder on 04/30  - CONTINUE Zoloft 200 mg QDAY for MDD  - Avoid concomitant benzo and opioid administration  - Disposition planning with social work    I certify that this patient's inpatient psychiatric hospital services furnished since the previous certification were, and continue to be, required for treatment that could reasonably be expected to improve the patient's condition, or for diagnostic study, and that the patient continues to need, on a daily basis, active treatment furnished directly by or requiring the supervision of inpatient psychiatric facility personnel. In addition, the hospital records show that services furnished were intensive treatment services, admission or related services, or equivalent services.     Signed By: Hannah Matt NP     April 29, 2023

## 2023-04-29 NOTE — PROGRESS NOTES
Problem: Suicide  Goal: *STG: Remains safe in hospital  Outcome: Progressing Towards Goal  Goal: *STG: Seeks staff when feelings of self harm or harm towards others arise  Outcome: Progressing Towards Goal  Goal: *STG/LTG: Complies with medication therapy  Outcome: Progressing Towards Goal     Problem: Falls - Risk of  Goal: *Absence of Falls  Description: Document Sandrita Fall Risk and appropriate interventions in the flowsheet.  Outcome: Progressing Towards Goal  Note: Fall Risk Interventions:       Mentation Interventions: Reorient patient, Room close to nurse's station    Medication Interventions: Teach patient to arise slowly                   Problem: Depressed Mood (Adult/Pediatric)  Goal: *STG: Participates in treatment plan  Outcome: Progressing Towards Goal  Goal: *STG: Remains safe in hospital  Outcome: Progressing Towards Goal  Goal: *STG: Complies with medication therapy  Outcome: Progressing Towards Goal

## 2023-04-29 NOTE — BH NOTES
Received pt after shift change report alert, oriented x 4,ambulating in the hallway with frequent visits to the nursing station with multiple requests. Pt hard to remain settled in one place for some time. Had a labile mood, tearful,anxiety at 10,depression 10 and ankle pain. Patient worried about her daughter,tried to get in touch with her but went to voicemail. Prn Tylenol,Trazodone,Gabapentin and Zyprexa given and patient complied. Safety checks and care rounding on progress monitored by staff.  0600 AM,wen anxiety and L.ankle pain,medication offered monitoring for effectiveness . Slept for 6.30 hrs.

## 2023-04-29 NOTE — BH NOTES
Patient alert, anxious, cooperative. Denies SI/HI. Denies AVH. Denies pain. Denies depression. Overall feeling anxious. Medication and meal compliant. No distress observed. No other concerns expressed at this time. Q15 minutes checks ongoing.

## 2023-04-29 NOTE — BH NOTES
Received pt after shift change report alert, oriented x 4,ambulating in the hallway with frequent visits to the nursing station with multiple requests.Pt hard to remain settled in one place for some time.Had a labile mood, tearful,anxiety at 10,depression 10 and ankle pain.Patient worried about her daughter,tried to get in touch with her but went to voicemail.Prn Tylenol,Trazodone,Gabapentin and Zyprexa given and patient complied.Safety checks and care rounding on progress monitored by staff.  0600 AM,wen anxiety and L.ankle pain,medication offered monitoring for effectiveness .Slept for 6.30 hrs.

## 2023-04-29 NOTE — BH NOTES
Psychiatric Progress Note    Patient: Ciara Bates MRN: [id number]  SSN: [id number]    YOB: 1985  Age: 37 y.o.  Sex: female      Admit Date: 4/21/2023    LOS: 8 days     Subjective:     Ciara Bates  reports feeling better overall, but continues to have thoughts of suicide.  Moods are depressed.  Denies SI/HI/AH/VH.  No aggression or violence.  Appropriately interactive and aware. Tolerating medications well.  Eating well and sleeping well (6 hrs).  Tolerated Tylenol, Atarax and Zyprexa as needed.    04/24 - no acute overnight events. The patient is medication compliant, she slept 8 hours and is able to make her needs known. Patient medication compliant, and continues to report CAH and AH. PI is largely unchanged -- she continues to state that her  is dead. Patient denies active thoughts of self harm and is otherwise in fair behavioral control. She has been requesting anxiolytic agents (Klonopin) as well as stimulants for concentration. Discussed various contraindications for these agents including concomitant opioid use and stimulant abuse history, she voices understanding.    04/25 - the patient has been visible, she got PRN Zyprexa for psychosis and anxiety. She endorses SI without a plan, ongoing PI toward her  who she insists is dead despite both she and the team having spoken to him since admission. Patient medication compliant, she is anxious and reports worsening anxiety and withdrawal symptoms -- VSS otherwise and patient is in no apparent distress on interview. Patient actively paranoid and suspicious of her situation, but with no episodes of agitation or aggression. She is tolerating medication thus far. Patient frequently requests benzodiazepines stating she has been on Klonopin for up to 3 years and is experiencing withdrawal.  shows Klonopin 1 mg BID filled monthly for at least 2 years and then tapered recently by her PCP at the same time gabapentin was prescribed  more frequently. Of note, there are no Suboxone fills in the patient's  on review.     - the patient remains delusional, she is complaining about 'lice and bed bugs' per RN and reports that her  is . She endorses AH and is anxious appearing. Patient slept 4.5 hours overnight and got Tylenol and Gabapentin PRN. Patient has been unable to reality test. Patient reports that her  was buried yesterday after having been shot though the team has not been able to confirm this. Patient medication compliant, still seeking benzodiazepines. Patient with no physical symptoms of benzodiazepine withdrawal thus far. She voices understanding of the situation but is otherwise in fair behavioral control.      - overnight, the patient was visible, anxious and with ongoing ankle pain. Per RN report, she has been medication seeking, frequently at the nurses' station requesting anxiolytic agents. She is compliant with medications. XRAY of ankle pending. Patient otherwise in fair behavioral control, she is able to make her needs known and denies active thoughts of self harm. Her paranoid ideation is largely unchanged though she is able to reality test regarding her . AH continues.      - the patient slept 6.5 hours overnight. She is visible and anxious, and c/o pain in her ankle (XRAY negative). Patient is endorsing PI toward her  as well as an ongoing delusional of having parasites in her hair. She is able to reality test but remains somatically preoccupied, asking for special shampoo to rid her of bedbugs though none are apparent. Patient medication compliant, got PRN Gabapentin for anxiety with fair effect. She continues to request benzodiazepines and thus far is tolerating tapering of Suboxone.      - Ciara encountered in AM in bed in room. Mood is \"anxious\" and congruent with affect. She denies SI/HI/AVH. No delusions spontaneously voiced by patient. Endorsed passive SI but  denied any plan or intent while hospitalized. She denies HI/AVH. Medication compliant, denies any SE. Fair appetite and \"poor\" sleep (6.5 hours).     Objective:     Vitals:    04/27/23 2015 04/28/23 0845 04/28/23 2020 04/29/23 0819   BP: 115/78 124/68 115/73 125/77   Pulse: 77 76 73 84   Resp: 16 16 20 16   Temp: 97.9 °F (36.6 °C) 98 °F (36.7 °C) 98 °F (36.7 °C) 98.1 °F (36.7 °C)   SpO2: 100% 97%  95%   Weight:       Height:            Mental Status Exam:   Sensorium  oriented to time, place and person   Relations cooperative   Eye Contact appropriate   Appearance:  age appropriate   Speech:  non-pressured and soft   Thought Process: goal directed   Thought Content paranoid and somatic delusions   Suicidal ideations no plan  and contracts for safety   Mood:  depressed   Affect:  constricted   Memory   adequate   Concentration:  adequate   Insight:  poor   Judgment:  limited       MEDICATIONS:  Current Facility-Administered Medications   Medication Dose Route Frequency    [START ON 4/30/2023] buprenorphine-naloxone (SUBOXONE) 8-2mg SL tablet  1.5 Tablet SubLINGual DAILY    ARIPiprazole (ABILIFY) tablet 20 mg  20 mg Oral DAILY    methyl salicylate-menthol (BENGAY) 15-10 % cream   Topical BID    gabapentin (NEURONTIN) capsule 400 mg  400 mg Oral TID    gabapentin (NEURONTIN) capsule 400 mg  400 mg Oral TID PRN    naloxone (NARCAN) injection 0.4 mg  0.4 mg IntraMUSCular EVERY 2 MINUTES AS NEEDED    OLANZapine (ZyPREXA) tablet 5 mg  5 mg Oral Q6H PRN    haloperidol lactate (HALDOL) injection 5 mg  5 mg IntraMUSCular Q6H PRN    benztropine (COGENTIN) tablet 1 mg  1 mg Oral BID PRN    diphenhydrAMINE (BENADRYL) injection 50 mg  50 mg IntraMUSCular BID PRN    LORazepam (ATIVAN) injection 1 mg  1 mg IntraMUSCular Q4H PRN    traZODone (DESYREL) tablet 50 mg  50 mg Oral QHS PRN    acetaminophen (TYLENOL) tablet 650 mg  650 mg Oral Q4H PRN    magnesium hydroxide (MILK OF MAGNESIA) 400 mg/5 mL oral suspension 30 mL  30 mL Oral  DAILY PRN    aspirin delayed-release tablet 81 mg  81 mg Oral DAILY    ondansetron (ZOFRAN ODT) tablet 4 mg  4 mg Oral Q6H PRN    sertraline (ZOLOFT) tablet 200 mg  200 mg Oral DAILY      DISCUSSION:   the risks and benefits of the proposed medication  patient given opportunity to ask questions    Lab/Data Review:  All lab results for the last 24 hours reviewed.     No results found for this or any previous visit (from the past 24 hour(s)).        Assessment:     Principal Problem:    Psychosis ([facility]) (4/21/2023)    Active Problems:    Amphetamine use disorder, moderate ([facility]) (4/24/2023)      Opioid use disorder, mild, abuse ([facility]) (4/24/2023)        Plan:     - Collateral information  - CONTINUE Abilify 20 mg QDAY for psychosis, mood adjunct  - CONTINUE Neurontin 400 mg BID and 300 mg TID PRN anxiety  - TAPER Suboxone to 12 mg SL QDAY for opioid use disorder on 04/30  - CONTINUE Zoloft 200 mg QDAY for MDD  - Avoid concomitant benzo and opioid administration  - Disposition planning with social work    I certify that this patient's inpatient psychiatric hospital services furnished since the previous certification were, and continue to be, required for treatment that could reasonably be expected to improve the patient's condition, or for diagnostic study, and that the patient continues to need, on a daily basis, active treatment furnished directly by or requiring the supervision of inpatient psychiatric facility personnel. In addition, the hospital records show that services furnished were intensive treatment services, admission or related services, or equivalent services.    Signed By: Navarro Landaverde NP     April 29, 2023

## 2023-04-30 PROCEDURE — 74011250636 HC RX REV CODE- 250/636: Performed by: PSYCHIATRY & NEUROLOGY

## 2023-04-30 PROCEDURE — 74011250637 HC RX REV CODE- 250/637: Performed by: NURSE PRACTITIONER

## 2023-04-30 PROCEDURE — 65220000003 HC RM SEMIPRIVATE PSYCH

## 2023-04-30 PROCEDURE — 74011250637 HC RX REV CODE- 250/637: Performed by: PSYCHIATRY & NEUROLOGY

## 2023-04-30 RX ADMIN — MENTHOL, METHYL SALICYLATE: 10; 15 CREAM TOPICAL at 17:19

## 2023-04-30 RX ADMIN — GABAPENTIN 400 MG: 100 CAPSULE ORAL at 08:50

## 2023-04-30 RX ADMIN — GABAPENTIN 400 MG: 100 CAPSULE ORAL at 12:48

## 2023-04-30 RX ADMIN — TRAZODONE HYDROCHLORIDE 50 MG: 50 TABLET ORAL at 21:18

## 2023-04-30 RX ADMIN — ARIPIPRAZOLE 20 MG: 15 TABLET ORAL at 08:50

## 2023-04-30 RX ADMIN — BUPRENORPHINE HYDROCHLORIDE AND NALOXONE HYDROCHLORIDE DIHYDRATE 1.5 TABLET: 8; 2 TABLET SUBLINGUAL at 08:51

## 2023-04-30 RX ADMIN — ACETAMINOPHEN 650 MG: 325 TABLET ORAL at 13:19

## 2023-04-30 RX ADMIN — SERTRALINE HYDROCHLORIDE 200 MG: 50 TABLET ORAL at 08:50

## 2023-04-30 RX ADMIN — OLANZAPINE 5 MG: 5 TABLET, FILM COATED ORAL at 21:18

## 2023-04-30 RX ADMIN — MENTHOL, METHYL SALICYLATE: 10; 15 CREAM TOPICAL at 08:52

## 2023-04-30 RX ADMIN — ACETAMINOPHEN 650 MG: 325 TABLET ORAL at 17:00

## 2023-04-30 RX ADMIN — ACETAMINOPHEN 650 MG: 325 TABLET ORAL at 08:53

## 2023-04-30 RX ADMIN — GABAPENTIN 400 MG: 100 CAPSULE ORAL at 17:00

## 2023-04-30 NOTE — BH NOTES
Patient alert, cooperative. Denies SI/HI. Denies depression. Endorsed anxiety and headache. PRN po given with good effect. Tolerated well. Ciara needs redirection as she is constantly demonstrating attention seeking behaviors. Medication and meal compliant. No distress observed. No other concerns expressed at this time. Visible in the community. Q15 minutes checks ongoing.

## 2023-04-30 NOTE — PROGRESS NOTES
Problem: Suicide  Goal: *STG: Remains safe in hospital  Outcome: Progressing Towards Goal  Goal: *STG: Seeks staff when feelings of self harm or harm towards others arise  Outcome: Progressing Towards Goal

## 2023-04-30 NOTE — BH NOTES
Patient alert, cooperative. Denies SI/HI. Denies depression. Endorsed anxiety and headache. PRN po given with good effect. Tolerated well. Kerline Arm needs redirection as she is constantly demonstrating attention seeking behaviors. Medication and meal compliant. No distress observed. No other concerns expressed at this time. Visible in the community. Q15 minutes checks ongoing.

## 2023-04-30 NOTE — PROGRESS NOTES
Problem: Suicide  Goal: *STG: Remains safe in hospital  Outcome: Progressing Towards Goal  Goal: *STG: Seeks staff when feelings of self harm or harm towards others arise  Outcome: Progressing Towards Goal  Goal: *STG: Attends activities and groups  Outcome: Progressing Towards Goal  Goal: *STG:  Verbalizes alternative ways of dealing with maladaptive feelings/behaviors  Outcome: Progressing Towards Goal  Goal: *STG/LTG: Complies with medication therapy  Outcome: Progressing Towards Goal      [id number]- Report given by [place] and I assume care of the patient. She is standing in the hallway and constantly asking for any medications. She stated that she is still anxious but has received something for it at 1630. I told her that her next medication was at 2100. She is calm and cooperative with the information. She was given a snack and is sitting in the dayroom not interacting with peers. She was given Zyprexa, Atarax and Trazodone , the medications were effective and the patient was asleep most of the night. 0630- Patient slept 8 hrs. During the night.

## 2023-04-30 NOTE — PROGRESS NOTES
Problem: Suicide  Goal: *STG: Remains safe in hospital  Outcome: Progressing Towards Goal  Goal: *STG: Seeks staff when feelings of self harm or harm towards others arise  Outcome: Progressing Towards Goal  Goal: *STG: Attends activities and groups  Outcome: Progressing Towards Goal  Goal: *STG:  Verbalizes alternative ways of dealing with maladaptive feelings/behaviors  Outcome: Progressing Towards Goal  Goal: *STG/LTG: Complies with medication therapy  Outcome: Progressing Towards Goal      [id number]- Report given by [place] and I assume care of the patient. She is standing in the hallway and constantly asking for any medications. She stated that she is still anxious but has received something for it at 1630. I told her that her next medication was at 2100. She is calm and cooperative with the information. She was given a snack and is sitting in the dayroom not interacting with peers. She was given Zyprexa, Atarax and Trazodone , the medications were effective and the patient was asleep most of the night.    0630- Patient slept 8 hrs. During the night.

## 2023-05-01 VITALS
BODY MASS INDEX: 46.01 KG/M2 | HEIGHT: 62 IN | RESPIRATION RATE: 18 BRPM | TEMPERATURE: 98.1 F | DIASTOLIC BLOOD PRESSURE: 72 MMHG | WEIGHT: 250 LBS | OXYGEN SATURATION: 98 % | HEART RATE: 78 BPM | SYSTOLIC BLOOD PRESSURE: 107 MMHG

## 2023-05-01 PROCEDURE — 74011250637 HC RX REV CODE- 250/637: Performed by: PSYCHIATRY & NEUROLOGY

## 2023-05-01 PROCEDURE — 74011250636 HC RX REV CODE- 250/636: Performed by: PSYCHIATRY & NEUROLOGY

## 2023-05-01 PROCEDURE — 74011250637 HC RX REV CODE- 250/637: Performed by: NURSE PRACTITIONER

## 2023-05-01 PROCEDURE — 65220000003 HC RM SEMIPRIVATE PSYCH

## 2023-05-01 PROCEDURE — 99232 SBSQ HOSP IP/OBS MODERATE 35: CPT | Performed by: PSYCHIATRY & NEUROLOGY

## 2023-05-01 RX ORDER — ARIPIPRAZOLE 15 MG/1
30 TABLET ORAL DAILY
Status: DISCONTINUED | OUTPATIENT
Start: 2023-05-02 | End: 2023-05-02 | Stop reason: HOSPADM

## 2023-05-01 RX ORDER — BUPRENORPHINE HYDROCHLORIDE AND NALOXONE HYDROCHLORIDE DIHYDRATE 8; 2 MG/1; MG/1
1 TABLET SUBLINGUAL DAILY
Status: DISCONTINUED | OUTPATIENT
Start: 2023-05-02 | End: 2023-05-02 | Stop reason: HOSPADM

## 2023-05-01 RX ADMIN — BUPRENORPHINE HYDROCHLORIDE AND NALOXONE HYDROCHLORIDE DIHYDRATE 1.5 TABLET: 8; 2 TABLET SUBLINGUAL at 08:44

## 2023-05-01 RX ADMIN — GABAPENTIN 400 MG: 100 CAPSULE ORAL at 16:50

## 2023-05-01 RX ADMIN — SERTRALINE HYDROCHLORIDE 200 MG: 50 TABLET ORAL at 08:45

## 2023-05-01 RX ADMIN — GABAPENTIN 400 MG: 100 CAPSULE ORAL at 11:55

## 2023-05-01 RX ADMIN — MENTHOL, METHYL SALICYLATE: 10; 15 CREAM TOPICAL at 08:51

## 2023-05-01 RX ADMIN — GABAPENTIN 400 MG: 100 CAPSULE ORAL at 08:45

## 2023-05-01 RX ADMIN — GABAPENTIN 400 MG: 300 CAPSULE ORAL at 03:41

## 2023-05-01 RX ADMIN — ASPIRIN 81 MG: 81 TABLET, COATED ORAL at 08:45

## 2023-05-01 RX ADMIN — MENTHOL, METHYL SALICYLATE: 10; 15 CREAM TOPICAL at 16:51

## 2023-05-01 RX ADMIN — ARIPIPRAZOLE 20 MG: 15 TABLET ORAL at 08:44

## 2023-05-01 RX ADMIN — ACETAMINOPHEN 650 MG: 325 TABLET ORAL at 13:58

## 2023-05-01 NOTE — PROGRESS NOTES
Assumed care of pt after receiving shift report from outgoing nurse. No apparent distress observed. Pt mood is anxious with flat affect. Med and meal compliant. Alert and oriented. Pt currently denies SI/HI/AVH and pain. Pt currently endorses depression and anxiety. Pt interacts appropriately with staff and peers. No behavioral issues observed. Q15 minutes safety checks continued by staff. 1400: pt request medication for pain in head and left ankle; PRN Tylenol 650 mg PO provided          Problem: Falls - Risk of  Goal: *Absence of Falls  Description: Document Sandrita Fall Risk and appropriate interventions in the flowsheet.   Outcome: Progressing Towards Goal  Note: Fall Risk Interventions:  Mentation Interventions: Reorient patient    Medication Interventions: Teach patient to arise slowly    Problem: Depressed Mood (Adult/Pediatric)  Goal: *STG: Complies with medication therapy  Outcome: Progressing Towards Goal

## 2023-05-01 NOTE — PROGRESS NOTES
Problem: Suicide  Goal: *STG: Remains safe in hospital  Outcome: Progressing Towards Goal  Goal: *STG/LTG: Complies with medication therapy  Outcome: Progressing Towards Goal  Goal: *STG/LTG: No longer expresses self destructive or suicidal thoughts  Outcome: Progressing Towards Goal     Problem: Falls - Risk of  Goal: *Absence of Falls  Description: Document Sandrita Fall Risk and appropriate interventions in the flowsheet.   Outcome: Progressing Towards Goal  Note: Fall Risk Interventions:       Mentation Interventions: Reorient patient    Medication Interventions: Teach patient to arise slowly                   Problem: Depressed Mood (Adult/Pediatric)  Goal: *STG: Participates in treatment plan  Outcome: Progressing Towards Goal  Goal: *STG: Complies with medication therapy  Outcome: Progressing Towards Goal

## 2023-05-01 NOTE — BH NOTES
Behavioral Health Treatment Team Note       Progress note: Patient met with treatment team. Patient was alert and oriented x 4. Patient reports that she had a rough weekend and she still believes that she is seeing lice in her hair. She reports that she spoke with her  and daughter. She reports that she is not permitted to stay with her  as he is homeless too. SW asked patient about rehab, patient reports that she does not need rehab as she has stopped using drugs. Patient continues to present with poor insight. Patient continues to present paranoid and communicates with a blocked thought process at this time.      LOS:  10  Expected LOS: TBD    Insurance info/prescription coverage: [id number] [facility] [place]  Date of last family contact:    Family requesting physician contact today:  no  Discharge plan:  TBD  Guns in the home:  no   Outpatient provider(s):  to be linked     Participating treatment team members: Mitzi Hopkins, supervisee in social work, Sean King MD

## 2023-05-01 NOTE — GROUP NOTE
[facility]  GROUP DOCUMENTATION INDIVIDUAL                                                                          Group Therapy Note    Date: 5/1/2023    Group Start Time: 1500  Group End Time: 1600  Group Topic: Recreational/Music Therapy    [address] [facility] 3 ACUTE BEHAV [facility]    Boris Petersen [facility] GROUP    Group Therapy Note    Attendees: 2       Attendance: Did not attend    Brenda Hale

## 2023-05-01 NOTE — GROUP NOTE
[facility]  GROUP DOCUMENTATION INDIVIDUAL                                                                          Group Therapy Note    Date: 5/1/2023    Group Start Time: 1500  Group End Time: 1600  Group Topic: Recreational/Music Therapy    [address] [facility] 3 ACUTE BEHAV [facility]    Boris Petersen [facility] GROUP    Group Therapy Note    Attendees: 2       Attendance: Did not attend    Sal Thompson

## 2023-05-01 NOTE — BH NOTES
Behavioral Health Treatment Team Note       Progress note: Patient met with treatment team. Patient was alert and oriented x 4. Patient reports that she had a rough weekend and she still believes that she is seeing lice in her hair. She reports that she spoke with her  and daughter. She reports that she is not permitted to stay with her  as he is homeless too. SW asked patient about rehab, patient reports that she does not need rehab as she has stopped using drugs. Patient continues to present with poor insight. Patient continues to present paranoid and communicates with a blocked thought process at this time.      LOS:  10  Expected LOS: TBD    Insurance info/prescription coverage: [id number] [facility] [place]  Date of last family contact:    Family requesting physician contact today:  no  Discharge plan:  TBD  Guns in the home:  no   Outpatient provider(s):  to be linked     Participating treatment team members: Gardiner Homans, Samara Fess, supervisee in social work, Ken Hanson MD

## 2023-05-01 NOTE — GROUP NOTE
[facility]  GROUP DOCUMENTATION INDIVIDUAL                                                                          Group Therapy Note    Date: 5/1/2023    Group Start Time: 0900  Group End Time: 1000  Group Topic: Topic Group    [address] [facility] 3 ACUTE BEHAV [facility]    [place], [address] [facility] GROUP DOCUMENTATION GROUP    Group Therapy Note    Attendees: 7       Attendance: Attended    Patient's Goal: To identify positive coping strategies a-z    Interventions/techniques: Supported-worksheet    Follows Directions:  Followed directions    Interactions: Interacted appropriately    Mental Status: Calm    Behavior/appearance: Attentive and Cooperative    Goals Achieved: Able to engage in interactions, Able to listen to others, Able to give feedback to another, Able to self-disclose, and Discussed coping    Edward Sandoval

## 2023-05-01 NOTE — BH NOTES
Psychiatric Progress Note    Patient: Keshia Melendez MRN: [id number]  SSN: [id number]    YOB: 1985  Age: 42 y.o. Sex: female      Admit Date: 4/21/2023    LOS: 10 days     Subjective:     Keshia Melendez  reports feeling better overall, but continues to have thoughts of suicide. Moods are depressed. Denies SI/HI/AH/VH. No aggression or violence. Appropriately interactive and aware. Tolerating medications well. Eating well and sleeping well (6 hrs). Tolerated Tylenol, Atarax and Zyprexa as needed. 04/24 - no acute overnight events. The patient is medication compliant, she slept 8 hours and is able to make her needs known. Patient medication compliant, and continues to report CAH and AH. PI is largely unchanged -- she continues to state that her  is dead. Patient denies active thoughts of self harm and is otherwise in fair behavioral control. She has been requesting anxiolytic agents (Klonopin) as well as stimulants for concentration. Discussed various contraindications for these agents including concomitant opioid use and stimulant abuse history, she voices understanding. 04/25 - the patient has been visible, she got PRN Zyprexa for psychosis and anxiety. She endorses SI without a plan, ongoing PI toward her  who she insists is dead despite both she and the team having spoken to him since admission. Patient medication compliant, she is anxious and reports worsening anxiety and withdrawal symptoms -- VSS otherwise and patient is in no apparent distress on interview. Patient actively paranoid and suspicious of her situation, but with no episodes of agitation or aggression. She is tolerating medication thus far.  Patient frequently requests benzodiazepines stating she has been on Klonopin for up to 3 years and is experiencing withdrawal.  shows Klonopin 1 mg BID filled monthly for at least 2 years and then tapered recently by her PCP at the same time gabapentin was prescribed more frequently. Of note, there are no Suboxone fills in the patient's  on review.  - the patient remains delusional, she is complaining about 'lice and bed bugs' per RN and reports that her  is . She endorses AH and is anxious appearing. Patient slept 4.5 hours overnight and got Tylenol and Gabapentin PRN. Patient has been unable to reality test. Patient reports that her  was buried yesterday after having been shot though the team has not been able to confirm this. Patient medication compliant, still seeking benzodiazepines. Patient with no physical symptoms of benzodiazepine withdrawal thus far. She voices understanding of the situation but is otherwise in fair behavioral control.  - overnight, the patient was visible, anxious and with ongoing ankle pain. Per RN report, she has been medication seeking, frequently at the nurses' station requesting anxiolytic agents. She is compliant with medications. XRAY of ankle pending. Patient otherwise in fair behavioral control, she is able to make her needs known and denies active thoughts of self harm. Her paranoid ideation is largely unchanged though she is able to reality test regarding her . AH continues.  - the patient slept 6.5 hours overnight. She is visible and anxious, and c/o pain in her ankle (XRAY negative). Patient is endorsing PI toward her  as well as an ongoing delusional of having parasites in her hair. She is able to reality test but remains somatically preoccupied, asking for special shampoo to rid her of bedbugs though none are apparent. Patient medication compliant, got PRN Gabapentin for anxiety with fair effect. She continues to request benzodiazepines and thus far is tolerating tapering of Suboxone.  - Ciara encountered in AM in bed in room. Mood is \"anxious\" and congruent with affect. She denies SI/HI/AVH. No delusions spontaneously voiced by patient.  Endorsed passive SI but denied any plan or intent while hospitalized. She denies HI/AVH. Medication compliant, denies any SE. Fair appetite and \"poor\" sleep (6.5 hours). 05/01 - the patient remains oddly related but cooperative with treatment. She is no longer stating that her  is dead but the delusional parasitosis remains. She states she sees the objects in her hair despite reality testing from RN. Patient with no signficant change over the weekend, she is anxious and requests medications often. Patient given Trazodone, gabapentin and Zyprexa PRN with some effect. Patient still amenable with opiate cross taper to address black box interactions. SW working on disposition, the patient does not want rehab and states she is homeless though this is not clear.     Objective:     Vitals:    04/29/23 2030 04/30/23 0820 04/30/23 2023 05/01/23 0815   BP: 115/68 126/76 124/84 120/60   Pulse: 68 71 67 65   Resp: 18 16 16 16   Temp: 97.6 °F (36.4 °C) 97.8 °F (36.6 °C) 97.4 °F (36.3 °C) 98.3 °F (36.8 °C)   SpO2: 98% 98% 97% 99%   Weight:       Height:            Mental Status Exam:   Sensorium  oriented to time, place and person   Relations cooperative   Eye Contact appropriate   Appearance:  age appropriate   Speech:  non-pressured and soft   Thought Process: goal directed   Thought Content paranoid and somatic delusions   Suicidal ideations no plan  and contracts for safety   Mood:  depressed   Affect:  constricted   Memory   adequate   Concentration:  adequate   Insight:  poor   Judgment:  limited       MEDICATIONS:  Current Facility-Administered Medications   Medication Dose Route Frequency    [START ON 5/2/2023] ARIPiprazole (ABILIFY) tablet 30 mg  30 mg Oral DAILY    [START ON 5/2/2023] buprenorphine-naloxone (SUBOXONE) 8-2mg SL tablet  1 Tablet SubLINGual DAILY    methyl salicylate-menthol (BENGAY) 15-10 % cream   Topical BID    gabapentin (NEURONTIN) capsule 400 mg  400 mg Oral TID    gabapentin (NEURONTIN) capsule 400 mg  400 mg Oral TID PRN    naloxone (NARCAN) injection 0.4 mg  0.4 mg IntraMUSCular EVERY 2 MINUTES AS NEEDED    OLANZapine (ZyPREXA) tablet 5 mg  5 mg Oral Q6H PRN    haloperidol lactate (HALDOL) injection 5 mg  5 mg IntraMUSCular Q6H PRN    benztropine (COGENTIN) tablet 1 mg  1 mg Oral BID PRN    diphenhydrAMINE (BENADRYL) injection 50 mg  50 mg IntraMUSCular BID PRN    LORazepam (ATIVAN) injection 1 mg  1 mg IntraMUSCular Q4H PRN    traZODone (DESYREL) tablet 50 mg  50 mg Oral QHS PRN    acetaminophen (TYLENOL) tablet 650 mg  650 mg Oral Q4H PRN    magnesium hydroxide (MILK OF MAGNESIA) 400 mg/5 mL oral suspension 30 mL  30 mL Oral DAILY PRN    aspirin delayed-release tablet 81 mg  81 mg Oral DAILY    ondansetron (ZOFRAN ODT) tablet 4 mg  4 mg Oral Q6H PRN    sertraline (ZOLOFT) tablet 200 mg  200 mg Oral DAILY      DISCUSSION:   the risks and benefits of the proposed medication  patient given opportunity to ask questions    Lab/Data Review: All lab results for the last 24 hours reviewed. No results found for this or any previous visit (from the past 24 hour(s)).         Assessment:     Principal Problem:    Psychosis ([facility] (4/21/2023)    Active Problems:    Amphetamine use disorder, moderate (HCC) (4/24/2023)      Opioid use disorder, mild, abuse ([facility] (4/24/2023)        Plan:     - Collateral information  - INCREASE Abilify to 30 mg QDAY for psychosis, mood adjunct  - CONTINUE Neurontin 400 mg BID and 300 mg TID PRN anxiety  - TAPER Suboxone to 8 mg SL QDAY for opioid use disorder  - CONTINUE Zoloft 200 mg QDAY for MDD  - Avoid concomitant benzo and opioid administration  - Disposition planning with social work    I certify that this patient's inpatient psychiatric hospital services furnished since the previous certification were, and continue to be, required for treatment that could reasonably be expected to improve the patient's condition, or for diagnostic study, and that the patient continues to need, on a daily basis, active treatment furnished directly by or requiring the supervision of inpatient psychiatric facility personnel. In addition, the hospital records show that services furnished were intensive treatment services, admission or related services, or equivalent services.     Signed By: Glenroy Johnson MD     May 1, 2023

## 2023-05-01 NOTE — BH NOTES
Psychiatric Progress Note    Patient: Astrid Chi MRN: [id number]  SSN: [id number]    YOB: 1985  Age: 42 y.o. Sex: female      Admit Date: 4/21/2023    LOS: 10 days     Subjective:     Astrid Chi  reports feeling better overall, but continues to have thoughts of suicide. Moods are depressed. Denies SI/HI/AH/VH. No aggression or violence. Appropriately interactive and aware. Tolerating medications well. Eating well and sleeping well (6 hrs). Tolerated Tylenol, Atarax and Zyprexa as needed. 04/24 - no acute overnight events. The patient is medication compliant, she slept 8 hours and is able to make her needs known. Patient medication compliant, and continues to report CAH and AH. PI is largely unchanged -- she continues to state that her  is dead. Patient denies active thoughts of self harm and is otherwise in fair behavioral control. She has been requesting anxiolytic agents (Klonopin) as well as stimulants for concentration. Discussed various contraindications for these agents including concomitant opioid use and stimulant abuse history, she voices understanding. 04/25 - the patient has been visible, she got PRN Zyprexa for psychosis and anxiety. She endorses SI without a plan, ongoing PI toward her  who she insists is dead despite both she and the team having spoken to him since admission. Patient medication compliant, she is anxious and reports worsening anxiety and withdrawal symptoms -- VSS otherwise and patient is in no apparent distress on interview. Patient actively paranoid and suspicious of her situation, but with no episodes of agitation or aggression. She is tolerating medication thus far.  Patient frequently requests benzodiazepines stating she has been on Klonopin for up to 3 years and is experiencing withdrawal.  shows Klonopin 1 mg BID filled monthly for at least 2 years and then tapered recently by her PCP at the same time gabapentin was prescribed more frequently. Of note, there are no Suboxone fills in the patient's  on review.  - the patient remains delusional, she is complaining about 'lice and bed bugs' per RN and reports that her  is . She endorses AH and is anxious appearing. Patient slept 4.5 hours overnight and got Tylenol and Gabapentin PRN. Patient has been unable to reality test. Patient reports that her  was buried yesterday after having been shot though the team has not been able to confirm this. Patient medication compliant, still seeking benzodiazepines. Patient with no physical symptoms of benzodiazepine withdrawal thus far. She voices understanding of the situation but is otherwise in fair behavioral control.  - overnight, the patient was visible, anxious and with ongoing ankle pain. Per RN report, she has been medication seeking, frequently at the nurses' station requesting anxiolytic agents. She is compliant with medications. XRAY of ankle pending. Patient otherwise in fair behavioral control, she is able to make her needs known and denies active thoughts of self harm. Her paranoid ideation is largely unchanged though she is able to reality test regarding her . AH continues.  - the patient slept 6.5 hours overnight. She is visible and anxious, and c/o pain in her ankle (XRAY negative). Patient is endorsing PI toward her  as well as an ongoing delusional of having parasites in her hair. She is able to reality test but remains somatically preoccupied, asking for special shampoo to rid her of bedbugs though none are apparent. Patient medication compliant, got PRN Gabapentin for anxiety with fair effect. She continues to request benzodiazepines and thus far is tolerating tapering of Suboxone.  - Ciara encountered in AM in bed in room. Mood is \"anxious\" and congruent with affect. She denies SI/HI/AVH. No delusions spontaneously voiced by patient.  Endorsed passive SI but denied any plan or intent while hospitalized. She denies HI/AVH. Medication compliant, denies any SE. Fair appetite and \"poor\" sleep (6.5 hours). 05/01 - the patient remains oddly related but cooperative with treatment. She is no longer stating that her  is dead but the delusional parasitosis remains. She states she sees the objects in her hair despite reality testing from RN. Patient with no signficant change over the weekend, she is anxious and requests medications often. Patient given Trazodone, gabapentin and Zyprexa PRN with some effect. Patient still amenable with opiate cross taper to address black box interactions. SW working on disposition, the patient does not want rehab and states she is homeless though this is not clear.     Objective:     Vitals:    04/29/23 2030 04/30/23 0820 04/30/23 2023 05/01/23 0815   BP: 115/68 126/76 124/84 120/60   Pulse: 68 71 67 65   Resp: 18 16 16 16   Temp: 97.6 °F (36.4 °C) 97.8 °F (36.6 °C) 97.4 °F (36.3 °C) 98.3 °F (36.8 °C)   SpO2: 98% 98% 97% 99%   Weight:       Height:            Mental Status Exam:   Sensorium  oriented to time, place and person   Relations cooperative   Eye Contact appropriate   Appearance:  age appropriate   Speech:  non-pressured and soft   Thought Process: goal directed   Thought Content paranoid and somatic delusions   Suicidal ideations no plan  and contracts for safety   Mood:  depressed   Affect:  constricted   Memory   adequate   Concentration:  adequate   Insight:  poor   Judgment:  limited       MEDICATIONS:  Current Facility-Administered Medications   Medication Dose Route Frequency    [START ON 5/2/2023] ARIPiprazole (ABILIFY) tablet 30 mg  30 mg Oral DAILY    [START ON 5/2/2023] buprenorphine-naloxone (SUBOXONE) 8-2mg SL tablet  1 Tablet SubLINGual DAILY    methyl salicylate-menthol (BENGAY) 15-10 % cream   Topical BID    gabapentin (NEURONTIN) capsule 400 mg  400 mg Oral TID    gabapentin (NEURONTIN) capsule 400 mg  400 mg Oral TID PRN    naloxone (NARCAN) injection 0.4 mg  0.4 mg IntraMUSCular EVERY 2 MINUTES AS NEEDED    OLANZapine (ZyPREXA) tablet 5 mg  5 mg Oral Q6H PRN    haloperidol lactate (HALDOL) injection 5 mg  5 mg IntraMUSCular Q6H PRN    benztropine (COGENTIN) tablet 1 mg  1 mg Oral BID PRN    diphenhydrAMINE (BENADRYL) injection 50 mg  50 mg IntraMUSCular BID PRN    LORazepam (ATIVAN) injection 1 mg  1 mg IntraMUSCular Q4H PRN    traZODone (DESYREL) tablet 50 mg  50 mg Oral QHS PRN    acetaminophen (TYLENOL) tablet 650 mg  650 mg Oral Q4H PRN    magnesium hydroxide (MILK OF MAGNESIA) 400 mg/5 mL oral suspension 30 mL  30 mL Oral DAILY PRN    aspirin delayed-release tablet 81 mg  81 mg Oral DAILY    ondansetron (ZOFRAN ODT) tablet 4 mg  4 mg Oral Q6H PRN    sertraline (ZOLOFT) tablet 200 mg  200 mg Oral DAILY      DISCUSSION:   the risks and benefits of the proposed medication  patient given opportunity to ask questions    Lab/Data Review: All lab results for the last 24 hours reviewed. No results found for this or any previous visit (from the past 24 hour(s)).         Assessment:     Principal Problem:    Psychosis ([facility] (4/21/2023)    Active Problems:    Amphetamine use disorder, moderate (HCC) (4/24/2023)      Opioid use disorder, mild, abuse ([facility] (4/24/2023)        Plan:     - Collateral information  - INCREASE Abilify to 30 mg QDAY for psychosis, mood adjunct  - CONTINUE Neurontin 400 mg BID and 300 mg TID PRN anxiety  - TAPER Suboxone to 8 mg SL QDAY for opioid use disorder  - CONTINUE Zoloft 200 mg QDAY for MDD  - Avoid concomitant benzo and opioid administration  - Disposition planning with social work    I certify that this patient's inpatient psychiatric hospital services furnished since the previous certification were, and continue to be, required for treatment that could reasonably be expected to improve the patient's condition, or for diagnostic study, and that the patient continues to need, on a daily basis, active treatment furnished directly by or requiring the supervision of inpatient psychiatric facility personnel. In addition, the hospital records show that services furnished were intensive treatment services, admission or related services, or equivalent services.     Signed By: Lizeth Templeton MD     May 1, 2023

## 2023-05-01 NOTE — BH NOTES
Alert and oriented patient x 4,with clear soft spoken speech received after shift change report in her room,dull flat affect, anxious and depressed. Pt denied having SI/HI/AH/VH. Patient calm,cooperative and med compliant. Safety checks and care rounds on progress,Patient engaged herself in coloring which kept her busy minimizing frequent request at the nursing station. PRN Gabapentin, Zyprexa and Trazodone given. appeared to have slept for 6.45 hrs.

## 2023-05-01 NOTE — GROUP NOTE
MY[facility]  GROUP DOCUMENTATION INDIVIDUAL                                                                          Group Therapy Note    Date: 5/1/2023    Group Start Time: 0900  Group End Time: 1000  Group Topic: Topic Group    [facility] [place] [address] ACUTE BEHAV [facility]    [place], [address] [facility] GROUP DOCUMENTATION GROUP    Group Therapy Note    Attendees: 7       Attendance: Attended    Patient's Goal: To identify positive coping strategies a-z    Interventions/techniques: Supported-worksheet    Follows Directions:  Followed directions    Interactions: Interacted appropriately    Mental Status: Calm    Behavior/appearance: Attentive and Cooperative    Goals Achieved: Able to engage in interactions, Able to listen to others, Able to give feedback to another, Able to self-disclose, and Discussed coping    Angela Gayle

## 2023-05-02 PROCEDURE — 74011250637 HC RX REV CODE- 250/637: Performed by: PSYCHIATRY & NEUROLOGY

## 2023-05-02 PROCEDURE — 74011250637 HC RX REV CODE- 250/637: Performed by: NURSE PRACTITIONER

## 2023-05-02 PROCEDURE — 99239 HOSP IP/OBS DSCHRG MGMT >30: CPT | Performed by: PSYCHIATRY & NEUROLOGY

## 2023-05-02 PROCEDURE — 74011250636 HC RX REV CODE- 250/636: Performed by: PSYCHIATRY & NEUROLOGY

## 2023-05-02 RX ORDER — BUPRENORPHINE HYDROCHLORIDE AND NALOXONE HYDROCHLORIDE DIHYDRATE 8; 2 MG/1; MG/1
1 TABLET SUBLINGUAL DAILY
Qty: 7 TABLET | Refills: 0 | Status: SHIPPED
Start: 2023-05-03 | End: 2023-05-10

## 2023-05-02 RX ADMIN — ARIPIPRAZOLE 30 MG: 15 TABLET ORAL at 08:03

## 2023-05-02 RX ADMIN — MENTHOL, METHYL SALICYLATE: 10; 15 CREAM TOPICAL at 08:08

## 2023-05-02 RX ADMIN — ACETAMINOPHEN 650 MG: 325 TABLET ORAL at 08:08

## 2023-05-02 RX ADMIN — GABAPENTIN 400 MG: 300 CAPSULE ORAL at 01:50

## 2023-05-02 RX ADMIN — GABAPENTIN 400 MG: 100 CAPSULE ORAL at 08:04

## 2023-05-02 RX ADMIN — BUPRENORPHINE HYDROCHLORIDE AND NALOXONE HYDROCHLORIDE DIHYDRATE 1 TABLET: 8; 2 TABLET SUBLINGUAL at 08:03

## 2023-05-02 RX ADMIN — SERTRALINE HYDROCHLORIDE 200 MG: 50 TABLET ORAL at 08:03

## 2023-05-02 RX ADMIN — TRAZODONE HYDROCHLORIDE 50 MG: 50 TABLET ORAL at 01:50

## 2023-05-02 RX ADMIN — ASPIRIN 81 MG: 81 TABLET, COATED ORAL at 08:03

## 2023-07-01 ENCOUNTER — HOSPITAL ENCOUNTER (EMERGENCY)
Facility: HOSPITAL | Age: 38
Discharge: ANOTHER HOSPITAL | End: 2023-07-02
Payer: COMMERCIAL

## 2023-07-01 DIAGNOSIS — R45.851 DEPRESSION WITH SUICIDAL IDEATION: Primary | ICD-10-CM

## 2023-07-01 DIAGNOSIS — F32.A DEPRESSION WITH SUICIDAL IDEATION: Primary | ICD-10-CM

## 2023-07-01 LAB
ETHANOL SERPL-MCNC: <10 MG/DL (ref 0–0.08)
FLUAV RNA SPEC QL NAA+PROBE: NOT DETECTED
FLUBV RNA SPEC QL NAA+PROBE: NOT DETECTED
SARS-COV-2 RNA RESP QL NAA+PROBE: NOT DETECTED

## 2023-07-01 PROCEDURE — 82077 ASSAY SPEC XCP UR&BREATH IA: CPT

## 2023-07-01 PROCEDURE — 99285 EMERGENCY DEPT VISIT HI MDM: CPT

## 2023-07-01 PROCEDURE — 36415 COLL VENOUS BLD VENIPUNCTURE: CPT

## 2023-07-01 PROCEDURE — 87636 SARSCOV2 & INF A&B AMP PRB: CPT

## 2023-07-01 ASSESSMENT — PAIN - FUNCTIONAL ASSESSMENT: PAIN_FUNCTIONAL_ASSESSMENT: NONE - DENIES PAIN

## 2023-07-02 ENCOUNTER — HOSPITAL ENCOUNTER (INPATIENT)
Facility: HOSPITAL | Age: 38
LOS: 5 days | Discharge: HOME | End: 2023-07-07
Attending: PSYCHIATRY & NEUROLOGY | Admitting: PSYCHIATRY & NEUROLOGY
Payer: COMMERCIAL

## 2023-07-02 VITALS
SYSTOLIC BLOOD PRESSURE: 130 MMHG | HEART RATE: 96 BPM | OXYGEN SATURATION: 99 % | HEIGHT: 62 IN | TEMPERATURE: 97.8 F | WEIGHT: 250 LBS | DIASTOLIC BLOOD PRESSURE: 78 MMHG | RESPIRATION RATE: 18 BRPM | BODY MASS INDEX: 46.01 KG/M2

## 2023-07-02 DIAGNOSIS — F11.10 OPIOID USE DISORDER, MILD, ABUSE (HCC): Primary | ICD-10-CM

## 2023-07-02 DIAGNOSIS — F41.0 PANIC DISORDER: ICD-10-CM

## 2023-07-02 PROBLEM — F43.20 ADJUSTMENT DISORDER: Status: ACTIVE | Noted: 2023-07-02

## 2023-07-02 LAB
AMPHET UR QL SCN: NEGATIVE
BARBITURATES UR QL SCN: NEGATIVE
BENZODIAZ UR QL: NEGATIVE
CANNABINOIDS UR QL SCN: NEGATIVE
COCAINE UR QL SCN: NEGATIVE
Lab: NORMAL
METHADONE UR QL: NEGATIVE
OPIATES UR QL: NEGATIVE
PCP UR QL: NEGATIVE

## 2023-07-02 PROCEDURE — 1240000000 HC EMOTIONAL WELLNESS R&B

## 2023-07-02 PROCEDURE — 6370000000 HC RX 637 (ALT 250 FOR IP): Performed by: NURSE PRACTITIONER

## 2023-07-02 PROCEDURE — 80307 DRUG TEST PRSMV CHEM ANLYZR: CPT

## 2023-07-02 RX ORDER — CLONAZEPAM 0.5 MG/1
0.5 TABLET ORAL 2 TIMES DAILY PRN
Status: DISCONTINUED | OUTPATIENT
Start: 2023-07-02 | End: 2023-07-07 | Stop reason: HOSPADM

## 2023-07-02 RX ORDER — POTASSIUM CHLORIDE 750 MG/1
40 TABLET, FILM COATED, EXTENDED RELEASE ORAL ONCE
Status: COMPLETED | OUTPATIENT
Start: 2023-07-02 | End: 2023-07-02

## 2023-07-02 RX ORDER — DIPHENHYDRAMINE HYDROCHLORIDE 50 MG/ML
50 INJECTION INTRAMUSCULAR; INTRAVENOUS EVERY 4 HOURS PRN
Status: DISCONTINUED | OUTPATIENT
Start: 2023-07-02 | End: 2023-07-07 | Stop reason: HOSPADM

## 2023-07-02 RX ORDER — MAGNESIUM HYDROXIDE/ALUMINUM HYDROXICE/SIMETHICONE 120; 1200; 1200 MG/30ML; MG/30ML; MG/30ML
30 SUSPENSION ORAL EVERY 6 HOURS PRN
Status: DISCONTINUED | OUTPATIENT
Start: 2023-07-02 | End: 2023-07-07 | Stop reason: HOSPADM

## 2023-07-02 RX ORDER — HYDROXYZINE 50 MG/1
50 TABLET, FILM COATED ORAL 3 TIMES DAILY PRN
Status: DISCONTINUED | OUTPATIENT
Start: 2023-07-02 | End: 2023-07-07 | Stop reason: HOSPADM

## 2023-07-02 RX ORDER — SENNA PLUS 8.6 MG/1
1 TABLET ORAL DAILY PRN
Status: DISCONTINUED | OUTPATIENT
Start: 2023-07-02 | End: 2023-07-07 | Stop reason: HOSPADM

## 2023-07-02 RX ORDER — ASPIRIN 81 MG/1
81 TABLET, CHEWABLE ORAL DAILY
Status: DISCONTINUED | OUTPATIENT
Start: 2023-07-03 | End: 2023-07-07 | Stop reason: HOSPADM

## 2023-07-02 RX ORDER — ZIPRASIDONE HYDROCHLORIDE 20 MG/1
20 CAPSULE ORAL 2 TIMES DAILY WITH MEALS
Status: DISCONTINUED | OUTPATIENT
Start: 2023-07-02 | End: 2023-07-04

## 2023-07-02 RX ORDER — TRAZODONE HYDROCHLORIDE 50 MG/1
50 TABLET ORAL NIGHTLY PRN
Status: DISCONTINUED | OUTPATIENT
Start: 2023-07-02 | End: 2023-07-07 | Stop reason: HOSPADM

## 2023-07-02 RX ORDER — POLYETHYLENE GLYCOL 3350 17 G/17G
17 POWDER, FOR SOLUTION ORAL DAILY PRN
Status: DISCONTINUED | OUTPATIENT
Start: 2023-07-02 | End: 2023-07-07 | Stop reason: HOSPADM

## 2023-07-02 RX ORDER — ACETAMINOPHEN 325 MG/1
650 TABLET ORAL EVERY 4 HOURS PRN
Status: DISCONTINUED | OUTPATIENT
Start: 2023-07-02 | End: 2023-07-07 | Stop reason: HOSPADM

## 2023-07-02 RX ORDER — HALOPERIDOL 5 MG/1
5 TABLET ORAL EVERY 4 HOURS PRN
Status: DISCONTINUED | OUTPATIENT
Start: 2023-07-02 | End: 2023-07-07 | Stop reason: HOSPADM

## 2023-07-02 RX ORDER — HALOPERIDOL 5 MG/ML
5 INJECTION INTRAMUSCULAR EVERY 4 HOURS PRN
Status: DISCONTINUED | OUTPATIENT
Start: 2023-07-02 | End: 2023-07-07 | Stop reason: HOSPADM

## 2023-07-02 RX ADMIN — HYDROXYZINE HYDROCHLORIDE 50 MG: 50 TABLET, FILM COATED ORAL at 19:53

## 2023-07-02 RX ADMIN — ZIPRASIDONE HYDROCHLORIDE 20 MG: 20 CAPSULE ORAL at 19:53

## 2023-07-02 RX ADMIN — POTASSIUM CHLORIDE 40 MEQ: 750 TABLET, EXTENDED RELEASE ORAL at 01:14

## 2023-07-02 RX ADMIN — TRAZODONE HYDROCHLORIDE 50 MG: 50 TABLET ORAL at 19:56

## 2023-07-02 ASSESSMENT — SLEEP AND FATIGUE QUESTIONNAIRES
DO YOU USE A SLEEP AID: NO
DO YOU HAVE DIFFICULTY SLEEPING: NO
AVERAGE NUMBER OF SLEEP HOURS: 7

## 2023-07-02 ASSESSMENT — ENCOUNTER SYMPTOMS
BACK PAIN: 0
ABDOMINAL PAIN: 0
SHORTNESS OF BREATH: 0

## 2023-07-02 ASSESSMENT — LIFESTYLE VARIABLES
HOW OFTEN DO YOU HAVE A DRINK CONTAINING ALCOHOL: NEVER
HOW MANY STANDARD DRINKS CONTAINING ALCOHOL DO YOU HAVE ON A TYPICAL DAY: PATIENT DOES NOT DRINK

## 2023-07-03 LAB
ALBUMIN SERPL-MCNC: 3.2 G/DL (ref 3.5–5)
ALBUMIN/GLOB SERPL: 1.1 (ref 1.1–2.2)
ALP SERPL-CCNC: 60 U/L (ref 45–117)
ALT SERPL-CCNC: 31 U/L (ref 12–78)
ANION GAP SERPL CALC-SCNC: 4 MMOL/L (ref 5–15)
AST SERPL-CCNC: 18 U/L (ref 15–37)
BILIRUB SERPL-MCNC: 0.2 MG/DL (ref 0.2–1)
BUN SERPL-MCNC: 5 MG/DL (ref 6–20)
BUN/CREAT SERPL: 7 (ref 12–20)
CALCIUM SERPL-MCNC: 8.5 MG/DL (ref 8.5–10.1)
CHLORIDE SERPL-SCNC: 111 MMOL/L (ref 97–108)
CHOLEST SERPL-MCNC: 178 MG/DL
CO2 SERPL-SCNC: 27 MMOL/L (ref 21–32)
CREAT SERPL-MCNC: 0.73 MG/DL (ref 0.55–1.02)
EKG ATRIAL RATE: 76 BPM
EKG DIAGNOSIS: NORMAL
EKG P AXIS: 14 DEGREES
EKG P-R INTERVAL: 174 MS
EKG Q-T INTERVAL: 424 MS
EKG QRS DURATION: 106 MS
EKG QTC CALCULATION (BAZETT): 477 MS
EKG R AXIS: 1 DEGREES
EKG T AXIS: 4 DEGREES
EKG VENTRICULAR RATE: 76 BPM
EST. AVERAGE GLUCOSE BLD GHB EST-MCNC: 85 MG/DL
GLOBULIN SER CALC-MCNC: 3 G/DL (ref 2–4)
GLUCOSE SERPL-MCNC: 110 MG/DL (ref 65–100)
HBA1C MFR BLD: 4.6 % (ref 4–5.6)
HDLC SERPL-MCNC: 37 MG/DL
HDLC SERPL: 4.8 (ref 0–5)
LDLC SERPL CALC-MCNC: 116.2 MG/DL (ref 0–100)
MAGNESIUM SERPL-MCNC: 2.1 MG/DL (ref 1.6–2.4)
POTASSIUM SERPL-SCNC: 3.2 MMOL/L (ref 3.5–5.1)
PROT SERPL-MCNC: 6.2 G/DL (ref 6.4–8.2)
SODIUM SERPL-SCNC: 142 MMOL/L (ref 136–145)
TRIGL SERPL-MCNC: 124 MG/DL
TSH SERPL DL<=0.05 MIU/L-ACNC: 0.97 UIU/ML (ref 0.36–3.74)
VLDLC SERPL CALC-MCNC: 24.8 MG/DL

## 2023-07-03 PROCEDURE — 83735 ASSAY OF MAGNESIUM: CPT

## 2023-07-03 PROCEDURE — 84443 ASSAY THYROID STIM HORMONE: CPT

## 2023-07-03 PROCEDURE — 1240000000 HC EMOTIONAL WELLNESS R&B

## 2023-07-03 PROCEDURE — 6370000000 HC RX 637 (ALT 250 FOR IP): Performed by: NURSE PRACTITIONER

## 2023-07-03 PROCEDURE — 80053 COMPREHEN METABOLIC PANEL: CPT

## 2023-07-03 PROCEDURE — 6370000000 HC RX 637 (ALT 250 FOR IP)

## 2023-07-03 PROCEDURE — 36415 COLL VENOUS BLD VENIPUNCTURE: CPT

## 2023-07-03 PROCEDURE — 6360000002 HC RX W HCPCS: Performed by: NURSE PRACTITIONER

## 2023-07-03 PROCEDURE — 80061 LIPID PANEL: CPT

## 2023-07-03 PROCEDURE — 83036 HEMOGLOBIN GLYCOSYLATED A1C: CPT

## 2023-07-03 RX ORDER — ZIPRASIDONE HYDROCHLORIDE 40 MG/1
40 CAPSULE ORAL 2 TIMES DAILY WITH MEALS
Status: ON HOLD | COMMUNITY
End: 2023-07-06 | Stop reason: HOSPADM

## 2023-07-03 RX ORDER — BUPRENORPHINE HYDROCHLORIDE AND NALOXONE HYDROCHLORIDE DIHYDRATE 8; 2 MG/1; MG/1
1 TABLET SUBLINGUAL DAILY
Status: DISCONTINUED | OUTPATIENT
Start: 2023-07-03 | End: 2023-07-07 | Stop reason: HOSPADM

## 2023-07-03 RX ORDER — TRAZODONE HYDROCHLORIDE 50 MG/1
50 TABLET ORAL NIGHTLY
Status: ON HOLD | COMMUNITY
End: 2023-07-06 | Stop reason: HOSPADM

## 2023-07-03 RX ORDER — ASPIRIN 81 MG/1
81 TABLET ORAL DAILY
COMMUNITY

## 2023-07-03 RX ORDER — POTASSIUM CHLORIDE 750 MG/1
40 TABLET, FILM COATED, EXTENDED RELEASE ORAL ONCE
Status: COMPLETED | OUTPATIENT
Start: 2023-07-03 | End: 2023-07-03

## 2023-07-03 RX ADMIN — SERTRALINE HYDROCHLORIDE 50 MG: 50 TABLET ORAL at 09:13

## 2023-07-03 RX ADMIN — ZIPRASIDONE HYDROCHLORIDE 20 MG: 20 CAPSULE ORAL at 09:12

## 2023-07-03 RX ADMIN — BUPRENORPHINE AND NALOXONE 1 TABLET: 8; 2 TABLET SUBLINGUAL at 17:02

## 2023-07-03 RX ADMIN — CLONAZEPAM 0.5 MG: 0.5 TABLET ORAL at 14:37

## 2023-07-03 RX ADMIN — ASPIRIN 81 MG CHEWABLE TABLET 81 MG: 81 TABLET CHEWABLE at 09:13

## 2023-07-03 RX ADMIN — POTASSIUM CHLORIDE 40 MEQ: 750 TABLET, FILM COATED, EXTENDED RELEASE ORAL at 09:13

## 2023-07-03 RX ADMIN — CLONAZEPAM 0.5 MG: 0.5 TABLET ORAL at 20:48

## 2023-07-03 RX ADMIN — ZIPRASIDONE HYDROCHLORIDE 20 MG: 20 CAPSULE ORAL at 17:01

## 2023-07-03 RX ADMIN — TRAZODONE HYDROCHLORIDE 50 MG: 50 TABLET ORAL at 20:46

## 2023-07-03 RX ADMIN — HYDROXYZINE HYDROCHLORIDE 50 MG: 50 TABLET, FILM COATED ORAL at 10:10

## 2023-07-03 ASSESSMENT — PAIN - FUNCTIONAL ASSESSMENT
PAIN_FUNCTIONAL_ASSESSMENT: NONE - DENIES PAIN
PAIN_FUNCTIONAL_ASSESSMENT: NONE - DENIES PAIN

## 2023-07-03 NOTE — DISCHARGE INSTRUCTIONS
DISCHARGE SUMMARY    NAME:Luli Aezvedo  : 1985  MRN: [id number]    The patient Leo Chan exhibits the ability to control behavior in a less restrictive environment. Patient's level of functioning is improving. No assaultive/destructive behavior has been observed for the past 24 hours. No suicidal/homicidal threat or behavior has been observed for the past 24 hours. There is no evidence of serious medication side effects. Patient has not been in physical or protective restraints for at least the past 24 hours. If weapons involved, how are they secured? None    Is patient aware of and in agreement with discharge plan? Yes    Arrangements for medication:  Prescriptions filled through [address] [place] [address], 30 day supply provided    Copy of discharge instructions to provider?:  Yes    Arrangements for transportation home: [place]    Keep all follow up appointments as scheduled, continue to take prescribed medications per physician instructions.   Mental health crisis number:  [phone] or your local mental health crisis line number at [facility] at [address] [facility] WARM LINE      [phone] ([phone])      M-F: 9am to 9pm      Sat & Sun: 5pm - 9pm  National suicide prevention lines:                             6-889-CGAOMZW (8-562-361-9871)       8-391-214-TALK ([phone])    Crisis Text Line:  Text HOME to 284440

## 2023-07-03 NOTE — PLAN OF CARE
Problem: Depression  Goal: Will be euthymic at discharge  Description: INTERVENTIONS:  1. Administer medication as ordered  2. Provide emotional support via 1:1 interaction with staff  3. Encourage involvement in milieu/groups/activities  4. Monitor for social isolation  Outcome: Progressing  Note: Denies SI, reports feeling more confident in her ability to maintain her safety. Problem: Psychosis  Goal: Will report no hallucinations or delusions  Description: INTERVENTIONS:  1. Administer medication as  ordered  2. Assist with reality testing to support increasing orientation  3. Assess if patient's hallucinations or delusions are encouraging self harm or harm to others and intervene as appropriate  Outcome: Progressing  Note: Describes racing intrusive thoughts have decreased however noted appears to be slightly distracted, preoccupied and delay in processing and responding to nsg assessment questions and discussing d/c goals.

## 2023-07-03 NOTE — INTERDISCIPLINARY ROUNDS
Behavioral Health Interdisciplinary Rounds     Patient Name: Sivakumar Masterson  Age: 42 y.o. Room/Bed:  726/02  Primary Diagnosis: Adjustment disorder   Admission Status: Voluntary    Readmission within 30 days: No  Power of  in place: No  Patient requires a blocked bed: No            Sleep hours: 7       Participation in Care/Groups:  Yes  Medication Compliant?: Yes  PRNS (last 24 hours): Antianxiety and Sleep Aid   Restraints (last 24 hours):  No  __________________________________________________  OQ Admission Analysis Survey completed:  OQ Admission Analysis Survey score:  __________________________________________________     Alcohol screening (AUDIT) completed -     If applicable, date SBIRT discussed in treatment team AND documented:    Tobacco - patient is a smoker:    Illegal Drugs use:      24 hour chart check complete: Yes    _______________________________________________    Patient goal(s) for today:   Treatment team focus/goals:   Progress note: Patient met with treatment team and appeared with a depressed and flat mood and affect. Patient denies SI/HI and [facility] at this time, reports fair sleep last night. Patient reporting that anxiety is improving since time of admission. Patient is tolerating medications well, no side effects voiced at this time. EKG completed and no abnormalities noted at this time. Plan to restart Suboxone. SW to refer to C[facility], plan to refer for psychiatric and therapy services.      Spiritual Care Consult:   Financial concerns/prescription coverage:    Family contact:                        Family requesting physician contact today:    Discharge plan:   Access to weapons :                                                              Outpatient provider(s):   Patient's preferred phone number for follow up call :   Patient's preferred e-mail address :    LOS:  1  Expected LOS: 5-7    Participating treatment team members: Sivakumar Masterson, BRIAN Ferrara, Sandy Stovall RN,

## 2023-07-04 PROCEDURE — 6370000000 HC RX 637 (ALT 250 FOR IP)

## 2023-07-04 PROCEDURE — 6370000000 HC RX 637 (ALT 250 FOR IP): Performed by: NURSE PRACTITIONER

## 2023-07-04 PROCEDURE — 6360000002 HC RX W HCPCS: Performed by: NURSE PRACTITIONER

## 2023-07-04 PROCEDURE — 1240000000 HC EMOTIONAL WELLNESS R&B

## 2023-07-04 RX ORDER — ZIPRASIDONE HYDROCHLORIDE 20 MG/1
40 CAPSULE ORAL 2 TIMES DAILY WITH MEALS
Status: DISCONTINUED | OUTPATIENT
Start: 2023-07-04 | End: 2023-07-07 | Stop reason: HOSPADM

## 2023-07-04 RX ORDER — BUSPIRONE HYDROCHLORIDE 10 MG/1
10 TABLET ORAL 2 TIMES DAILY
Status: DISCONTINUED | OUTPATIENT
Start: 2023-07-04 | End: 2023-07-05

## 2023-07-04 RX ADMIN — ZIPRASIDONE HYDROCHLORIDE 40 MG: 20 CAPSULE ORAL at 16:37

## 2023-07-04 RX ADMIN — SERTRALINE 100 MG: 50 TABLET, FILM COATED ORAL at 08:27

## 2023-07-04 RX ADMIN — BUSPIRONE HYDROCHLORIDE 10 MG: 10 TABLET ORAL at 13:22

## 2023-07-04 RX ADMIN — ASPIRIN 81 MG CHEWABLE TABLET 81 MG: 81 TABLET CHEWABLE at 08:27

## 2023-07-04 RX ADMIN — ZIPRASIDONE HYDROCHLORIDE 20 MG: 20 CAPSULE ORAL at 08:27

## 2023-07-04 RX ADMIN — CLONAZEPAM 0.5 MG: 0.5 TABLET ORAL at 20:34

## 2023-07-04 RX ADMIN — BUSPIRONE HYDROCHLORIDE 10 MG: 10 TABLET ORAL at 19:22

## 2023-07-04 RX ADMIN — BUPRENORPHINE AND NALOXONE 1 TABLET: 8; 2 TABLET SUBLINGUAL at 08:27

## 2023-07-04 NOTE — PROGRESS NOTES
SPIRITUALITY GROUP      Meeting Topic: Methods of Spiritual Pathways    Meeting Time:  45-60 minutes    Meeting Goal: Patients will describe personal definition of spirituality. Group will identify how spirituality or [employer] beliefs are relevant to their mental health. Joelle Talavera will discuss various approaches to making spiritual connections and invite the group to explore a new spiritual practice. Today meeting focus: Spiritual connections in [employer] attended and participated in the group appropriately respective of their current diagnosis. For additional spiritual care, please contact the  on-call at ([phone]). Rev.  Juana Stock MDiv, MS, [facility]  Staff

## 2023-07-04 NOTE — PLAN OF CARE
Problem: Safety - Adult  Goal: Free from fall injury  Outcome: Progressing     Received patient in bed. Appears to be sleeping, no signs of stress. Respirations even and unlabored. Will continue to monitor with q15min safety rounds.

## 2023-07-04 NOTE — PLAN OF CARE
Problem: Depression  Goal: Will be euthymic at discharge  Description: INTERVENTIONS:  1. Administer medication as ordered  2. Provide emotional support via 1:1 interaction with staff  3. Encourage involvement in milieu/groups/activities  4.  Monitor for social isolation  7/4/2023 1445 by Lew Olivo RN  Outcome: Progressing

## 2023-07-04 NOTE — PLAN OF CARE
[address] [facility]  Master Treatment Plan for Jasiel Pantoja Treatment Plan Initiated: 7/4/23    Treatment Plan Modalities:  Type of Modality Amount  (x minutes) Frequency (x/week) Duration (x days) Name of Responsible Staff   [address] [facility],8W meetings to encourage peer interactions 15 7 1   Frank, [address] [facility] psychotherapy to assist in building coping skills and internal controls 60 7 1 Chloe [facility]is [facility]   Therapeutic activity groups to build coping skills 61 7 1 Chloe [facility]is [facility]   Psychoeducation in group setting to address:   Medication education   [address] [place] [address]   Relaxation techniques         Symptom management         Discharge planning   60 2 1 Maile Hawkins   Spirituality    61 2 1 Chaplain JACKSON   61 1 1 [facility]/AA/NA         Physician medication management   13 7 1 Dr. Alva Trevizo   Family meeting/discharge planning   15 2 1 Shanell Ness                                   Problem: Depression  Goal: Will be euthymic at discharge  Description: INTERVENTIONS:  1. Administer medication as ordered  2. Provide emotional support via 1:1 interaction with staff  3. Encourage involvement in milieu/groups/activities  4. Monitor for social isolation  Outcome: Progressing  Note: Pt participated in treatment team. Out on the unit for small periods of time. Took a shower during the shift. Stated she is having passive SI. Mood is \" so so\". Ate breakfast during the shift. Complaining of anxiety during the shift.

## 2023-07-04 NOTE — INTERDISCIPLINARY ROUNDS
Behavioral Health Interdisciplinary Rounds     Patient Name: Cora Orantes  Age: 42 y.o. Room/Bed:  725/  Primary Diagnosis: Adjustment disorder   Admission Status:  Voluntary      Readmission within 30 days: no  Power of  in place: no  Patient requires a blocked bed: no          Reason for blocked bed:       Flu Vaccine : no   Consults:          Labs/Testing due today? Have they refused labs?: no    Sleep hours:  7+      Participation in Care/Groups:   Yes  Medication Compliant?:  Yes  PRNS (last 24 hours): Antianxiety, sleep     Restraints (last 24 hours):  no     CIWA (range last 24 hours):     COWS (range last 24 hours):      Alcohol screening (AUDIT) completed -         If applicable, date SBIRT discussed in treatment team AND documented:   AUDIT Screen Score:        Document Brief Intervention (corresponds directly with the 5 A's, Ask, Advise, Assess, Assist, and Arrange): At- Risk Patients (Score 7-15 for women; 8-15 for men)  Discuss concern patient is drinking at unhealthy levels known to increase risk of alcohol-related health problems. Is Patient ready to commit to change? If No:  Encourage reflection  Discuss short term and long term health risks of consuming alcohol  Barriers to change  Reaffirm willingness to help / Educational materials provided  If Yes:  Set goal  Plan  Educational materials provided    Harmful use or Dependence (Score 16 or greater)  Discuss short term and long term health risks of consuming alcohol  Recommendations  Negotiate drinking goal  Recommend addiction specialist/center  Arrange follow-up appointments.     Tobacco - patient is a smoker:    Illegal Drugs use:      24 hour chart check complete:  Yes    ____________________________________________________________________________________________________________    Patient goal(s) for today:   Treatment team focus/goals:   Progress note     LOS:  2  Expected LOS:     Financial concerns/prescription

## 2023-07-05 PROCEDURE — 6360000002 HC RX W HCPCS: Performed by: NURSE PRACTITIONER

## 2023-07-05 PROCEDURE — 6370000000 HC RX 637 (ALT 250 FOR IP): Performed by: NURSE PRACTITIONER

## 2023-07-05 PROCEDURE — 1240000000 HC EMOTIONAL WELLNESS R&B

## 2023-07-05 PROCEDURE — 6370000000 HC RX 637 (ALT 250 FOR IP)

## 2023-07-05 RX ADMIN — ZIPRASIDONE HYDROCHLORIDE 40 MG: 20 CAPSULE ORAL at 17:12

## 2023-07-05 RX ADMIN — ASPIRIN 81 MG CHEWABLE TABLET 81 MG: 81 TABLET CHEWABLE at 08:26

## 2023-07-05 RX ADMIN — BUPRENORPHINE AND NALOXONE 1 TABLET: 8; 2 TABLET SUBLINGUAL at 08:26

## 2023-07-05 RX ADMIN — ZIPRASIDONE HYDROCHLORIDE 40 MG: 20 CAPSULE ORAL at 08:26

## 2023-07-05 RX ADMIN — TRAZODONE HYDROCHLORIDE 50 MG: 50 TABLET ORAL at 20:44

## 2023-07-05 RX ADMIN — BUSPIRONE HYDROCHLORIDE 15 MG: 10 TABLET ORAL at 20:44

## 2023-07-05 RX ADMIN — CLONAZEPAM 0.5 MG: 0.5 TABLET ORAL at 20:44

## 2023-07-05 RX ADMIN — SERTRALINE 100 MG: 50 TABLET, FILM COATED ORAL at 08:26

## 2023-07-05 RX ADMIN — BUSPIRONE HYDROCHLORIDE 10 MG: 10 TABLET ORAL at 08:26

## 2023-07-05 NOTE — PLAN OF CARE
Problem: Psychosis  Goal: Will report no hallucinations or delusions  Description: INTERVENTIONS:  1. Administer medication as  ordered  2. Assist with reality testing to support increasing orientation  3. Assess if patient's hallucinations or delusions are encouraging self harm or harm to others and intervene as appropriate  Outcome: Progressing  Note: Out on unit for meals and meds only. Appears slightly preoccupied and or distracted. Reports AH has decreased, intrusive thoughts decreased, initial declined lab draw this am. Consented to lab, not able to obtain enough blood, will retry after pt is done eating meal     Problem: Depression  Goal: Will be euthymic at discharge  Description: INTERVENTIONS:  1. Administer medication as ordered  2. Provide emotional support via 1:1 interaction with staff  3. Encourage involvement in milieu/groups/activities  4.  Monitor for social isolation  Outcome: Progressing  Note: Denies SI, no self harming behaviors

## 2023-07-05 NOTE — INTERDISCIPLINARY ROUNDS
Behavioral Health Interdisciplinary Rounds     Patient Name: Tye Goldberg  Age: 42 y.o. Room/Bed:  5/  Primary Diagnosis: Adjustment disorder   Admission Status: Voluntary    Readmission within 30 days: No  Power of  in place: No  Patient requires a blocked bed: No          Reason for blocked bed:   Sleep hours: 7+       Participation in Care/Groups:  Yes  Medication Compliant?: Yes  PRNS (last 24 hours): Antianxiety   Restraints (last 24 hours):  No  __________________________________________________  OQ Admission Analysis Survey completed:  OQ Admission Analysis Survey score:  __________________________________________________     Alcohol screening (AUDIT) completed -     If applicable, date SBIRT discussed in treatment team AND documented:    Tobacco - patient is a smoker:    Illegal Drugs use:      24 hour chart check complete: Yes    _______________________________________________    Patient goal(s) for today:   Treatment team focus/goals:   Progress note: MARIAELENA faxed clinicals for residential CSU services to [facility]. MARIAELENA left message for [facility] to establish patient with therapy and psychiatry in addition to MAT. MARIAELENA discussed CSU options with patient, upon calling, insurance not accepted through [employer] or , referral not placed. Tentative discharge date for Friday. Patient endorsing fair mood but anxiety remains. Denies SI/HI and WOODS AT [facility].       Spiritual Care Consult:   Financial concerns/prescription coverage:    Family contact:                        Family requesting physician contact today:    Discharge plan:   Access to weapons :                                                              Outpatient provider(s):   Patient's preferred phone number for follow up call :   Patient's preferred e-mail address :    LOS:  3  Expected LOS: 5    Participating treatment team members: Tye Goldberg, BRIAN Escobar, Gaurav Edwards RN, Yany Tabares NP

## 2023-07-05 NOTE — PROGRESS NOTES
Pt seen 7/2/2023 for H&P    Potassium at [facility]:  3.0 on 7/1 (pt reportedly had potassium replacement at [facility])  Re-checked again:  3.2 on 7/3 - she received 40 mEq on 7/3    Awaiting todays labs for resolution

## 2023-07-05 NOTE — PLAN OF CARE
Problem: Psychosis  Goal: Will report no hallucinations or delusions  Description: INTERVENTIONS:  1. Administer medication as  ordered  2. Assist with reality testing to support increasing orientation  3. Assess if patient's hallucinations or delusions are encouraging self harm or harm to others and intervene as appropriate  7/5/2023 1701 by Thais Dwyer RN  Outcome: Progressing  Note: Thought organized, logical and goal directed. Mood hopeful. Affect brighter, noted smiling and increase time out on unit. Actively working on d/c plans and talking with Will Rich this afternoon. Denies SI, no plan, no intent and no urge to self harm. Staff focus is on offering support    1700: staff x 3 attempt lab draw with no success. Labs reorder for in am.   7   Problem: Depression  Goal: Will be euthymic at discharge  Description: INTERVENTIONS:  1. Administer medication as ordered  2. Provide emotional support via 1:1 interaction with staff  3. Encourage involvement in milieu/groups/activities  4.  Monitor for social isolation  7/5/2023 1701 by Thais Dwyer RN  Outcome: Progressing  7/5/2023 1152 by Thais Dwyer RN  Outcome: Progressing  Note: Denies SI, no self harming behaviors

## 2023-07-06 PROCEDURE — 6370000000 HC RX 637 (ALT 250 FOR IP)

## 2023-07-06 PROCEDURE — 1240000000 HC EMOTIONAL WELLNESS R&B

## 2023-07-06 PROCEDURE — 6360000002 HC RX W HCPCS: Performed by: NURSE PRACTITIONER

## 2023-07-06 PROCEDURE — 6370000000 HC RX 637 (ALT 250 FOR IP): Performed by: NURSE PRACTITIONER

## 2023-07-06 RX ORDER — ZIPRASIDONE HYDROCHLORIDE 40 MG/1
40 CAPSULE ORAL 2 TIMES DAILY WITH MEALS
Qty: 60 CAPSULE | Refills: 0 | Status: SHIPPED | OUTPATIENT
Start: 2023-07-06

## 2023-07-06 RX ORDER — TRAZODONE HYDROCHLORIDE 50 MG/1
50 TABLET ORAL NIGHTLY PRN
Qty: 30 TABLET | Refills: 0 | Status: SHIPPED | OUTPATIENT
Start: 2023-07-06

## 2023-07-06 RX ORDER — CLONAZEPAM 0.5 MG/1
0.5 TABLET ORAL DAILY PRN
Qty: 30 TABLET | Refills: 0 | Status: SHIPPED | OUTPATIENT
Start: 2023-07-06 | End: 2023-08-05

## 2023-07-06 RX ORDER — BUSPIRONE HYDROCHLORIDE 30 MG/1
30 TABLET ORAL 2 TIMES DAILY
Qty: 60 TABLET | Refills: 0 | Status: SHIPPED | OUTPATIENT
Start: 2023-07-06

## 2023-07-06 RX ORDER — BUSPIRONE HYDROCHLORIDE 10 MG/1
30 TABLET ORAL 2 TIMES DAILY
Status: DISCONTINUED | OUTPATIENT
Start: 2023-07-06 | End: 2023-07-07 | Stop reason: HOSPADM

## 2023-07-06 RX ORDER — BUPRENORPHINE HYDROCHLORIDE AND NALOXONE HYDROCHLORIDE DIHYDRATE 8; 2 MG/1; MG/1
1 TABLET SUBLINGUAL DAILY
Qty: 14 TABLET | Refills: 0 | Status: SHIPPED | OUTPATIENT
Start: 2023-07-07 | End: 2023-07-21

## 2023-07-06 RX ADMIN — ZIPRASIDONE HYDROCHLORIDE 40 MG: 20 CAPSULE ORAL at 16:56

## 2023-07-06 RX ADMIN — BUSPIRONE HYDROCHLORIDE 30 MG: 10 TABLET ORAL at 20:15

## 2023-07-06 RX ADMIN — SERTRALINE 100 MG: 50 TABLET, FILM COATED ORAL at 08:37

## 2023-07-06 RX ADMIN — ASPIRIN 81 MG CHEWABLE TABLET 81 MG: 81 TABLET CHEWABLE at 08:37

## 2023-07-06 RX ADMIN — ZIPRASIDONE HYDROCHLORIDE 40 MG: 20 CAPSULE ORAL at 08:37

## 2023-07-06 RX ADMIN — BUPRENORPHINE AND NALOXONE 1 TABLET: 8; 2 TABLET SUBLINGUAL at 08:37

## 2023-07-06 RX ADMIN — CLONAZEPAM 0.5 MG: 0.5 TABLET ORAL at 20:15

## 2023-07-06 RX ADMIN — BUSPIRONE HYDROCHLORIDE 15 MG: 10 TABLET ORAL at 08:37

## 2023-07-06 ASSESSMENT — PAIN SCALES - GENERAL: PAINLEVEL_OUTOF10: 0

## 2023-07-06 NOTE — PLAN OF CARE
Problem: Psychosis  Goal: Will report no hallucinations or delusions  Description: INTERVENTIONS:  1. Administer medication as  ordered  2. Assist with reality testing to support increasing orientation  3. Assess if patient's hallucinations or delusions are encouraging self harm or harm to others and intervene as appropriate  7/6/2023 1226 by Ashley Guerrero RN  Outcome: Progressing  Note: Out on unit brighter affect, thoughts organzied, denies AH, anxiety is elevated however tolerable with medications. Buspar adjusted. Staff focus is on offering support and reassurance. 7/6/2023 0250 by Simone Gould RN  Outcome: Progressing     Problem: Depression  Goal: Will be euthymic at discharge  Description: INTERVENTIONS:  1. Administer medication as ordered  2. Provide emotional support via 1:1 interaction with staff  3. Encourage involvement in milieu/groups/activities  4.  Monitor for social isolation  Outcome: Progressing

## 2023-07-06 NOTE — PROGRESS NOTES
Pt seen 7/2/2023 for H&P    Potassium at [facility]:  3.0 on 7/1 (pt reportedly had potassium replacement at [facility])  Re-checked again:  3.2 on 7/3 - she received 40 mEq on 7/3    7/6: Nursing unable to obtain labs after multiple attempts. Pt is eating and drinking well. Hospitalists will sign off at this time, please re-consult if further medical needs arise.

## 2023-07-06 NOTE — INTERDISCIPLINARY ROUNDS
Behavioral Health Interdisciplinary Rounds     Patient Name: Ester Morel  Age: 42 y.o. Room/Bed:  729/  Primary Diagnosis: Adjustment disorder   Admission Status: Voluntary    Readmission within 30 days: No  Power of  in place: No  Patient requires a blocked bed: No          Reason for blocked bed:   Sleep hours: 7+       Participation in Care/Groups:  Yes  Medication Compliant?: Yes  PRNS (last 24 hours): Antianxiety and Sleep Aid   Restraints (last 24 hours):  No  __________________________________________________  OQ Admission Analysis Survey completed:  OQ Admission Analysis Survey score:  __________________________________________________     Alcohol screening (AUDIT) completed -     If applicable, date SBIRT discussed in treatment team AND documented:    Tobacco - patient is a smoker:    Illegal Drugs use:      24 hour chart check complete: Yes    _______________________________________________    Patient goal(s) for today:   Treatment team focus/goals:   Progress note: Patient met with treatment team and appeared with a fair and pleasant mood and affect. Patient denies SI/HI and WOODS AT [facility], reports anxiety continues to remain high. Plan to increase 2 medications. MARIAELENA coordinating CSU services, referral sent to [facility] for discharge tomorrow. Plan to repeat EKG.      Spiritual Care Consult:   Financial concerns/prescription coverage:    Family contact:                        Family requesting physician contact today:    Discharge plan:   Access to weapons :                                                              Outpatient provider(s):     LOS:  4  Expected LOS: 5    Participating treatment team members: Ester AcevedoersNegro MSW, Cesar Womack RN, Viktoriya Celeste, MICHELLE

## 2023-07-06 NOTE — PLAN OF CARE
Problem: Safety - Adult  Goal: Free from fall injury  Outcome: Progressing     Problem: Psychosis  Goal: Will report no hallucinations or delusions  Description: INTERVENTIONS:  1. Administer medication as  ordered  2. Assist with reality testing to support increasing orientation  3.  Assess if patient's hallucinations or delusions are encouraging self harm or harm to others and intervene as appropriate  7/6/2023 0250 by Pj Campbell RN  Outcome: Progressing

## 2023-07-07 VITALS
HEART RATE: 77 BPM | SYSTOLIC BLOOD PRESSURE: 122 MMHG | WEIGHT: 240 LBS | OXYGEN SATURATION: 98 % | DIASTOLIC BLOOD PRESSURE: 82 MMHG | HEIGHT: 62 IN | RESPIRATION RATE: 16 BRPM | BODY MASS INDEX: 44.16 KG/M2 | TEMPERATURE: 98 F

## 2023-07-07 PROCEDURE — 6370000000 HC RX 637 (ALT 250 FOR IP): Performed by: NURSE PRACTITIONER

## 2023-07-07 PROCEDURE — 6360000002 HC RX W HCPCS: Performed by: NURSE PRACTITIONER

## 2023-07-07 PROCEDURE — 6370000000 HC RX 637 (ALT 250 FOR IP)

## 2023-07-07 RX ADMIN — ASPIRIN 81 MG CHEWABLE TABLET 81 MG: 81 TABLET CHEWABLE at 08:55

## 2023-07-07 RX ADMIN — BUSPIRONE HYDROCHLORIDE 30 MG: 10 TABLET ORAL at 08:55

## 2023-07-07 RX ADMIN — BUPRENORPHINE AND NALOXONE 1 TABLET: 8; 2 TABLET SUBLINGUAL at 08:55

## 2023-07-07 RX ADMIN — SERTRALINE 150 MG: 50 TABLET, FILM COATED ORAL at 08:55

## 2023-07-07 RX ADMIN — ZIPRASIDONE HYDROCHLORIDE 40 MG: 20 CAPSULE ORAL at 08:55

## 2023-07-07 ASSESSMENT — PAIN SCALES - GENERAL: PAINLEVEL_OUTOF10: 0

## 2023-07-07 NOTE — BH NOTE
Attempted blood collection twice, unsuccessful. Encouraged pt to drink fluid.  Retimed for AM.
Behavioral Health Interdisciplinary Rounds     Patient Name: Tye Goldberg  Age: 42 y.o. Room/Bed:  729/  Primary Diagnosis: Adjustment disorder   Admission Status: Voluntary      Readmission within 30 days: no  Power of  in place: no  Patient requires a blocked bed: no          Reason for blocked bed     Flu Vaccine :    Consults:       Labs/Testing due today? Have they refused labs?: No     Sleep hours:        Participation in Care/Groups:  Yes   Medication Compliant?: Yes  PRNS (last 24 hours):     Restraints (last 24 hours):  No      CIWA (range last 24 hours):     COWS (range last 24 hours):      Alcohol screening (AUDIT) completed -         If applicable, date SBIRT discussed in treatment team AND documented:   AUDIT Screen Score:      Document Brief Intervention (corresponds directly with the 5 A's, Ask, Advise, Assess, Assist, and Arrange):  assess     At- Risk Patients (Score 7-15 for women; 8-15 for men)  Discuss concern patient is drinking at unhealthy levels known to increase risk of alcohol-related health problems. Is Patient ready to commit to change? Yes     If No:  Encourage reflection  Discuss short term and long term health risks of consuming alcohol  Barriers to change  Reaffirm willingness to help / Educational materials provided  If Yes:  Set goal  Plan  Educational materials provided    Harmful use or Dependence (Score 16 or greater)  Discuss short term and long term health risks of consuming alcohol  Recommendations  Negotiate drinking goal  Recommend addiction specialist/center  Arrange follow-up appointments.     Tobacco - patient is a smoker:    Illegal Drugs use:    24 hour chart check complete: Yes   ____________________________________________________________________________________________________________    Patient goal(s) for today:   Treatment team focus/goals:   Progress note     LOS:  5  Expected LOS:     Financial concerns/prescription coverage:    Family contact:
GROUP THERAPY PROGRESS NOTE    Patient is participating in Safety Planning group. Group time: 15-30 minutes    Personal goal for participation: To complete safety plan     Goal orientation: Personal    Group therapy participation: Active     Therapeutic interventions reviewed and discussed:  Group members were supported in filling out safety plans. Pts are able to develop and document a strategy to remain safe after discharge. SW provided feedback about questions/concerns of pts. Pts returned safety plans when completed. Impression of participation:  SW provided safety plan to pt to be completed independently.     BRIAN Love, HP-A
GROUP THERAPY PROGRESS NOTE  Pt did not participate in [facility] psychoeducation group.    Topic: Anxiety  Marleen Hernandez, MSW, QMHP-A
PRN Medication Documentation    Specific patient behavior that led to need for PRN medication: Patient requested medication for increased anxiety and other medication to assist with sleeping. Staff interventions attempted prior to PRN being given: Advised patient to utilize coping skills and offered PRN medications. PRN medication given: Trazodone and Klonapin. Patient response/effectiveness of PRN medication: Will continue to monitor.
PSYCHOSOCIAL ASSESSMENT  :Patient identifying info:   Candis Clark is a 42 y.o., female admitted 2023  9:31 AM     Presenting problem and precipitating factors: Pt reported to ED due to SI with plan to cut her wrists. Pt reports feeling this way for a few weeks. Pt reports that she does not feel safe. Pt denies HI but endorses auditory command hallucinations     Mental status assessment:     Strengths/Recreation/Coping Skills:    Collateral information: Joseluis Judd, [phone]    Current psychiatric /substance abuse providers and contact info[facility]     Previous psychiatric/substance abuse providers and response to treatment: Pt reports previous admissions. Family history of mental illness or substance abuse: unknown     Substance abuse history:  Meth, heroin, cocaine; last use  2023  Social History     Tobacco Use    Smoking status: Former    Smokeless tobacco: Never   Substance Use Topics    Alcohol use: Not Currently       History of biomedical complications associated with substance abuse: none    Patient's current acceptance of treatment or motivation for change: voluntary     Family constellation: single, children not in her care     Is significant other involved? No     Describe support system: pt denies support.     Describe living arrangements and home environment: Pt lives in [address] [place] [address] Road: No    Guardian Name:     Guardian Contact:     Health issues:     Trauma history: yes    Legal issues: no     History of  service: no     Financial status: unknown     Christianity/cultural factors: not reported     Education/work history: Unemployed    Have you been licensed as a health care professional (current or ): no     Describe coping skills: ineffectual     Lamar Mcmilaln  7/3/2023
Patient is anxious following treatment team. She has had an atarax for anxiety at 1010.  Klonopin 0.5 mg administered for anxiety second line PRN
Transfer Note    Patient transferred from acute to general unit.
Q4H PRN Darrian Alexanderpiter, APRN - NP        traZODone (DESYREL) tablet 50 mg  50 mg Oral Nightly PRN Darrian Tovar APRN - NP   50 mg at 07/05/23 2044    clonazePAM (KLONOPIN) tablet 0.5 mg  0.5 mg Oral BID PRN Darrian Tovar APRN - NP   0.5 mg at 07/05/23 2044    aspirin chewable tablet 81 mg  81 mg Oral Daily Tona Chino APRN - NP   81 mg at 07/06/23 9228     Mental Status Exam:  Hemanth Mullins is a 42 y.o. female who is moderately groomed, appears stated age, dressed in hospital gown. Eye contact is fair. Poor dentation observed. Psychomotor activity is WNL; no symptoms of tardive dyskinesia or other EPS observed or reported. Speech is spontaneous, normal rate, volume, prosody;   Mood is \"OK\"  Affect: guarded  Perception: no AVH, no evidence of psychotic processes  Thought process: Linear, logical, organized  Thought content: denies SI/plan/intent, plans/intents denies HI/plan/intent  Sensorium: awake, alert, oriented x 4   Insight/judgment: intact  Attention/concentration: fair  Cognition is grossly intact    Assessment and Plan:  Hemanth Mullins meets criteria for a diagnosis of:  Mood disorder NOS;   MASSIEL;   PTSD;   opioid use disorder, on partial agonist therapy;   alcohol use disorder, in sustained remission;   methamphetamine use disorder, in early remission. 7/6/23- EKG not obtained yesterday, have reordered to monitor QTc in the context of increasing Geodon dose. Increased Buspar to 30mg BID, increased Zoloft to 150mg (pt was on 200mg previously, had been off for 10 days prior to admission). Disposition planning to continue, plans is for pt to admit to [facility].     7/5/23- Will obtain EKG to monitor QTc. Baseline seems to be around 480. If QTc increases with Geodon, will discontinue and change to different agent. Increasing Buspar to 15mg BID for anxiety. 7/4/23- Increasing Geodon to 40mg BID (total dose today will be 60mg as pt has already received AM dose of 20mg).  Will continue to monitor
early remission. 7/3/23- Increasing Zoloft to 100mg daily, for today will keep Geodon at 20mg BID but will likely increase in the coming days. EKG obtained this morning, QTc 477 (after being off Geodon for 10 days), past EKGs from 2022 show QTc consistently around 480. Restarting suboxone at 8-2 once daily. A coordinated, multidisplinary treatment team round was conducted with the patient, nurses, pharmcist,  and writer present. Discussions held with , and/or with family members; Complete current electronic health record for patient was reviewed in full including consultant notes, ancillary staff notes, nurses and tech notes, labs and vitals. I certify that this patients inpatient psychiatric hospital services furnished since the previous certification were, and continue to be, required for treatment that could reasonably be expected to improve the patient's condition, or for diagnostic study, and that the patient continues to need, on a daily basis, active treatment furnished directly by or requiring the supervision of inpatient psychiatric facility personnel. In addition, the hospital records show that services furnished were intensive treatment services, admission or related services, or equivalent services.
plans/intents denies HI/plan/intent  Sensorium: awake, alert, oriented x 4   Insight/judgment: intact  Attention/concentration: fair  Cognition is grossly intact    Assessment and Plan:  Evi Martin meets criteria for a diagnosis of:  Mood disorder NOS;   MASSIEL;   PTSD;   opioid use disorder, on partial agonist therapy;   alcohol use disorder, in sustained remission;   methamphetamine use disorder, in early remission. 7/4/23- Increasing Geodon to 40mg BID (total dose today will be 60mg as pt has already received AM dose of 20mg). Will continue to monitor EKG as we increase. Adding Buspar 10mg BID for anxiety. 7/3/23- Increasing Zoloft to 100mg daily, for today will keep Geodon at 20mg BID but will likely increase in the coming days. EKG obtained this morning, QTc 477 (after being off Geodon for 10 days), past EKGs from 2022 show QTc consistently around 480. Restarting suboxone at 8-2 once daily. A coordinated, multidisplinary treatment team round was conducted with the patient, nurses, pharmcist,  and writer present. Discussions held with , and/or with family members; Complete current electronic health record for patient was reviewed in full including consultant notes, ancillary staff notes, nurses and tech notes, labs and vitals. I certify that this patients inpatient psychiatric hospital services furnished since the previous certification were, and continue to be, required for treatment that could reasonably be expected to improve the patient's condition, or for diagnostic study, and that the patient continues to need, on a daily basis, active treatment furnished directly by or requiring the supervision of inpatient psychiatric facility personnel. In addition, the hospital records show that services furnished were intensive treatment services, admission or related services, or equivalent services.
notes, nurses and tech notes, labs and vitals. I certify that this patients inpatient psychiatric hospital services furnished since the previous certification were, and continue to be, required for treatment that could reasonably be expected to improve the patient's condition, or for diagnostic study, and that the patient continues to need, on a daily basis, active treatment furnished directly by or requiring the supervision of inpatient psychiatric facility personnel. In addition, the hospital records show that services furnished were intensive treatment services, admission or related services, or equivalent services.
Follow up With Specialties Details Why Contact Info    IHSAN Merchant NP Nurse Practitioner   [address]  [address]      [address] on 7/12/2023 Go to for outpatient services, [address],   [place], [address]    Phone: [phone]  Fax: [phone] on 7/7/2023 Go to for residential crisis stabilization services, go to [address] [place] [address]. [address] [address]. Residential Crisis Stabilization (RCS) Services are short term acute crisis supports that last from 5 to 15 days. These services are for individuals who may be experiencing crisis due to their mental health diagnosis. The purpose of these services is to be a buffer between crisis and hospitalization, serve as a step down service following an inpatient hospitalization, as well as provide assistance with identifying community resources to ensure ongoing stabilization. These services are provided in residential facilities that offers 24-hour supervision and support. [address] [facility], [address] [place] [address]    Phone: [address] [facility]  Call on 7/19/2023 8AM intake, please expect to be here for 2-4hrs, follow-up to continue medication assisted treatment. Please bring your ID and list of medications and dosages. [facility], [address] [place] [address], [address] [facility] [address] [facility] [address]    Phone: [phone]  Fax: [phone]            Advanced Directive:   Does the patient have an appointed surrogate decision maker? No  Does the patient have a Medical Advance Directive? No  Does the patient have a Psychiatric Advance Directive? No  If the patient does not have a surrogate or Medical Advance Directive AND Psychiatric Advance Directive, the patient was offered information on these advance directives Refused    Patient Instructions: Please continue all medications until otherwise directed by physician.       Tobacco Cessation Discharge Plan:   Is the patient a smoker and needs

## 2023-07-07 NOTE — PLAN OF CARE
Problem: Psychosis  Goal: Will report no hallucinations or delusions  Description: INTERVENTIONS:  1. Administer medication as  ordered  2. Assist with reality testing to support increasing orientation  3. Assess if patient's hallucinations or delusions are encouraging self harm or harm to others and intervene as appropriate  Outcome: Progressing  Note: Thoughts organized, logical and goal directed. Problem: Depression  Goal: Will be euthymic at discharge  Description: INTERVENTIONS:  1. Administer medication as ordered  2. Provide emotional support via 1:1 interaction with staff  3. Encourage involvement in milieu/groups/activities  4.  Monitor for social isolation  Outcome: Progressing

## 2023-07-08 LAB
EKG ATRIAL RATE: 67 BPM
EKG DIAGNOSIS: NORMAL
EKG P AXIS: 29 DEGREES
EKG P-R INTERVAL: 178 MS
EKG Q-T INTERVAL: 422 MS
EKG QRS DURATION: 94 MS
EKG QTC CALCULATION (BAZETT): 445 MS
EKG R AXIS: 11 DEGREES
EKG T AXIS: 28 DEGREES
EKG VENTRICULAR RATE: 67 BPM

## 2023-07-10 ENCOUNTER — TELEPHONE (OUTPATIENT)
Facility: CLINIC | Age: 38
End: 2023-07-10

## 2023-07-10 DIAGNOSIS — F19.10 SUBSTANCE ABUSE (HCC): Primary | ICD-10-CM

## 2023-07-10 RX ORDER — GABAPENTIN 300 MG/1
CAPSULE ORAL
Qty: 60 CAPSULE | Refills: 2 | Status: SHIPPED | OUTPATIENT
Start: 2023-07-10 | End: 2024-07-10

## 2023-07-10 NOTE — TELEPHONE ENCOUNTER
Jr Rosenberg is requesting a refill, she has an appt on 7.24.23     gabapentin (NEURONTIN) 300 MG capsule

## 2023-07-10 NOTE — DISCHARGE SUMMARY
DISCHARGE SUMMARY  Some parts of the discharge summary are from the initial Psychiatric interview that was done on admission by the admitting psychiatrist.     Name: Hemanth Mullins  MR#: [id number]  Account#: [de-identified]  : 1985  Date of Admission: 2023    Date of Discharge: 7/10/2023     TYPE OF DISCHARGE:   REGULAR     INITIAL PSYCHIATRIC INTERVIEW:   CHIEF COMPLAINT: \"I was having suicidal thoughts. \"      HISTORY OF PRESENTING COMPLAINT:  This is a 42 y.o. female who is currently admitted to the acute psychiatric floor at [facility] on a voluntary basis for depression with suicidal thoughts of slitting her wrist. The pt was evaluated at [facility] on 23 but was denied admission, and was recommended to follow up with the [facility]. The pt reports that she has been off her medications for about a week and a half, and during that time her mood worsened. She feels that when she's on her medications, mood is well controlled. During assessment, the pt was calm and cooperative. She denies current SI, and reports this morning as the most recent time she was having SI. Denies HI/AVH. PAST PSYCHIATRIC HISTORY: The pt has a hx of bipolar disorder, PTSD, and polysubstance abuse. She has a hx of 4-5 past psychiatric hospitalizations, most recently 5/15/23-23 at [facility]. She was discharged to the [address] [place] [address]. She has a hx of one past suicide attempt 6 years ago by cutting, after she lost custody of her children. She goes to [facility] for her MAT (pt is on suboxone) but does not have any other psychiatrist, , or therapist. She does have a hx of perceptual disturbances but has not had them for several weeks. PAST MEDICATION TRIALS: Risperdal, Geodon, Gabapentin, Buspar, clonidine, Klonopin, Zoloft, Geodon     SUBSTANCE ABUSE HISTORY:  Etoh: etoh use disorder, heaving drinking from age 13 until 24.  She stopped on her own, never did inpatient/outpatient detox, no hx of withdrawal

## 2023-07-24 ENCOUNTER — OFFICE VISIT (OUTPATIENT)
Facility: CLINIC | Age: 38
End: 2023-07-24
Payer: COMMERCIAL

## 2023-07-24 VITALS
WEIGHT: 254 LBS | SYSTOLIC BLOOD PRESSURE: 112 MMHG | RESPIRATION RATE: 19 BRPM | HEIGHT: 62 IN | DIASTOLIC BLOOD PRESSURE: 70 MMHG | BODY MASS INDEX: 46.74 KG/M2 | OXYGEN SATURATION: 99 %

## 2023-07-24 DIAGNOSIS — F31.32 BIPOLAR AFFECTIVE DISORDER, CURRENTLY DEPRESSED, MODERATE (HCC): ICD-10-CM

## 2023-07-24 DIAGNOSIS — F19.11 HISTORY OF SUBSTANCE ABUSE (HCC): ICD-10-CM

## 2023-07-24 DIAGNOSIS — F43.23 ADJUSTMENT REACTION WITH ANXIETY AND DEPRESSION: ICD-10-CM

## 2023-07-24 DIAGNOSIS — F41.0 PANIC DISORDER: ICD-10-CM

## 2023-07-24 DIAGNOSIS — F19.10 POLYSUBSTANCE ABUSE (HCC): ICD-10-CM

## 2023-07-24 DIAGNOSIS — Z12.31 SCREENING MAMMOGRAM FOR BREAST CANCER: ICD-10-CM

## 2023-07-24 DIAGNOSIS — F19.11 HISTORY OF SUBSTANCE ABUSE (HCC): Primary | ICD-10-CM

## 2023-07-24 PROBLEM — F11.10 OPIOID USE DISORDER, MILD, ABUSE (HCC): Status: RESOLVED | Noted: 2023-04-24 | Resolved: 2023-07-24

## 2023-07-24 PROBLEM — F39 MOOD DISORDER (HCC): Status: ACTIVE | Noted: 2017-06-25

## 2023-07-24 PROBLEM — I26.99 ACUTE PULMONARY EMBOLISM (HCC): Status: RESOLVED | Noted: 2022-05-09 | Resolved: 2023-07-24

## 2023-07-24 PROBLEM — F29 PSYCHOSIS (HCC): Status: RESOLVED | Noted: 2023-04-21 | Resolved: 2023-07-24

## 2023-07-24 PROBLEM — F15.20 AMPHETAMINE USE DISORDER, MODERATE (HCC): Status: RESOLVED | Noted: 2023-04-24 | Resolved: 2023-07-24

## 2023-07-24 PROBLEM — D50.9 IRON DEFICIENCY ANEMIA: Status: RESOLVED | Noted: 2022-08-22 | Resolved: 2023-07-24

## 2023-07-24 PROBLEM — F32.A DEPRESSION: Status: ACTIVE | Noted: 2017-06-25

## 2023-07-24 PROCEDURE — 99214 OFFICE O/P EST MOD 30 MIN: CPT | Performed by: NURSE PRACTITIONER

## 2023-07-24 RX ORDER — CLONAZEPAM 0.5 MG/1
0.5 TABLET ORAL DAILY PRN
Qty: 30 TABLET | Refills: 0 | OUTPATIENT
Start: 2023-07-24 | End: 2023-08-23

## 2023-07-24 RX ORDER — ZIPRASIDONE HYDROCHLORIDE 40 MG/1
40 CAPSULE ORAL 2 TIMES DAILY WITH MEALS
COMMUNITY
Start: 2023-05-30 | End: 2023-07-24 | Stop reason: SDUPTHER

## 2023-07-24 RX ORDER — ZIPRASIDONE HYDROCHLORIDE 40 MG/1
40 CAPSULE ORAL 2 TIMES DAILY WITH MEALS
Qty: 60 CAPSULE | Refills: 2 | Status: SHIPPED | OUTPATIENT
Start: 2023-07-24

## 2023-07-24 RX ORDER — CLONAZEPAM 0.5 MG/1
0.5 TABLET ORAL DAILY PRN
Qty: 30 TABLET | Refills: 0 | Status: SHIPPED | OUTPATIENT
Start: 2023-07-24 | End: 2023-10-22

## 2023-07-24 RX ORDER — TRAZODONE HYDROCHLORIDE 50 MG/1
50 TABLET ORAL NIGHTLY PRN
Qty: 30 TABLET | Refills: 1 | Status: SHIPPED | OUTPATIENT
Start: 2023-07-24

## 2023-07-24 ASSESSMENT — PATIENT HEALTH QUESTIONNAIRE - PHQ9
SUM OF ALL RESPONSES TO PHQ9 QUESTIONS 1 & 2: 0
2. FEELING DOWN, DEPRESSED OR HOPELESS: 0
SUM OF ALL RESPONSES TO PHQ QUESTIONS 1-9: 0
SUM OF ALL RESPONSES TO PHQ QUESTIONS 1-9: 0
1. LITTLE INTEREST OR PLEASURE IN DOING THINGS: 0
SUM OF ALL RESPONSES TO PHQ QUESTIONS 1-9: 0
SUM OF ALL RESPONSES TO PHQ QUESTIONS 1-9: 0

## 2023-07-24 NOTE — PROGRESS NOTES
Pt states that she has [address] and the psych provider she was seeing does not take this insurance. States that she was not given ay medication because of this, and that's why she ended up in the hospital. The hospital did give her enough to make it to this appointment, but she will be out soon, would like to know If you will write for medication until she can find another psyche provider. Pt is here for   Chief Complaint   Patient presents with    Follow-Up from TERRY JOHNSON     7/10/2023      1. Have you been to the ER, urgent care clinic since your last visit? Hospitalized since your last visit? Yes When: 7/10/2023 [address]    2. Have you seen or consulted any other health care providers outside of the [address] since your last visit? Include any pap smears or colon screening.  No
edema, no tenderness. No calf tenderness or edema. Neurological:  Alert, oriented to person, place and time. Skin: skin is warm and dry. Assessment/ Plan:     Follow-up and Dispositions    Return in about 2 months (around 9/24/2023) for in person, new med evaluation. NEEDS TO EST W PSYCH. Gave pt numbers of several groups to try including [facility]  Controlled sub contract signed, sent for scanning   checked, reduce benzo use      1. Panic disorder    - clonazePAM (KLONOPIN) 0.5 MG tablet; Take 1 tablet by mouth daily as needed for Anxiety for up to 90 days. Severe anxiety Max Daily Amount: 0.5 mg  Dispense: 30 tablet; Refill: 0    2. History of substance abuse ([address])    - [employer] [facility]; Future  - [address] [facility], [address] [place] [address] Road    3. Bipolar affective disorder, currently depressed, moderate ([address])    - [address] [facility], [facility]    4. Screening mammogram for breast cancer    - [employer] DIGITAL SCREEN W OR WO CAD BILATERAL; Future    5. Adjustment reaction with anxiety and depression    - clonazePAM (KLONOPIN) 0.5 MG tablet; Take 1 tablet by mouth daily as needed for Anxiety for up to 90 days. Severe anxiety Max Daily Amount: 0.5 mg  Dispense: 30 tablet; Refill: 0  - sertraline (ZOLOFT) 50 MG tablet; Take 3 tablets by mouth daily  Dispense: 90 tablet; Refill: 1  - ziprasidone (GEODON) 40 MG capsule; Take 1 capsule by mouth 2 times daily (with meals)  Dispense: 60 capsule; Refill: 2  - [address] [facility], [address] [place] [address] Road    6. Polysubstance abuse ([address])          I have discussed the diagnosis with the patient and the intended plan as seen in the above orders. The patient has received an after-visit summary and questions were answered concerning future plans. Pt conveyed understanding of plan.       Medication Side Effects and Warnings

## 2023-07-24 NOTE — TELEPHONE ENCOUNTER
----- Message from Ofelia Lerma sent at 7/24/2023  9:24 AM EDT -----  Subject: Refill Request    QUESTIONS  Name of Medication? clonazePAM (KLONOPIN) 0.5 MG tablet  Patient-reported dosage and instructions? 0.5 MG twice a day   How many days do you have left? 4  Preferred Pharmacy? [address] [id number]  Pharmacy phone number (if available)? [phone]  ---------------------------------------------------------------------------  --------------,  Name of Medication? sertraline (ZOLOFT) 50 MG tablet  Patient-reported dosage and instructions? 50 MG once three pills a day   How many days do you have left? 10  Preferred Pharmacy? [address] [facility] [id number]  Pharmacy phone number (if available)? [phone]  ---------------------------------------------------------------------------  --------------  CALL BACK INFO  What is the best way for the office to contact you? OK to leave message on   voicemail  Preferred Call Back Phone Number? [phone]  ---------------------------------------------------------------------------  --------------  SCRIPT ANSWERS  Relationship to Patient?  Self

## 2023-07-24 NOTE — PATIENT INSTRUCTIONS
[facility]:   [address] [facility], [address]  [phone]    [facility]  Address: [address] [place], [place] [address]e S, [place], [address] [place] [address]  Phone: [phone]    [facility]  [phone]    [facility]  Address: [address] [place] [address], [address]  Phone: [phone]

## 2023-07-29 LAB — DRUGS UR: NORMAL

## 2023-08-25 ENCOUNTER — TELEPHONE (OUTPATIENT)
Facility: CLINIC | Age: 38
End: 2023-08-25

## 2023-08-25 DIAGNOSIS — F43.23 ADJUSTMENT REACTION WITH ANXIETY AND DEPRESSION: ICD-10-CM

## 2023-08-25 DIAGNOSIS — F41.0 PANIC DISORDER: ICD-10-CM

## 2023-08-25 NOTE — TELEPHONE ENCOUNTER
Pt called for rx refill for clonazepam. She wants to take 2 per day instead of one since her stress level has increased  Send to [facility] on [address] # [phone]

## 2023-08-28 NOTE — TELEPHONE ENCOUNTER
----- Message from [address] [place] [address] sent at 8/28/2023 10:21 AM EDT -----  Subject: Refill Request    QUESTIONS  Name of Medication? clonazePAM (KLONOPIN) 0.5 MG tablet  Patient-reported dosage and instructions? 0.5mg, 1 po   How many days do you have left? 0  Preferred Pharmacy? [address] [facility] [id number]  Pharmacy phone number (if available)? [phone]  Additional Information for Provider? Pt would like this med increased to 1   mg qd or 0.5mg 2 qd. Pt states that 05mg isn't helping  ---------------------------------------------------------------------------  --------------  CALL BACK INFO  What is the best way for the office to contact you? OK to leave message on   voicemail  Preferred Call Back Phone Number? [phone]  ---------------------------------------------------------------------------  --------------  SCRIPT ANSWERS  Relationship to Patient?  Self

## 2023-08-29 ENCOUNTER — TELEMEDICINE (OUTPATIENT)
Facility: CLINIC | Age: 38
End: 2023-08-29
Payer: COMMERCIAL

## 2023-08-29 DIAGNOSIS — F41.0 PANIC DISORDER: ICD-10-CM

## 2023-08-29 DIAGNOSIS — F19.10 POLYSUBSTANCE ABUSE (HCC): Primary | ICD-10-CM

## 2023-08-29 DIAGNOSIS — F43.23 ADJUSTMENT REACTION WITH ANXIETY AND DEPRESSION: ICD-10-CM

## 2023-08-29 PROCEDURE — 99213 OFFICE O/P EST LOW 20 MIN: CPT | Performed by: NURSE PRACTITIONER

## 2023-08-29 RX ORDER — CLONAZEPAM 0.5 MG/1
0.5 TABLET ORAL DAILY PRN
Qty: 30 TABLET | Refills: 0 | Status: CANCELLED | OUTPATIENT
Start: 2023-08-29 | End: 2023-11-27

## 2023-08-29 RX ORDER — DIAZEPAM 5 MG/1
5 TABLET ORAL DAILY PRN
Qty: 30 TABLET | Refills: 0 | Status: SHIPPED | OUTPATIENT
Start: 2023-08-29 | End: 2023-09-28

## 2023-08-29 SDOH — ECONOMIC STABILITY: HOUSING INSECURITY
IN THE LAST 12 MONTHS, WAS THERE A TIME WHEN YOU DID NOT HAVE A STEADY PLACE TO SLEEP OR SLEPT IN A SHELTER (INCLUDING NOW)?: NO

## 2023-08-29 SDOH — ECONOMIC STABILITY: FOOD INSECURITY: WITHIN THE PAST 12 MONTHS, THE FOOD YOU BOUGHT JUST DIDN'T LAST AND YOU DIDN'T HAVE MONEY TO GET MORE.: NEVER TRUE

## 2023-08-29 SDOH — ECONOMIC STABILITY: INCOME INSECURITY: HOW HARD IS IT FOR YOU TO PAY FOR THE VERY BASICS LIKE FOOD, HOUSING, MEDICAL CARE, AND HEATING?: NOT HARD AT ALL

## 2023-08-29 SDOH — ECONOMIC STABILITY: FOOD INSECURITY: WITHIN THE PAST 12 MONTHS, YOU WORRIED THAT YOUR FOOD WOULD RUN OUT BEFORE YOU GOT MONEY TO BUY MORE.: NEVER TRUE

## 2023-08-29 ASSESSMENT — PATIENT HEALTH QUESTIONNAIRE - PHQ9
8. MOVING OR SPEAKING SO SLOWLY THAT OTHER PEOPLE COULD HAVE NOTICED. OR THE OPPOSITE, BEING SO FIGETY OR RESTLESS THAT YOU HAVE BEEN MOVING AROUND A LOT MORE THAN USUAL: 0
SUM OF ALL RESPONSES TO PHQ QUESTIONS 1-9: 3
SUM OF ALL RESPONSES TO PHQ QUESTIONS 1-9: 3
4. FEELING TIRED OR HAVING LITTLE ENERGY: 0
SUM OF ALL RESPONSES TO PHQ QUESTIONS 1-9: 3
SUM OF ALL RESPONSES TO PHQ QUESTIONS 1-9: 3
6. FEELING BAD ABOUT YOURSELF - OR THAT YOU ARE A FAILURE OR HAVE LET YOURSELF OR YOUR FAMILY DOWN: 0
10. IF YOU CHECKED OFF ANY PROBLEMS, HOW DIFFICULT HAVE THESE PROBLEMS MADE IT FOR YOU TO DO YOUR WORK, TAKE CARE OF THINGS AT HOME, OR GET ALONG WITH OTHER PEOPLE: 1
2. FEELING DOWN, DEPRESSED OR HOPELESS: 1
SUM OF ALL RESPONSES TO PHQ9 QUESTIONS 1 & 2: 1
3. TROUBLE FALLING OR STAYING ASLEEP: 1
9. THOUGHTS THAT YOU WOULD BE BETTER OFF DEAD, OR OF HURTING YOURSELF: 0
7. TROUBLE CONCENTRATING ON THINGS, SUCH AS READING THE NEWSPAPER OR WATCHING TELEVISION: 1
1. LITTLE INTEREST OR PLEASURE IN DOING THINGS: 0
5. POOR APPETITE OR OVEREATING: 0

## 2023-08-29 NOTE — PROGRESS NOTES
Evi Martin (:  1985) is a Established patient, presenting virtually for evaluation of the following:    Assessment & Plan   Below is the assessment and plan developed based on review of pertinent history, physical exam, labs, studies, and medications. Do not want to increase clonazepam due to hx of substance use, try valium instead for longer acting tx  Needs psych asap    1. Polysubstance abuse ([address])  2. Panic disorder  -     diazePAM (VALIUM) 5 MG tablet; Take 1 tablet by mouth daily as needed for Anxiety for up to 30 days. Max Daily Amount: 5 mg, Disp-30 tablet, R-0Normal  3. Adjustment reaction with anxiety and depression  -     diazePAM (VALIUM) 5 MG tablet; Take 1 tablet by mouth daily as needed for Anxiety for up to 30 days. Max Daily Amount: 5 mg, Disp-30 tablet, R-0Normal    No follow-ups on file. Subjective   HPI  Review of Systems    Anxiety, depression, panic disorder  Hx of substance abuse  She is in an outpatient tx program and reports she is doing well  She has not est w psychiatry yet, but states her  is assisting in this  She states that the maintenance meds helps some but she continues to have panic attacks and asks for increase in clonazepam...        8/3 clonazepam #30     Objective   Patient-Reported Vitals  No data recorded     Physical Exam  [INSTRUCTIONS:  \"[x]\" Indicates a positive item  \"[]\" Indicates a negative item  -- DELETE ALL ITEMS NOT EXAMINED]    Constitutional: [x] Appears well-developed and well-nourished [x] No apparent distress      [] Abnormal -     Mental status: [x] Alert and awake  [x] Oriented to person/place/time [x] Able to follow commands    [] Abnormal -     Eyes:   EOM    [x]  Normal    [] Abnormal -   Sclera  [x]  Normal    [] Abnormal -          Discharge [x]  None visible   [] Abnormal -     HENT: [x] Normocephalic, atraumatic  [] Abnormal -   [x] Mouth/Throat: Mucous membranes are moist    External Ears [x] Normal  [] Abnormal

## 2023-08-29 NOTE — PROGRESS NOTES
Patient identified with two identification factors, Name and Date of Birth. Chief Complaint   Patient presents with    Medication Refill     LMP: 07/26/23      1. \"Have you been to the ER, urgent care clinic since your last visit? Hospitalized since your last visit? \" No    2. \"Have you seen or consulted any other health care providers outside of the [address] [place] [address] since your last visit? \" No

## 2023-08-30 RX ORDER — CLONAZEPAM 0.5 MG/1
TABLET ORAL
Qty: 30 TABLET | OUTPATIENT
Start: 2023-08-30

## 2023-09-26 RX ORDER — TRAZODONE HYDROCHLORIDE 50 MG/1
50 TABLET ORAL NIGHTLY PRN
Qty: 30 TABLET | Refills: 1 | Status: SHIPPED | OUTPATIENT
Start: 2023-09-26

## 2023-10-05 ENCOUNTER — TELEPHONE (OUTPATIENT)
Facility: CLINIC | Age: 38
End: 2023-10-05

## 2023-10-31 ENCOUNTER — TELEMEDICINE (OUTPATIENT)
Facility: CLINIC | Age: 38
End: 2023-10-31
Payer: COMMERCIAL

## 2023-10-31 ENCOUNTER — TELEPHONE (OUTPATIENT)
Facility: CLINIC | Age: 38
End: 2023-10-31

## 2023-10-31 DIAGNOSIS — F41.0 PANIC DISORDER: ICD-10-CM

## 2023-10-31 DIAGNOSIS — M54.16 LUMBAR RADICULOPATHY: ICD-10-CM

## 2023-10-31 DIAGNOSIS — F19.11 HISTORY OF SUBSTANCE ABUSE (HCC): Primary | ICD-10-CM

## 2023-10-31 DIAGNOSIS — F43.23 ADJUSTMENT REACTION WITH ANXIETY AND DEPRESSION: ICD-10-CM

## 2023-10-31 PROCEDURE — 99214 OFFICE O/P EST MOD 30 MIN: CPT | Performed by: NURSE PRACTITIONER

## 2023-10-31 RX ORDER — CLONAZEPAM 1 MG/1
TABLET ORAL
COMMUNITY
Start: 2023-10-25

## 2023-10-31 RX ORDER — GABAPENTIN 400 MG/1
CAPSULE ORAL
Qty: 60 CAPSULE | Refills: 2 | Status: SHIPPED | OUTPATIENT
Start: 2023-10-31 | End: 2023-10-31 | Stop reason: SDUPTHER

## 2023-10-31 RX ORDER — GABAPENTIN 400 MG/1
CAPSULE ORAL
Qty: 60 CAPSULE | Refills: 2 | Status: SHIPPED | OUTPATIENT
Start: 2023-10-31 | End: 2024-10-31

## 2023-10-31 NOTE — TELEPHONE ENCOUNTER
Pharmacy called needing clarification on rx below. Directions state (1) 400 mg 2x/day, do not exceed 600 mg.  Pharmacy needs to know whether dosage is incorrect or do not exceed is incorrect.       gabapentin (NEURONTIN) 400 MG capsule

## 2023-10-31 NOTE — PROGRESS NOTES
Pt is here for   Chief Complaint   Patient presents with    Medication Refill     Does increase of gabapentin 400mg     1. Have you been to the ER, urgent care clinic since your last visit? Hospitalized since your last visit? No    2. Have you seen or consulted any other health care providers outside of the [address] [place] [address] Avenue since your last visit? Include any pap smears or colon screening.  No

## 2023-10-31 NOTE — PROGRESS NOTES
Lydia Chase, was evaluated through a synchronous (real-time) audio-video encounter. The patient (or guardian if applicable) is aware that this is a billable service, which includes applicable co-pays. This Virtual Visit was conducted with patient's (and/or legal guardian's) consent. Patient identification was verified, and a caregiver was present when appropriate. The patient was located at Home: [address] [place] [id number]  Provider was located at [address] [place] [address] ([address] [place] [address]): [address] [place] [address] (:  1985) is a Established patient, presenting virtually for evaluation of the following:    Assessment & Plan   Below is the assessment and plan developed based on review of pertinent history, physical exam, labs, studies, and medications. [facility]  Crisis was on their way to hotel to speak w her  Discussed agreement in tapering off benzo thru psych    1. History of substance abuse ([address])  2. Panic disorder  3. Adjustment reaction with anxiety and depression  4. Lumbar radiculopathy  -     gabapentin (NEURONTIN) 400 MG capsule; TAKE 1 CAPSULE BY MOUTH TWICE DAILY . DO NOT EXCEED 600MG, Disp-60 capsule, R-2Normal    No follow-ups on file. Subjective   HPI  Review of Systems    Lumbar radiculopathy asking for gabapentin increase  Denies saddle numbness, leg weakness, falls or bowel/bladder dysfunction.     Hx of substance abuse; sober since 2023, also off [facility] psych Dr Vivian Cartwright for this and depression/anxiety, plan to taper off clonazepam    She is homeless, she is in a hotel paid for by crisis  She no longer has a           Objective   Patient-Reported Vitals  No data recorded     Physical Exam  [INSTRUCTIONS:  \"[x]\" Indicates a positive item  \"[]\" Indicates a negative item  -- DELETE ALL ITEMS NOT EXAMINED]    Constitutional: [x] Appears well-developed and well-nourished [x] No apparent distress      [] Abnormal -     Mental status: [x] Alert

## 2023-11-15 DIAGNOSIS — F43.23 ADJUSTMENT REACTION WITH ANXIETY AND DEPRESSION: ICD-10-CM

## 2023-11-17 ENCOUNTER — TELEPHONE (OUTPATIENT)
Facility: CLINIC | Age: 38
End: 2023-11-17

## 2023-11-17 RX ORDER — ZIPRASIDONE HYDROCHLORIDE 40 MG/1
40 CAPSULE ORAL 2 TIMES DAILY WITH MEALS
Qty: 60 CAPSULE | Refills: 2 | OUTPATIENT
Start: 2023-11-17

## 2023-11-24 ENCOUNTER — TELEMEDICINE (OUTPATIENT)
Facility: CLINIC | Age: 38
End: 2023-11-24

## 2023-11-24 DIAGNOSIS — M54.16 LUMBAR RADICULOPATHY: ICD-10-CM

## 2023-11-24 RX ORDER — GABAPENTIN 400 MG/1
CAPSULE ORAL
Qty: 60 CAPSULE | Refills: 2 | Status: SHIPPED | OUTPATIENT
Start: 2023-11-24 | End: 2024-11-24

## 2023-11-24 NOTE — PROGRESS NOTES
10/10 back pain     Pt is here for   Chief Complaint   Patient presents with    Urinary Pain     With frequency and odor     1. Have you been to the ER, urgent care clinic since your last visit? Hospitalized since your last visit? No    2. Have you seen or consulted any other health care providers outside of the [address] [place] [address] Avenue since your last visit? Include any pap smears or colon screening.  No

## 2023-11-24 NOTE — PROGRESS NOTES
Pt unable to connect  Scheduled for UTI, pt now telling nursing that her leg is hurting- chronic, not acute    Advised ER or next in person

## 2023-11-27 RX ORDER — TRAZODONE HYDROCHLORIDE 50 MG/1
TABLET ORAL
Qty: 30 TABLET | Refills: 0 | Status: SHIPPED | OUTPATIENT
Start: 2023-11-27

## 2023-11-27 NOTE — TELEPHONE ENCOUNTER
Last appointment: 11/24/2023 Virtual visit NP Liseth Kevin   Next appointment: 12/28/2023 MICHELLE Kevin   Previous refill encounter(s):   09/26/2023 Desyrel #30 with 1 refill.      For Pharmacy Admin Tracking Only    Program: Medication Refill  Intervention Detail: New Rx: 1, reason: Patient Preference  Time Spent (min): 5      Requested Prescriptions     Pending Prescriptions Disp Refills    traZODone (DESYREL) 50 MG tablet [Pharmacy Med Name: traZODone 50 MG TABLET] 30 tablet 0     Sig: TAKE ONE TABLET BY MOUTH NIGHTLY AS NEEDED FOR SLEEP

## 2023-12-12 ENCOUNTER — TELEMEDICINE (OUTPATIENT)
Facility: CLINIC | Age: 38
End: 2023-12-12
Payer: COMMERCIAL

## 2023-12-12 DIAGNOSIS — J01.00 ACUTE NON-RECURRENT MAXILLARY SINUSITIS: Primary | ICD-10-CM

## 2023-12-12 PROCEDURE — 99213 OFFICE O/P EST LOW 20 MIN: CPT | Performed by: NURSE PRACTITIONER

## 2023-12-12 RX ORDER — CEPHALEXIN 500 MG/1
500 CAPSULE ORAL 2 TIMES DAILY
Qty: 14 CAPSULE | Refills: 0 | Status: SHIPPED | OUTPATIENT
Start: 2023-12-12 | End: 2023-12-19

## 2023-12-12 ASSESSMENT — PATIENT HEALTH QUESTIONNAIRE - PHQ9
SUM OF ALL RESPONSES TO PHQ QUESTIONS 1-9: 0
2. FEELING DOWN, DEPRESSED OR HOPELESS: 0
SUM OF ALL RESPONSES TO PHQ QUESTIONS 1-9: 0
SUM OF ALL RESPONSES TO PHQ9 QUESTIONS 1 & 2: 0
SUM OF ALL RESPONSES TO PHQ QUESTIONS 1-9: 0
SUM OF ALL RESPONSES TO PHQ QUESTIONS 1-9: 0
1. LITTLE INTEREST OR PLEASURE IN DOING THINGS: 0

## 2023-12-12 NOTE — PROGRESS NOTES
Romina Madrid, was evaluated through a synchronous (real-time) audio-video encounter. The patient (or guardian if applicable) is aware that this is a billable service, which includes applicable co-pays. This Virtual Visit was conducted with patient's (and/or legal guardian's) consent. Patient identification was verified, and a caregiver was present when appropriate. The patient was located at Other: car  Provider was located at Home ([facility]): [address] [place] [address] (:  1985) is a Established patient, presenting virtually for evaluation of the following:    Assessment & Plan   Below is the assessment and plan developed based on review of pertinent history, physical exam, labs, studies, and medications. Recc covid test  Will rx abx for possible UTI and sinusitis however, INI needs urine sample  ER if acute worsening    1. Acute non-recurrent maxillary sinusitis  -     cephALEXin (KEFLEX) 500 MG capsule; Take 1 capsule by mouth 2 times daily for 7 days, Disp-14 capsule, R-0Normal    No follow-ups on file.        Subjective   HPI  Review of Systems    Sinusitis x3-4 days  Congestion, left ear ache  No fevers  Her daughters have covid- she hasn't seen them in a few days but has not taken a test     She also think she has a UTI, odor and flank pain        Objective   Patient-Reported Vitals  No data recorded     Physical Exam             --Lisbeth Michael, APRN - NP

## 2023-12-12 NOTE — PROGRESS NOTES
Chief Complaint   Patient presents with    Sinus Problem     1. Have you been to the ER, urgent care clinic since your last visit? Hospitalized since your last visit? No    2. Have you seen or consulted any other health care providers outside of the [address] [place] [address] since your last visit? Include any pap smears or colon screening.   No

## 2023-12-27 RX ORDER — TRAZODONE HYDROCHLORIDE 50 MG/1
50 TABLET ORAL NIGHTLY PRN
Qty: 30 TABLET | Refills: 0 | Status: SHIPPED | OUTPATIENT
Start: 2023-12-27

## 2024-01-26 ENCOUNTER — TELEMEDICINE (OUTPATIENT)
Facility: CLINIC | Age: 39
End: 2024-01-26

## 2024-01-26 DIAGNOSIS — K21.9 GASTROESOPHAGEAL REFLUX DISEASE WITHOUT ESOPHAGITIS: Primary | ICD-10-CM

## 2024-01-26 DIAGNOSIS — M54.16 LUMBAR RADICULOPATHY: ICD-10-CM

## 2024-01-26 RX ORDER — GABAPENTIN 400 MG/1
CAPSULE ORAL
Qty: 60 CAPSULE | Refills: 2 | Status: SHIPPED | OUTPATIENT
Start: 2024-01-26 | End: 2025-01-26

## 2024-01-26 RX ORDER — ASPIRIN 81 MG/1
81 TABLET ORAL DAILY
Qty: 90 TABLET | Refills: 0 | Status: SHIPPED | OUTPATIENT
Start: 2024-01-26 | End: 2024-02-18

## 2024-01-26 NOTE — PROGRESS NOTES
Luli Bartlett, was evaluated through a synchronous (real-time) audio-video encounter. The patient (or guardian if applicable) is aware that this is a billable service, which includes applicable co-pays. This Virtual Visit was conducted with patient's (and/or legal guardian's) consent. Patient identification was verified, and a caregiver was present when appropriate.   The patient was located at Home: [address] [place] [address].  [facility] [id number]  Provider was located at Home ([employer]): WILMER Bartlett (:  1985) is a Established patient, presenting virtually for evaluation of the following:    Assessment & Plan   Below is the assessment and plan developed based on review of pertinent history, physical exam, labs, studies, and medications.    Sounds like GERD, fu INI  ER if acute worsening    1. Gastroesophageal reflux disease without esophagitis  2. Lumbar radiculopathy  -     gabapentin (NEURONTIN) 400 MG capsule; TAKE 1 CAPSULE BY MOUTH TWICE DAILY ., Disp-60 capsule, R-2Normal    No follow-ups on file.       Subjective   HPI  Review of Systems    Chest pain yesterday:epigastric burning, no radiation/pressure/sob  No s/s today        Med refill         Objective   Patient-Reported Vitals  No data recorded     Physical Exam             --IHSAN Lemons - MICHELLE

## 2024-01-26 NOTE — TELEPHONE ENCOUNTER
Pt is requesting a refill on the Aspirin and Neurontin.     Pt shows an allergy(side effects) to Nsaids. Nothing specified.     The Neurontin 400 mg #60 with 2 refills was sent to [facility] #[id number] on 01/26/2024.     Last appointment: 01/26/2024 Virtual visit MICHELLE Persaud   Next appointment: 03/24/2024 MICHELLE Persaud     For Pharmacy Admin Tracking Only    Program: Medication Refill  Intervention Detail: Discontinued Rx: 1, reason: Duplicate Therapy and New Rx: 1, reason: Patient Preference  Time Spent (min): 5      Requested Prescriptions     Pending Prescriptions Disp Refills    aspirin 81 MG EC tablet 30 tablet 0     Sig: Take 1 tablet by mouth daily     ----- Message from Arabella Sosa sent at 1/26/2024 10:53 AM EST -----  Subject: Refill Request    QUESTIONS  Name of Medication? gabapentin (NEURONTIN) 400 MG capsule  Patient-reported dosage and instructions? 400mg, 2x daily  How many days do you have left? 2  Preferred Pharmacy? [facility] [id number]  Pharmacy phone number (if available)? [phone]  ---------------------------------------------------------------------------  --------------,  Name of Medication? Other - Aspirin   Patient-reported dosage and instructions? 10mg, 1x daily  How many days do you have left? 0  Preferred Pharmacy? [facility] [id number]  Pharmacy phone number (if available)? [phone]  ---------------------------------------------------------------------------  --------------  CALL BACK INFO  What is the best way for the office to contact you? OK to leave message on   voicemail  Preferred Call Back Phone Number? [phone]  ---------------------------------------------------------------------------  --------------  SCRIPT ANSWERS  Relationship to Patient? Self

## 2024-02-14 ENCOUNTER — OFFICE VISIT (OUTPATIENT)
Facility: CLINIC | Age: 39
End: 2024-02-14

## 2024-02-14 VITALS
RESPIRATION RATE: 18 BRPM | SYSTOLIC BLOOD PRESSURE: 113 MMHG | DIASTOLIC BLOOD PRESSURE: 70 MMHG | TEMPERATURE: 98.2 F | WEIGHT: 235.9 LBS | OXYGEN SATURATION: 97 % | HEIGHT: 62 IN | BODY MASS INDEX: 43.41 KG/M2 | HEART RATE: 71 BPM

## 2024-02-14 DIAGNOSIS — N76.0 ACUTE VAGINITIS: Primary | ICD-10-CM

## 2024-02-14 DIAGNOSIS — M54.16 LUMBAR RADICULOPATHY: ICD-10-CM

## 2024-02-14 DIAGNOSIS — Z11.3 SCREENING EXAMINATION FOR STD (SEXUALLY TRANSMITTED DISEASE): ICD-10-CM

## 2024-02-14 PROBLEM — F19.10 POLYSUBSTANCE ABUSE (HCC): Status: RESOLVED | Noted: 2023-07-24 | Resolved: 2024-02-14

## 2024-02-14 PROBLEM — F19.11 HISTORY OF SUBSTANCE ABUSE (HCC): Status: ACTIVE | Noted: 2024-02-14

## 2024-02-14 LAB
BILIRUBIN, URINE, POC: NEGATIVE
BLOOD URINE, POC: NEGATIVE
GLUCOSE URINE, POC: NEGATIVE
KETONES, URINE, POC: NEGATIVE
LEUKOCYTE ESTERASE, URINE, POC: NORMAL
NITRITE, URINE, POC: NEGATIVE
PH, URINE, POC: 7 (ref 4.6–8)
PROTEIN,URINE, POC: NEGATIVE
SPECIFIC GRAVITY, URINE, POC: 1.02 (ref 1–1.03)
URINALYSIS CLARITY, POC: CLEAR
URINALYSIS COLOR, POC: YELLOW
UROBILINOGEN, POC: NORMAL

## 2024-02-14 RX ORDER — GABAPENTIN 400 MG/1
CAPSULE ORAL
Qty: 60 CAPSULE | Refills: 2 | Status: SHIPPED | OUTPATIENT
Start: 2024-02-14 | End: 2025-02-14

## 2024-02-14 NOTE — PROGRESS NOTES
Subjective: (As above and below)     Chief Complaint   Patient presents with    Urinary Tract Infection    Exposure to STD    Leg Swelling     Pt states she has been swelling in her ankles and leg, this started about a month ago.     Luli Bartlett is a 38 y.o. year old female who presents for     Vaginal odor and scant discharge  No dysuria or other UTI s/s          BP Readings from Last 3 Encounters:   07/24/23 112/70   07/02/23 130/78   01/30/23 103/61       Wt Readings from Last 3 Encounters:   02/14/24 107 kg (235 lb 14.4 oz)   07/24/23 115.2 kg (254 lb)   07/01/23 113.4 kg (250 lb)     Leg swelling: bilat anles; improves some w elevation  Dg cp, sob    ECHO 5/2022:    Left Ventricle: Normal left ventricular systolic function with a visually estimated EF of 55 - 60%. Left ventricle size is normal. Normal wall thickness. Normal wall motion. Normal diastolic function.    Right Ventricle: Right ventricle is moderately dilated. Moderately reduced systolic function.    Tricuspid Valve: Normal RVSP.      Reviewed PmHx, RxHx, FmHx, SocHx, AllgHx and updated in chart.  No family history on file.    Past Medical History:   Diagnosis Date    Acute pulmonary embolism (HCC) 05/09/2022    Iron deficiency anemia 08/22/2022    Opioid use disorder, mild, abuse (HCC) 04/24/2023    Polysubstance abuse ([facility]) 07/24/2023 7.2023: per pt clean since 4/2023, getting ready to start outpatient program    Psychiatric disorder     anxiety    Psychiatric disorder     depression    Psychosis ([facility]) 04/21/2023      Social History     Socioeconomic History    Marital status:    Tobacco Use    Smoking status: Former    Smokeless tobacco: Never   Vaping Use    Vaping Use: Never used   Substance and Sexual Activity    Alcohol use: Not Currently    Drug use: Not Currently    Sexual activity: Not Currently     Social Determinants of Health     Financial Resource Strain: Low Risk  (8/29/2023)    Overall Financial Resource Strain

## 2024-02-14 NOTE — PROGRESS NOTES
Chief Complaint   Patient presents with    Urinary Tract Infection    Exposure to STD    Leg Swelling     Pt states she has been swelling in her ankles and leg, this started about a month ago.     1. \"Have you been to the ER, urgent care clinic since your last visit?  Hospitalized since your last visit?\" No    2. \"Have you seen or consulted any other health care providers outside of the [facility] since your last visit?\"  No    3. For patients aged 45-75: Has the patient had a colonoscopy / FIT/ Cologuard? N/A      If the patient is female:    4. For patients aged 40-74: Has the patient had a mammogram within the past 2 years? No need a referral      5. For patients aged 21-65: Has the patient had a pap smear? Yes    HIPPA confirmed by two patient identifiers.

## 2024-02-15 DIAGNOSIS — N76.0 ACUTE VAGINITIS: ICD-10-CM

## 2024-02-18 LAB
A VAGINAE DNA VAG QL NAA+PROBE: ABNORMAL SCORE
BVAB2 DNA VAG QL NAA+PROBE: ABNORMAL SCORE
C ALBICANS DNA VAG QL NAA+PROBE: NEGATIVE
C GLABRATA DNA VAG QL NAA+PROBE: NEGATIVE
C TRACH RRNA SPEC QL NAA+PROBE: NEGATIVE
CANDIDA KRUSEI: NEGATIVE
CANDIDA LUSITANIAE, NAA: NEGATIVE
CANDIDA PARAPSILOSIS/TROPICALIS: NEGATIVE
MEGA1 DNA VAG QL NAA+PROBE: ABNORMAL SCORE
N GONORRHOEA RRNA SPEC QL NAA+PROBE: NEGATIVE
T VAGINALIS RRNA SPEC QL NAA+PROBE: NEGATIVE

## 2024-02-18 RX ORDER — METRONIDAZOLE 7.5 MG/G
GEL TOPICAL NIGHTLY
Qty: 5 EACH | Refills: 0 | Status: SHIPPED | OUTPATIENT
Start: 2024-02-18 | End: 2024-02-23

## 2024-02-20 ENCOUNTER — TELEPHONE (OUTPATIENT)
Facility: CLINIC | Age: 39
End: 2024-02-20

## 2024-02-20 RX ORDER — METRONIDAZOLE 7.5 MG/G
1 GEL VAGINAL NIGHTLY
Qty: 5 EACH | Refills: 0 | Status: SHIPPED | OUTPATIENT
Start: 2024-02-20 | End: 2024-02-22 | Stop reason: SDUPTHER

## 2024-02-20 NOTE — TELEPHONE ENCOUNTER
----- Message from Nona Eaton sent at 2/20/2024  8:43 AM EST -----  Subject: Message to Provider    QUESTIONS  Information for Provider? Pt of Erica Bartlett was advised by pharmacy   that directions on RX received by PCP are unclear. [facility] stated they   received 2 different directions for METROGEL RX and are needing   clarification. [facility] can be reached at [phone]. Please call patient   when this has been corrected.   ---------------------------------------------------------------------------  --------------  CALL BACK INFO  [phone]; OK to leave message on voicemail  ---------------------------------------------------------------------------  --------------  SCRIPT ANSWERS  Relationship to Patient? Self

## 2024-02-22 ENCOUNTER — TELEPHONE (OUTPATIENT)
Facility: CLINIC | Age: 39
End: 2024-02-22

## 2024-02-22 RX ORDER — METRONIDAZOLE 7.5 MG/G
1 GEL VAGINAL NIGHTLY
Qty: 5 EACH | Refills: 0 | Status: SHIPPED | OUTPATIENT
Start: 2024-02-22 | End: 2024-02-22 | Stop reason: SDUPTHER

## 2024-02-22 RX ORDER — METRONIDAZOLE 7.5 MG/G
1 GEL VAGINAL NIGHTLY
Qty: 5 EACH | Refills: 0 | Status: SHIPPED | OUTPATIENT
Start: 2024-02-22 | End: 2024-02-27

## 2024-02-22 NOTE — TELEPHONE ENCOUNTER
Pharmacy needs clarification on instructions for rx below. 2 different instructions were sent      metroNIDAZOLE (METROGEL) 0.75 % gel

## 2024-02-23 ENCOUNTER — TELEPHONE (OUTPATIENT)
Facility: CLINIC | Age: 39
End: 2024-02-23

## 2024-02-23 NOTE — TELEPHONE ENCOUNTER
Patient states compression socks were discussed at last visit and she would like to know how she is supposed to receive them?  Patient asks for callback from nurse or provider

## 2024-03-12 ENCOUNTER — TELEPHONE (OUTPATIENT)
Facility: CLINIC | Age: 39
End: 2024-03-12
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
## 2024-03-15 DIAGNOSIS — M54.16 LUMBAR RADICULOPATHY: ICD-10-CM

## 2024-03-15 RX ORDER — GABAPENTIN 400 MG/1
CAPSULE ORAL
Qty: 60 CAPSULE | Refills: 2 | Status: SHIPPED | OUTPATIENT
Start: 2024-03-15 | End: 2025-03-16
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
## 2024-03-26 DIAGNOSIS — M54.16 LUMBAR RADICULOPATHY: ICD-10-CM

## 2024-03-26 RX ORDER — GABAPENTIN 400 MG/1
CAPSULE ORAL
Qty: 60 CAPSULE | Refills: 2 | OUTPATIENT
Start: 2024-03-26 | End: 2025-03-27

## 2024-04-26 RX ORDER — ASPIRIN 81 MG/1
81 TABLET ORAL DAILY
Qty: 90 TABLET | Refills: 0 | Status: SHIPPED | OUTPATIENT
Start: 2024-04-26

## 2024-05-14 ENCOUNTER — OFFICE VISIT (OUTPATIENT)
Facility: CLINIC | Age: 39
End: 2024-05-14
Payer: COMMERCIAL

## 2024-05-14 VITALS
TEMPERATURE: 98.7 F | DIASTOLIC BLOOD PRESSURE: 64 MMHG | OXYGEN SATURATION: 98 % | RESPIRATION RATE: 18 BRPM | HEIGHT: 62 IN | SYSTOLIC BLOOD PRESSURE: 102 MMHG | WEIGHT: 212 LBS | BODY MASS INDEX: 39.01 KG/M2 | HEART RATE: 74 BPM

## 2024-05-14 DIAGNOSIS — M54.16 LUMBAR RADICULOPATHY: Primary | ICD-10-CM

## 2024-05-14 DIAGNOSIS — N76.0 ACUTE VAGINITIS: ICD-10-CM

## 2024-05-14 PROCEDURE — 99213 OFFICE O/P EST LOW 20 MIN: CPT | Performed by: NURSE PRACTITIONER

## 2024-05-14 RX ORDER — SERTRALINE HYDROCHLORIDE 100 MG/1
100 TABLET, FILM COATED ORAL DAILY
COMMUNITY
Start: 2024-05-07

## 2024-05-14 RX ORDER — GABAPENTIN 400 MG/1
CAPSULE ORAL
Qty: 60 CAPSULE | Refills: 2 | Status: CANCELLED | OUTPATIENT
Start: 2024-05-14 | End: 2025-05-15

## 2024-05-14 RX ORDER — GABAPENTIN 400 MG/1
400 CAPSULE ORAL 3 TIMES DAILY
Qty: 60 CAPSULE | Refills: 2 | Status: SHIPPED | OUTPATIENT
Start: 2024-05-14 | End: 2025-05-14

## 2024-05-14 RX ORDER — BUPRENORPHINE AND NALOXONE 8; 2 MG/1; MG/1
1 FILM, SOLUBLE BUCCAL; SUBLINGUAL DAILY
COMMUNITY
Start: 2024-03-17

## 2024-05-14 RX ORDER — ZIPRASIDONE HYDROCHLORIDE 40 MG/1
40 CAPSULE ORAL 2 TIMES DAILY WITH MEALS
COMMUNITY
Start: 2024-04-27

## 2024-05-14 NOTE — PROGRESS NOTES
Chief Complaint   Patient presents with    Other     Pt states she wants to make sure she is ok vaginally, pt states she has a clear color odorless discharge     \"Have you been to the ER, urgent care clinic since your last visit?  Hospitalized since your last visit?\"    NO    “Have you seen or consulted any other health care providers outside of [facility] since your last visit?”    NO            Click Here for Release of Records Request  /64 (Site: Left Upper Arm, Position: Sitting, Cuff Size: Large Adult)   Pulse 74   Temp 98.7 °F (37.1 °C)   Resp 18   Ht 1.575 m (5' 2\")   Wt 96.2 kg (212 lb)   LMP 04/26/2024 (Exact Date)   SpO2 98%   BMI 38.78 kg/m²

## 2024-05-14 NOTE — PROGRESS NOTES
Subjective: (As above and below)     Chief Complaint   Patient presents with    Other     Pt states she wants to make sure she is ok vaginally, pt states she has a clear color odorless discharge     Luli Bartlett is a 38 y.o. year old female who presents for     Clear vaginal discharge, no odor, irritation  Pap utd  No UTI s/s    Also asking for increase frequency of gabapentin due to lumbar radiculopathy  Denies saddle numbness, leg weakness, falls or bowel/bladder dysfunction.      Reviewed PmHx, RxHx, FmHx, SocHx, AllgHx and updated in chart.  No family history on file.    Past Medical History:   Diagnosis Date    Acute pulmonary embolism ([facility]) 05/09/2022    Iron deficiency anemia 08/22/2022    Opioid use disorder, mild, abuse ([facility]) 04/24/2023    Polysubstance abuse ([facility]) 07/24/2023 7.2023: per pt clean since 4/2023, getting ready to start outpatient program    Psychiatric disorder     anxiety    Psychiatric disorder     depression    Psychosis ([facility]) 04/21/2023      Social History     Socioeconomic History    Marital status:    Tobacco Use    Smoking status: Former    Smokeless tobacco: Never   Vaping Use    Vaping Use: Never used   Substance and Sexual Activity    Alcohol use: Not Currently    Drug use: Not Currently    Sexual activity: Not Currently     Social Determinants of Health     Financial Resource Strain: Low Risk  (8/29/2023)    Overall Financial Resource Strain (CARDIA)     Difficulty of Paying Living Expenses: Not hard at all   Transportation Needs: Unknown (8/29/2023)    PRAPARE - Transportation     Lack of Transportation (Non-Medical): No   Housing Stability: Unknown (8/29/2023)    Housing Stability Vital Sign     Unstable Housing in the Last Year: No          Current Outpatient Medications   Medication Sig    buprenorphine-naloxone (SUBOXONE) 8-2 MG FILM SL film Place 1 Film under the tongue daily.    sertraline (ZOLOFT) 100 MG tablet Take 1 tablet by mouth daily    ziprasidone (GEODON)

## 2024-05-19 LAB
A VAGINAE DNA VAG QL NAA+PROBE: NORMAL SCORE
BVAB2 DNA VAG QL NAA+PROBE: NORMAL SCORE
C ALBICANS DNA VAG QL NAA+PROBE: NEGATIVE
C GLABRATA DNA VAG QL NAA+PROBE: NEGATIVE
C TRACH RRNA SPEC QL NAA+PROBE: NEGATIVE
CANDIDA KRUSEI: NEGATIVE
CANDIDA LUSITANIAE, NAA: NEGATIVE
CANDIDA PARAPSILOSIS/TROPICALIS: NEGATIVE
MEGA1 DNA VAG QL NAA+PROBE: NORMAL SCORE
N GONORRHOEA RRNA SPEC QL NAA+PROBE: NEGATIVE
T VAGINALIS RRNA SPEC QL NAA+PROBE: NEGATIVE

## 2024-06-10 ENCOUNTER — TELEPHONE (OUTPATIENT)
Facility: CLINIC | Age: 39
End: 2024-06-10

## 2024-06-10 DIAGNOSIS — M54.16 LUMBAR RADICULOPATHY: ICD-10-CM

## 2024-06-10 RX ORDER — GABAPENTIN 400 MG/1
400 CAPSULE ORAL 3 TIMES DAILY
Qty: 60 CAPSULE | Refills: 2 | Status: SHIPPED | OUTPATIENT
Start: 2024-06-10 | End: 2025-06-10

## 2024-06-10 NOTE — TELEPHONE ENCOUNTER
Patient switching pharmacy to [facility]      gabapentin (NEURONTIN) 400 MG capsule

## 2024-07-29 ENCOUNTER — TELEPHONE (OUTPATIENT)
Facility: CLINIC | Age: 39
End: 2024-07-29

## 2024-07-29 DIAGNOSIS — M54.16 LUMBAR RADICULOPATHY: ICD-10-CM

## 2024-07-29 RX ORDER — GABAPENTIN 400 MG/1
400 CAPSULE ORAL 3 TIMES DAILY
Qty: 60 CAPSULE | Refills: 2 | Status: CANCELLED | OUTPATIENT
Start: 2024-07-29 | End: 2025-07-29

## 2024-07-30 DIAGNOSIS — M54.16 LUMBAR RADICULOPATHY: ICD-10-CM

## 2024-07-30 RX ORDER — GABAPENTIN 400 MG/1
400 CAPSULE ORAL 3 TIMES DAILY
Qty: 270 CAPSULE | Refills: 0 | Status: SHIPPED | OUTPATIENT
Start: 2024-07-30 | End: 2025-07-30

## 2024-08-07 ENCOUNTER — HOSPITAL ENCOUNTER (EMERGENCY)
Facility: HOSPITAL | Age: 39
Discharge: ANOTHER HOSPITAL | End: 2024-08-07
Attending: STUDENT IN AN ORGANIZED HEALTH CARE EDUCATION/TRAINING PROGRAM
Payer: COMMERCIAL

## 2024-08-07 ENCOUNTER — HOSPITAL ENCOUNTER (INPATIENT)
Facility: HOSPITAL | Age: 39
LOS: 7 days | Discharge: HOME | DRG: 755 | End: 2024-08-14
Attending: PSYCHIATRY & NEUROLOGY | Admitting: PSYCHIATRY & NEUROLOGY
Payer: COMMERCIAL

## 2024-08-07 VITALS
WEIGHT: 228.4 LBS | BODY MASS INDEX: 42.03 KG/M2 | SYSTOLIC BLOOD PRESSURE: 122 MMHG | TEMPERATURE: 98.5 F | OXYGEN SATURATION: 98 % | RESPIRATION RATE: 18 BRPM | DIASTOLIC BLOOD PRESSURE: 75 MMHG | HEART RATE: 65 BPM | HEIGHT: 62 IN

## 2024-08-07 DIAGNOSIS — R45.851 SUICIDAL IDEATION: Primary | ICD-10-CM

## 2024-08-07 DIAGNOSIS — F43.23 ADJUSTMENT DISORDER WITH MIXED ANXIETY AND DEPRESSED MOOD: Primary | ICD-10-CM

## 2024-08-07 PROBLEM — F39 UNSPECIFIED MOOD (AFFECTIVE) DISORDER (HCC): Status: ACTIVE | Noted: 2024-08-07

## 2024-08-07 LAB
ALBUMIN SERPL-MCNC: 4.2 G/DL (ref 3.5–5)
ALBUMIN/GLOB SERPL: 1.2 (ref 1.1–2.2)
ALP SERPL-CCNC: 70 U/L (ref 45–117)
ALT SERPL-CCNC: 24 U/L (ref 12–78)
AMPHET UR QL SCN: NEGATIVE
ANION GAP SERPL CALC-SCNC: 5 MMOL/L (ref 5–15)
APAP SERPL-MCNC: <2 UG/ML (ref 10–30)
APPEARANCE UR: CLEAR
AST SERPL-CCNC: 16 U/L (ref 15–37)
BACTERIA URNS QL MICRO: ABNORMAL /HPF
BARBITURATES UR QL SCN: NEGATIVE
BASOPHILS # BLD: 0 K/UL (ref 0–0.1)
BASOPHILS NFR BLD: 1 % (ref 0–1)
BENZODIAZ UR QL: POSITIVE
BILIRUB SERPL-MCNC: 0.3 MG/DL (ref 0.2–1)
BILIRUB UR QL: NEGATIVE
BUN SERPL-MCNC: 7 MG/DL (ref 6–20)
BUN/CREAT SERPL: 7 (ref 12–20)
CALCIUM SERPL-MCNC: 9 MG/DL (ref 8.5–10.1)
CANNABINOIDS UR QL SCN: NEGATIVE
CHLORIDE SERPL-SCNC: 105 MMOL/L (ref 97–108)
CO2 SERPL-SCNC: 28 MMOL/L (ref 21–32)
COCAINE UR QL SCN: POSITIVE
COLOR UR: ABNORMAL
CREAT SERPL-MCNC: 0.98 MG/DL (ref 0.55–1.02)
DIFFERENTIAL METHOD BLD: NORMAL
EKG ATRIAL RATE: 71 BPM
EKG DIAGNOSIS: NORMAL
EKG P AXIS: 19 DEGREES
EKG P-R INTERVAL: 174 MS
EKG Q-T INTERVAL: 444 MS
EKG QRS DURATION: 96 MS
EKG QTC CALCULATION (BAZETT): 482 MS
EKG R AXIS: -13 DEGREES
EKG T AXIS: 2 DEGREES
EKG VENTRICULAR RATE: 71 BPM
EOSINOPHIL # BLD: 0.2 K/UL (ref 0–0.4)
EOSINOPHIL NFR BLD: 3 % (ref 0–7)
EPITH CASTS URNS QL MICRO: ABNORMAL /LPF
ERYTHROCYTE [DISTWIDTH] IN BLOOD BY AUTOMATED COUNT: 13.9 % (ref 11.5–14.5)
ETHANOL SERPL-MCNC: <10 MG/DL (ref 0–0.08)
GLOBULIN SER CALC-MCNC: 3.5 G/DL (ref 2–4)
GLUCOSE UR STRIP.AUTO-MCNC: NEGATIVE MG/DL
HCG UR QL: NEGATIVE
HCT VFR BLD AUTO: 37.5 % (ref 35–47)
HGB BLD-MCNC: 12.5 G/DL (ref 11.5–16)
HGB UR QL STRIP: NEGATIVE
IMM GRANULOCYTES # BLD AUTO: 0 K/UL (ref 0–0.04)
IMM GRANULOCYTES NFR BLD AUTO: 0 % (ref 0–0.5)
KETONES UR QL STRIP.AUTO: NEGATIVE MG/DL
LEUKOCYTE ESTERASE UR QL STRIP.AUTO: NEGATIVE
LYMPHOCYTES # BLD: 1.4 K/UL (ref 0.8–3.5)
LYMPHOCYTES NFR BLD: 27 % (ref 12–49)
Lab: ABNORMAL
MCH RBC QN AUTO: 30.3 PG (ref 26–34)
MCHC RBC AUTO-ENTMCNC: 33.3 G/DL (ref 30–36.5)
MCV RBC AUTO: 91 FL (ref 80–99)
METHADONE UR QL: NEGATIVE
MONOCYTES # BLD: 0.2 K/UL (ref 0–1)
MONOCYTES NFR BLD: 5 % (ref 5–13)
NEUTS SEG # BLD: 3.5 K/UL (ref 1.8–8)
NEUTS SEG NFR BLD: 64 % (ref 32–75)
NITRITE UR QL STRIP.AUTO: NEGATIVE
NRBC # BLD: 0 K/UL (ref 0–0.01)
NRBC BLD-RTO: 0 PER 100 WBC
OPIATES UR QL: POSITIVE
PCP UR QL: NEGATIVE
PH UR STRIP: 8.5 (ref 5–8)
PLATELET # BLD AUTO: 262 K/UL (ref 150–400)
PMV BLD AUTO: 10.1 FL (ref 8.9–12.9)
POTASSIUM SERPL-SCNC: 3.6 MMOL/L (ref 3.5–5.1)
PROT SERPL-MCNC: 7.7 G/DL (ref 6.4–8.2)
PROT UR STRIP-MCNC: NEGATIVE MG/DL
RBC # BLD AUTO: 4.12 M/UL (ref 3.8–5.2)
RBC #/AREA URNS HPF: ABNORMAL /HPF (ref 0–5)
SALICYLATES SERPL-MCNC: <1.7 MG/DL (ref 2.8–20)
SODIUM SERPL-SCNC: 138 MMOL/L (ref 136–145)
SP GR UR REFRACTOMETRY: 1.01 (ref 1–1.03)
SPECIMEN HOLD: NORMAL
UROBILINOGEN UR QL STRIP.AUTO: 0.2 EU/DL (ref 0.2–1)
WBC # BLD AUTO: 5.4 K/UL (ref 3.6–11)
WBC URNS QL MICRO: ABNORMAL /HPF (ref 0–4)

## 2024-08-07 PROCEDURE — 81001 URINALYSIS AUTO W/SCOPE: CPT

## 2024-08-07 PROCEDURE — 90791 PSYCH DIAGNOSTIC EVALUATION: CPT

## 2024-08-07 PROCEDURE — 80143 DRUG ASSAY ACETAMINOPHEN: CPT

## 2024-08-07 PROCEDURE — 36415 COLL VENOUS BLD VENIPUNCTURE: CPT

## 2024-08-07 PROCEDURE — 81025 URINE PREGNANCY TEST: CPT

## 2024-08-07 PROCEDURE — 1240000000 HC EMOTIONAL WELLNESS R&B

## 2024-08-07 PROCEDURE — 85025 COMPLETE CBC W/AUTO DIFF WBC: CPT

## 2024-08-07 PROCEDURE — 80307 DRUG TEST PRSMV CHEM ANLYZR: CPT

## 2024-08-07 PROCEDURE — 99285 EMERGENCY DEPT VISIT HI MDM: CPT

## 2024-08-07 PROCEDURE — 80179 DRUG ASSAY SALICYLATE: CPT

## 2024-08-07 PROCEDURE — 82077 ASSAY SPEC XCP UR&BREATH IA: CPT

## 2024-08-07 PROCEDURE — 93005 ELECTROCARDIOGRAM TRACING: CPT

## 2024-08-07 PROCEDURE — 80053 COMPREHEN METABOLIC PANEL: CPT

## 2024-08-07 RX ORDER — TRAZODONE HYDROCHLORIDE 50 MG/1
50 TABLET ORAL NIGHTLY PRN
Status: DISCONTINUED | OUTPATIENT
Start: 2024-08-07 | End: 2024-08-14 | Stop reason: HOSPADM

## 2024-08-07 RX ORDER — ACETAMINOPHEN 325 MG/1
650 TABLET ORAL EVERY 4 HOURS PRN
Status: DISCONTINUED | OUTPATIENT
Start: 2024-08-07 | End: 2024-08-14 | Stop reason: HOSPADM

## 2024-08-07 RX ORDER — DIPHENHYDRAMINE HYDROCHLORIDE 50 MG/ML
50 INJECTION INTRAMUSCULAR; INTRAVENOUS EVERY 4 HOURS PRN
Status: DISCONTINUED | OUTPATIENT
Start: 2024-08-07 | End: 2024-08-14 | Stop reason: HOSPADM

## 2024-08-07 RX ORDER — HALOPERIDOL 5 MG/ML
5 INJECTION INTRAMUSCULAR EVERY 4 HOURS PRN
Status: DISCONTINUED | OUTPATIENT
Start: 2024-08-07 | End: 2024-08-14 | Stop reason: HOSPADM

## 2024-08-07 RX ORDER — HYDROXYZINE HYDROCHLORIDE 25 MG/1
50 TABLET, FILM COATED ORAL 3 TIMES DAILY PRN
Status: DISCONTINUED | OUTPATIENT
Start: 2024-08-07 | End: 2024-08-14 | Stop reason: HOSPADM

## 2024-08-07 RX ORDER — HALOPERIDOL 5 MG/1
5 TABLET ORAL EVERY 4 HOURS PRN
Status: DISCONTINUED | OUTPATIENT
Start: 2024-08-07 | End: 2024-08-14 | Stop reason: HOSPADM

## 2024-08-07 RX ORDER — MAGNESIUM HYDROXIDE/ALUMINUM HYDROXICE/SIMETHICONE 120; 1200; 1200 MG/30ML; MG/30ML; MG/30ML
30 SUSPENSION ORAL EVERY 6 HOURS PRN
Status: DISCONTINUED | OUTPATIENT
Start: 2024-08-07 | End: 2024-08-14 | Stop reason: HOSPADM

## 2024-08-07 RX ORDER — POLYETHYLENE GLYCOL 3350 17 G/17G
17 POWDER, FOR SOLUTION ORAL DAILY PRN
Status: DISCONTINUED | OUTPATIENT
Start: 2024-08-07 | End: 2024-08-14 | Stop reason: HOSPADM

## 2024-08-07 RX ORDER — LABETALOL HYDROCHLORIDE 5 MG/ML
INJECTION, SOLUTION INTRAVENOUS
Status: DISCONTINUED
Start: 2024-08-07 | End: 2024-08-07 | Stop reason: HOSPADM

## 2024-08-07 ASSESSMENT — LIFESTYLE VARIABLES
HOW MANY STANDARD DRINKS CONTAINING ALCOHOL DO YOU HAVE ON A TYPICAL DAY: 1 OR 2
HOW OFTEN DO YOU HAVE A DRINK CONTAINING ALCOHOL: MONTHLY OR LESS

## 2024-08-07 ASSESSMENT — PAIN - FUNCTIONAL ASSESSMENT: PAIN_FUNCTIONAL_ASSESSMENT: NONE - DENIES PAIN

## 2024-08-07 NOTE — ED TRIAGE NOTES
Patient is coming in with suicidal thoughts \" I am at the point that I am going to shoot myself\" Patient states has knife in possessions, explained that belongings would be removed and locked up for safety. Charge nurse notified of knife in possession and security will be called to [facility]. Patient states I want to be admitted.

## 2024-08-07 NOTE — PROGRESS NOTES
Patient changed into green gown. Belongings including shoes, clothes, medication, ID/credit cards, and book bag locked on patient shelf 7 & 8. Security called to [facility] belongings.

## 2024-08-07 NOTE — BSMART NOTE
Comprehensive Assessment Form Part 1      Section I - Disposition    Primary Diagnosis: Unspecified Mood Disorder (per pt, Bipolar Disorder)  Secondary Diagnosis: MASSIEL & PTSD (per hx); Poly Substance Abuse (per hx)    No current facility-administered medications on file prior to encounter.     Current Outpatient Medications on File Prior to Encounter   Medication Sig Dispense Refill    gabapentin (NEURONTIN) 400 MG capsule Take 1 capsule by mouth 3 times daily. TAKE 1 CAPSULE BY MOUTH TWICE DAILY . Max Daily Amount: 1,200 mg 270 capsule 0    buprenorphine-naloxone (SUBOXONE) 8-2 MG FILM SL film Place 1 Film under the tongue daily.      sertraline (ZOLOFT) 100 MG tablet Take 1 tablet by mouth daily      ziprasidone (GEODON) 40 MG capsule Take 1 capsule by mouth 2 times daily (with meals)      aspirin 81 MG EC tablet TAKE 1 TABLET BY MOUTH DAILY 90 tablet 0    Compression Stockings MISC by Does not apply route 1 each 0         The Medical Doctor to Psychiatrist conference was notcompleted.  The Medical Doctor is in agreement with Psychiatrist disposition because of (reason) pt meeting voluntary psych admission criteria.  The plan is voluntary [facility] admission.  The on-call Psychiatrist consulted was Dr. KELLY.  The admitting Psychiatrist will be Dr. RODRIGUEZ.  The admitting Diagnosis is Unspecified Mood Disorder.  The Payor source is [employer].    Based on the [place] Suicide Severity Risk Level  Scale there is HIGH risk for suicide.   Based on this assessment the overall risk of suicide is HIGH. The plan will be voluntary psych admission.     Section II - Integrated Summary  Summary:  Per triage, \"  Patient is coming in with suicidal thoughts \" I am at the point that I am going to shoot myself\" Patient states has knife in possessions, explained that belongings would be removed and locked up for safety. Charge nurse notified of knife in possession and [facility]. Patient states I want to be

## 2024-08-07 NOTE — BSMART NOTE
BSMART assessment completed, and suicide risk level noted to be HIGH. Primary Nurse Sonal and Charge Nurse ROBIN and Physician MD Za notified. Concerns not observed.

## 2024-08-07 NOTE — ED PROVIDER NOTES
3:21 PM   Luli Bartlett is a 38 y.o. female awaiting placement in a psychiatric facility with a diagnosis of   1. Suicidal ideation    . The patient was reexamined and remains clinically stable. All needs are being met at this time. All questions from the patient and/ or family were answered. The patient will continue to be reassessed intermittently until transfer.     Patient Vitals for the past 24 hrs:   BP Temp Temp src Pulse Resp SpO2 Height Weight   08/07/24 1411 115/88 98.3 °F (36.8 °C) Oral 60 20 99 % -- --   08/07/24 0829 133/84 98.1 °F (36.7 °C) Oral 74 20 98 % 1.575 m (5' 2\") 103.6 kg (228 lb 6.3 oz)       MD Reji Castro Imani A, MD  08/07/24 9260    
WBC, UA: 0-4 [AL]      ED Course User Index  [AL] Walter Mendenhall MD  [TR] Lorie Mays PA-C           CONSULTS:  IP CONSULT TO BSMART    PROCEDURES:  Unless otherwise noted below, none     Procedures      FINAL IMPRESSION      1. Suicidal ideation          DISPOSITION/PLAN   DISPOSITION Behavioral Health Hold 08/07/2024 12:37:33 PM      PATIENT REFERRED TO:  No follow-up provider specified.    DISCHARGE MEDICATIONS:  New Prescriptions    No medications on file         (Please note that portions of this note were completed with a voice recognition program.  Efforts were made to edit the dictations but occasionally words are mis-transcribed.)    Lorie Mays PA-C (electronically signed)  Emergency Attending Physician / Physician Assistant / Nurse Practitioner             Lorie Mays PA-C  08/07/24 7209

## 2024-08-07 NOTE — BSMART NOTE
Pt accepted to [facility] by Dr. Blanchard room#309. Nursing report [phone]. Og updated on admission.

## 2024-08-07 NOTE — BSMART NOTE
[facility] contacted for crisis assessment.    Addendum: Error on nursing reporting pt wanted to leave. No Crisis eval.

## 2024-08-08 PROBLEM — F11.10 OPIOID USE DISORDER, MILD, ABUSE (HCC): Status: ACTIVE | Noted: 2023-04-24

## 2024-08-08 PROBLEM — F39 MOOD DISORDER (HCC): Status: RESOLVED | Noted: 2017-06-25 | Resolved: 2024-08-08

## 2024-08-08 PROBLEM — F43.23 ADJUSTMENT DISORDER WITH MIXED ANXIETY AND DEPRESSED MOOD: Status: ACTIVE | Noted: 2024-08-08

## 2024-08-08 PROBLEM — F14.10 COCAINE USE DISORDER, MILD, ABUSE (HCC): Status: ACTIVE | Noted: 2024-08-08

## 2024-08-08 PROBLEM — F19.11 HISTORY OF SUBSTANCE ABUSE (HCC): Status: RESOLVED | Noted: 2024-02-14 | Resolved: 2024-08-08

## 2024-08-08 PROBLEM — F43.20 ADJUSTMENT DISORDER: Status: RESOLVED | Noted: 2023-07-02 | Resolved: 2024-08-08

## 2024-08-08 PROBLEM — F39 UNSPECIFIED MOOD (AFFECTIVE) DISORDER (HCC): Status: RESOLVED | Noted: 2024-08-07 | Resolved: 2024-08-08

## 2024-08-08 PROCEDURE — 1240000000 HC EMOTIONAL WELLNESS R&B

## 2024-08-08 PROCEDURE — 6370000000 HC RX 637 (ALT 250 FOR IP): Performed by: PSYCHIATRY & NEUROLOGY

## 2024-08-08 PROCEDURE — 6370000000 HC RX 637 (ALT 250 FOR IP)

## 2024-08-08 PROCEDURE — 99223 1ST HOSP IP/OBS HIGH 75: CPT | Performed by: PSYCHIATRY & NEUROLOGY

## 2024-08-08 RX ORDER — ASPIRIN 81 MG/1
81 TABLET ORAL DAILY
Status: DISCONTINUED | OUTPATIENT
Start: 2024-08-08 | End: 2024-08-14 | Stop reason: HOSPADM

## 2024-08-08 RX ORDER — ZIPRASIDONE HYDROCHLORIDE 40 MG/1
40 CAPSULE ORAL 2 TIMES DAILY WITH MEALS
Status: DISCONTINUED | OUTPATIENT
Start: 2024-08-08 | End: 2024-08-14 | Stop reason: HOSPADM

## 2024-08-08 RX ORDER — CLONAZEPAM 1 MG/1
1 TABLET ORAL 2 TIMES DAILY
Status: ON HOLD | COMMUNITY
End: 2024-08-13 | Stop reason: HOSPADM

## 2024-08-08 RX ORDER — CLONAZEPAM 0.5 MG/1
0.5 TABLET ORAL 2 TIMES DAILY
Status: DISCONTINUED | OUTPATIENT
Start: 2024-08-08 | End: 2024-08-14 | Stop reason: HOSPADM

## 2024-08-08 RX ADMIN — GABAPENTIN 400 MG: 100 CAPSULE ORAL at 21:39

## 2024-08-08 RX ADMIN — SERTRALINE HYDROCHLORIDE 200 MG: 50 TABLET ORAL at 12:07

## 2024-08-08 RX ADMIN — HYDROXYZINE HYDROCHLORIDE 50 MG: 25 TABLET ORAL at 21:46

## 2024-08-08 RX ADMIN — ASPIRIN 81 MG: 81 TABLET, COATED ORAL at 12:07

## 2024-08-08 RX ADMIN — HYDROXYZINE HYDROCHLORIDE 50 MG: 25 TABLET ORAL at 10:03

## 2024-08-08 RX ADMIN — ZIPRASIDONE HYDROCHLORIDE 40 MG: 40 CAPSULE ORAL at 17:23

## 2024-08-08 RX ADMIN — CLONAZEPAM 0.5 MG: 0.5 TABLET ORAL at 21:38

## 2024-08-08 RX ADMIN — GABAPENTIN 400 MG: 100 CAPSULE ORAL at 12:06

## 2024-08-08 RX ADMIN — HYDROXYZINE HYDROCHLORIDE 50 MG: 25 TABLET ORAL at 01:39

## 2024-08-08 RX ADMIN — ZIPRASIDONE HYDROCHLORIDE 40 MG: 40 CAPSULE ORAL at 12:07

## 2024-08-08 RX ADMIN — TRAZODONE HYDROCHLORIDE 50 MG: 50 TABLET ORAL at 01:39

## 2024-08-08 RX ADMIN — CLONAZEPAM 0.5 MG: 0.5 TABLET ORAL at 12:07

## 2024-08-08 ASSESSMENT — SLEEP AND FATIGUE QUESTIONNAIRES
AVERAGE NUMBER OF SLEEP HOURS: 7
SLEEP PATTERN: DIFFICULTY FALLING ASLEEP;RESTLESSNESS
DO YOU HAVE DIFFICULTY SLEEPING: YES
DO YOU USE A SLEEP AID: NO

## 2024-08-08 NOTE — CARE COORDINATION
08/08/24 0848   ITP   Date of Plan 08/08/24   Primary Diagnosis Code Unspecified mood (affective) disorder   Barriers to Treatment Need for psychoeducation;Psychiatric symptom (comment);Transportation  (Depression)   Strengths Incorporated in Plan Acknowledging need for assistance;Verbal;Support network   Plan of Care   Long Term Goal (LTG) Stated in patient/guardian terms Client will remain safe and stable in the community   Short Term Goal 1   Short Term Goal 1 Client will learn and demonstrate effective coping skills   Baseline Functioning Client is experiencing SI and depressive symptoms when in distress   Target Reduction in ideation and depressive symptoms through use of appropriate coping skills   Objectives Client will participate in group therapy;Other (comment)  (Client will meet with treatment team)   Intervention 1 Milieu therapy and support   Frequency On going   Measured by Staff observation;Self report   Staff Responsible Clinical staff;[facility] staff   Intervention 2 Referral to community services   Frequency Prior to discharge   Measured by Self report;Staff observation   Staff Responsible Clinical staff;[facility] staff   STG Goal 1 Status: Patient Appears to be  Progressing toward treatment plan goal  (Acknolwedging need for assistance)   Short Term Goal 2   Short Term Goal 2 Client will maintain compliance with medication regime   Baseline Functioning Client is experiencing severe symptoms of depression   Target Reduction in depressive symptoms through medications   Objectives Client will participate in group therapy;Other (comment)  (Client will meet with treatment team)   Intervention 1 Monitor medications   Frequency On going to monitor for safety and efficacy   Measured by Staff observation;Self report   Staff Responsible Clinical staff;[facility] staff   Intervention 2 Referral to community services   Frequency Prior to discharge   Measured by Self report;Staff observation   Staff Responsible Clinical

## 2024-08-08 NOTE — PROGRESS NOTES
[facility]: MED RECONCILIATION    Comments/Recommendations:   [facility]: [facility] , [facility] Query refill history, [facility] notes   Per patient, patient weaned off of Suboxone and has not been taking it.   Re: Pulmonary Embolism history, patient reports only being on aspirin (xarelto therapy completed) per outpatient MD.     Medications added:     Clonazepam - last filled 7/15/24 #60 per Dr. Meneses; pt reports taking twice daily vs PRN   Gabapentin 400 mg - last filled 7/30/24 #90  Suboxone 8 mg-2 mg - last filled 7/9/24 #60      Prior to Admission Medications:   Prior to Admission Medications   Prescriptions Last Dose Informant           aspirin 81 MG EC tablet Unknown Outside Pharmacy/PCP, Self   Sig: TAKE 1 TABLET BY MOUTH DAILY           clonazePAM (KLONOPIN) 1 MG tablet  Outside Pharmacy/PCP, Self   Sig: Take 1 tablet by mouth in the morning and at bedtime.   gabapentin (NEURONTIN) 400 MG capsule Past Week Outside Pharmacy/PCP, Self   Sig: Take 1 capsule by mouth 3 times daily.   Patient taking differently: Take 1 capsule by mouth 3 times daily.   sertraline (ZOLOFT) 100 MG tablet Past Week Outside Pharmacy/PCP, Self   Sig: Take 2 tablets by mouth daily   ziprasidone (GEODON) 40 MG capsule Past Week Outside Pharmacy/PCP, Self   Sig: Take 1 capsule by mouth 2 times daily (with meals)      Facility-Administered Medications: None               Martha Hanks RPH  Contact: [phone]

## 2024-08-08 NOTE — GROUP NOTE
Group Therapy Note    Date: 8/8/2024    Group Start Time: 1500  Group End Time: 1600  Group Topic: Recreational    [facility]    Jeanie Ferreira        Group Therapy Note    Attendees: 7       Patient's Goal:  To concentrate on selected task    Notes:  Pt did not attend session    Discipline Responsible: Recreational Therapist      Signature:  JEANIE FERREIRA

## 2024-08-08 NOTE — ED NOTES
TRANSFER - OUT REPORT:    Verbal report given to KARLA land on Luli Bartlett  being transferred to [facility] for routine progression of patient care       Report consisted of patient's Situation, Background, Assessment and   Recommendations(SBAR).     Information from the following report(s) Nurse Handoff Report, Index, ED Encounter Summary, and ED SBAR was reviewed with the receiving nurse.    [place] Assessment:    Presents to emergency department  because of falls (Syncope, seizure, or loss of consciousness): No  Age > 70: No  Altered Mental Status, Intoxication with alcohol or substance confusion (Disorientation, impaired judgment, poor safety awaremess, or inability to follow instructions): No  Impaired Mobility: Ambulates or transfers with assistive devices or assistance; Unable to ambulate or transer.: No  Nursing Judgement: No          Lines:       Opportunity for questions and clarification was provided.      Patient transported with:  [facility]

## 2024-08-08 NOTE — CARE COORDINATION
08/08/24 0847   Suicidal Ideation   Wish to be Dead Lifetime - Yes;Past 1 month - Yes   Non-Specific Active Suicidal Thoughts Past 1 month - Yes;Lifetime - Yes   Suicidal Behavior Trigger Wish to be Dead   Active Suicidal Ideation with Any Methods (Not Plan) without Intent to Act Lifetime - Yes;Past 1 month - Yes   Active Suicidal Ideation with Some Intent to Act, without Specific Plan Past 1 month - Yes;Lifetime - Yes   Active Suicidal Ideation with Specific Plan and Intent Lifetime - Yes;Past 1 month - Yes   Intensity of Ideation   Lifetime - Most Severe Ideation 5 - most severe   Lifetime - Most Recent Ideation 5 - most severe   Frequency Daily or almost daily   Duration Less than 1 hour/some of the time   Controllability Can control thoughts with a lot of difficulty   Deterrents Deterrents most likely did not stop you   Reasons for Ideation Mostly to end or stop the pain   Suicidal Behavior   Actual Attempt Lifetime - Yes;Past 3 months - No   Has subject engaged in Non-Suicidal Self-Injurious Behavior? Lifetime - No;Past 3 months - No   Interrupted Attempt Lifetime - Yes;Past 3 months - Yes   Aborted or Self-Interrupted Attempt Lifetime - No;Past 3 months - No   Preparatory Acts or Behavior Lifetime - No;Past 3 months - No

## 2024-08-08 NOTE — PROGRESS NOTES
[facility]  Admission Note     Admission Type:   Admission Type: Voluntary    Reason for admission:  Reason for Admission:  (Pt stated they are here due to depression, SI thoughts to shoot self, and auditory and visual hallucinations. Pt stated she felt a lot of stress that pushed her to the point of making her think to commint suicide.)    PATIENT STRENGTHS:   Pt ability to communicate needs, Able to complete ADL's independently.     Patient  Limitations:   Pt inability to cope with life stressors, Poor insight.         Addictive Behavior: polysubstance abuse        Medical Problems:   Past Medical History:   Diagnosis Date    Acute pulmonary embolism (formerly [facility]) 05/09/2022    Iron deficiency anemia 08/22/2022    Opioid use disorder, mild, abuse (formerly [facility]) 04/24/2023    Polysubstance abuse (formerly [facility]) 07/24/2023 7.2023: per pt clean since 4/2023, getting ready to start outpatient program    Psychiatric disorder     anxiety    Psychiatric disorder     depression    Psychosis (formerly [facility]) 04/21/2023       Status EXAM:  Mental Status and Behavioral Exam  Normal: No  Level of Assistance: Independent/Self  Facial Expression: Flat  Affect: Blunt  Level of Consciousness: Alert  Frequency of Checks: 4 times per hour, close  Mood:Normal: Yes  Motor Activity:Normal: Yes  Eye Contact: Fair  Observed Behavior: Cooperative, Guarded, Preoccupied  Sexual Misconduct History: Current - no  Preception: [place] to person, [place] to time, [place] to place, [place] to situation  Attention:Normal: Yes  Thought Processes: Unremarkable  Thought Content:Normal: Yes  Depression Symptoms: Sleep disturbance, Increased irritability, Change in energy level  Anxiety Symptoms: Generalized  Shruti Symptoms: No problems reported or observed.  Hallucinations: None  Delusions: No  Memory:Normal: Yes  Insight and Judgment: No  Insight and Judgment: Poor judgment, Poor insight    Pt admitted with followings belongings:  Dental Appliances: None  Vision -

## 2024-08-08 NOTE — GROUP NOTE
Group Therapy Note    Date: 8/8/2024    Group Start Time: 0910  Group End Time: 0925  Group Topic: Community Meeting    [facility] 3 ACUTE [facility]    Syl Marcelo        Group Therapy Note    Attendees: 6       Notes:  Patient was notified that community group is taking place in the day room. She chose not to attend.       Discipline Responsible: Behavorial Health Tech      Signature:  SYL MARCELO

## 2024-08-08 NOTE — GROUP NOTE
Group Therapy Note    Date: 8/8/2024    Group Start Time: 1100  Group End Time: 1130  Group Topic: Recreational    [facility]    Jeanie Ferreira        Group Therapy Note    Attendees: 5       Patient's Goal:  To participate in relaxation activity    Notes:  Pt did not attend session    Discipline Responsible: Recreational Therapist      Signature:  JEANIE FERREIRA

## 2024-08-08 NOTE — H&P
negative.         MENTAL STATUS EXAM & VITALS     MENTAL STATUS EXAM (MSE):    MSE findings are within normal limits (WNL) unless otherwise stated below. (all of the below categories of the MSE have been reviewed (reviewed 8/8/2024) and updated as deemed appropriate)    General Presentation age appropriate, cooperative and guarded   Orientation oriented to time, place and person   Language No aphasia or dysarthria   Speech delayed and soft   Thought Processes normal rate of thoughts, fair abstract reasoning/computation   Thought Content preoccupations   Suicidal Ideations contracts for safety   Homicidal Ideations none   Mood:  depressed and dysthymic   Affect:  Constricted   Memory recent  intact   Concentration/Attention:  intact   Fund of Knowledge average   Insight and Judgment: limited        VITALS:     Vitals:    08/07/24 2345   BP: 108/89   Pulse: 93   Resp: 18   Temp: 98 °F (36.7 °C)   SpO2: 98%           DATA     LABORATORY DATA:    Admission on 08/07/2024, Discharged on 08/07/2024   Component Date Value Ref Range Status   • Acetaminophen Level 08/07/2024 <2 (L)  10 - 30 ug/mL Final   • Salicylate Lvl 08/07/2024 <1.7 (L)  2.8 - 20.0 MG/DL Final   • Amphetamine, Urine 08/07/2024 Negative  NEG   Final   • Barbiturates, Urine 08/07/2024 Negative  NEG   Final   • Benzodiazepines, Urine 08/07/2024 Positive (A)  NEG   Final   • Cocaine, Urine 08/07/2024 Positive (A)  NEG   Final   • Methadone, Urine 08/07/2024 Negative  NEG   Final   • Opiates, Urine 08/07/2024 Positive (A)  NEG   Final   • Phencyclidine, Urine 08/07/2024 Negative  NEG   Final   • THC, TH-Cannabinol, Urine 08/07/2024 Negative  NEG   Final   • Comments: 08/07/2024 (NOTE)   Final   • Specimen HOld 08/07/2024 Urine on hold in Microbiology dept for 2 days.  If unpreserved urine is submitted, it cannot be used for addtional testing after 24 hours, recollection will be required.    Final   • Color, UA 08/07/2024 YELLOW/STRAW    Final   • Appearance

## 2024-08-08 NOTE — PROGRESS NOTES
C-SSRS Note     Provider Vidya Crawford NP, was notified about patient, Luli taking two pills while in the presence of the transport staff while in transport to the  unit. NP questioned about why the patient was able to have her belongings with her and that her belongings was suppose to be checked. Provider was informed of Patient's presentation that Luli is alert and oriented times four. Pt did not show any adverse reactions to the medications she ingested. Patient will continued to be monitored for any respiratory distress and safety.    Upon arrival on the unit Suicide precautions and constant observation orders initiated due to Patient had scored High risk from [facility] ED. Provider was notified of Pt's current C-SSRS screening results of Moderate Risk and patient presentation. Patient denies wanting to harm self while on the unit stating \"I want to get and have my medications adjusted so I can get better\". Pt stated she has a reason to live for her daughters. Pt agrees to notify nursing staff if thoughts of self-harm on the unit arise. Suicide precautions and constant observations discontinued by Vidya Crawford NP via telephone with read back at [phone]. [facility] orders obtained and Q 15 minute checks continued for safety.

## 2024-08-09 LAB
CHOLEST SERPL-MCNC: 236 MG/DL
EST. AVERAGE GLUCOSE BLD GHB EST-MCNC: 103 MG/DL
HBA1C MFR BLD: 5.2 % (ref 4–5.6)
HDLC SERPL-MCNC: 45 MG/DL
HDLC SERPL: 5.2 (ref 0–5)
LDLC SERPL CALC-MCNC: 168.4 MG/DL (ref 0–100)
TRIGL SERPL-MCNC: 113 MG/DL
VLDLC SERPL CALC-MCNC: 22.6 MG/DL

## 2024-08-09 PROCEDURE — 80061 LIPID PANEL: CPT

## 2024-08-09 PROCEDURE — 36415 COLL VENOUS BLD VENIPUNCTURE: CPT

## 2024-08-09 PROCEDURE — 6370000000 HC RX 637 (ALT 250 FOR IP): Performed by: PSYCHIATRY & NEUROLOGY

## 2024-08-09 PROCEDURE — 1240000000 HC EMOTIONAL WELLNESS R&B

## 2024-08-09 PROCEDURE — 99232 SBSQ HOSP IP/OBS MODERATE 35: CPT | Performed by: PSYCHIATRY & NEUROLOGY

## 2024-08-09 PROCEDURE — 83036 HEMOGLOBIN GLYCOSYLATED A1C: CPT

## 2024-08-09 PROCEDURE — 6370000000 HC RX 637 (ALT 250 FOR IP)

## 2024-08-09 RX ADMIN — ZIPRASIDONE HYDROCHLORIDE 40 MG: 40 CAPSULE ORAL at 08:37

## 2024-08-09 RX ADMIN — GABAPENTIN 400 MG: 100 CAPSULE ORAL at 13:59

## 2024-08-09 RX ADMIN — HYDROXYZINE HYDROCHLORIDE 50 MG: 25 TABLET ORAL at 11:18

## 2024-08-09 RX ADMIN — HYDROXYZINE HYDROCHLORIDE 50 MG: 25 TABLET ORAL at 20:07

## 2024-08-09 RX ADMIN — GABAPENTIN 400 MG: 100 CAPSULE ORAL at 08:37

## 2024-08-09 RX ADMIN — GABAPENTIN 400 MG: 100 CAPSULE ORAL at 20:07

## 2024-08-09 RX ADMIN — CLONAZEPAM 0.5 MG: 0.5 TABLET ORAL at 08:37

## 2024-08-09 RX ADMIN — ASPIRIN 81 MG: 81 TABLET, COATED ORAL at 08:37

## 2024-08-09 RX ADMIN — CLONAZEPAM 0.5 MG: 0.5 TABLET ORAL at 20:07

## 2024-08-09 RX ADMIN — SERTRALINE HYDROCHLORIDE 200 MG: 50 TABLET ORAL at 08:36

## 2024-08-09 RX ADMIN — ZIPRASIDONE HYDROCHLORIDE 40 MG: 40 CAPSULE ORAL at 18:09

## 2024-08-09 NOTE — GROUP NOTE
Group Therapy Note    Date: 8/9/2024    Group Start Time: 1400  Group End Time: 1500  Group Topic: Recreational    [facility]    Jeanie Ferreira        Group Therapy Note    Attendees: 5       Patient's Goal:  To participate in relaxation activity    Notes:  Pt did not attend session    Discipline Responsible: Recreational Therapist      Signature:  JEANIE FERREIRA

## 2024-08-09 NOTE — PROGRESS NOTES
Laboratory Monitoring for Mood Stabilizers and Antipsychotics    Recommended baseline monitoring has been completed based on this patient's current medication regimen.        This patient is currently prescribed the following medication(s):   Current Facility-Administered Medications: aspirin EC tablet 81 mg, 81 mg, Oral, Daily  clonazePAM (KLONOPIN) tablet 0.5 mg, 0.5 mg, Oral, BID  gabapentin (NEURONTIN) capsule 400 mg, 400 mg, Oral, TID  sertraline (ZOLOFT) tablet 200 mg, 200 mg, Oral, Daily  ziprasidone (GEODON) capsule 40 mg, 40 mg, Oral, BID WC  acetaminophen (TYLENOL) tablet 650 mg, 650 mg, Oral, Q4H PRN  polyethylene glycol (GLYCOLAX) packet 17 g, 17 g, Oral, Daily PRN  aluminum & magnesium hydroxide-simethicone (MAALOX) 200-200-20 MG/5ML suspension 30 mL, 30 mL, Oral, Q6H PRN  hydrOXYzine HCl (ATARAX) tablet 50 mg, 50 mg, Oral, TID PRN  haloperidol (HALDOL) tablet 5 mg, 5 mg, Oral, Q4H PRN **OR** haloperidol lactate (HALDOL) injection 5 mg, 5 mg, IntraMUSCular, Q4H PRN  diphenhydrAMINE (BENADRYL) injection 50 mg, 50 mg, IntraMUSCular, Q4H PRN  traZODone (DESYREL) tablet 50 mg, 50 mg, Oral, Nightly PRN      The following labs have been completed for monitoring of antipsychotics and/or mood stabilizers:    Height, Weight, BMI Estimation  Estimated body mass index is 41.76 kg/m² as calculated from the following:    Height as of this encounter: 1.575 m (5' 2.01\").    Weight as of this encounter: 103.6 kg (228 lb 6.3 oz).     Vital Signs/Blood Pressure  /72   Pulse 92   Temp 97.7 °F (36.5 °C) (Oral)   Resp 16   Ht 1.575 m (5' 2.01\")   Wt 103.6 kg (228 lb 6.3 oz)   SpO2 98%   BMI 41.76 kg/m²     Renal Function, Hepatic Function and Chemistry  Estimated Creatinine Clearance: 85 mL/min (based on SCr of 0.98 mg/dL).    Lab Results   Component Value Date/Time     08/07/2024 08:37 AM    K 3.6 08/07/2024 08:37 AM     08/07/2024 08:37 AM    CO2 28 08/07/2024 08:37 AM    ANIONGAP 5 08/07/2024

## 2024-08-10 PROCEDURE — 6370000000 HC RX 637 (ALT 250 FOR IP): Performed by: PSYCHIATRY & NEUROLOGY

## 2024-08-10 PROCEDURE — 1240000000 HC EMOTIONAL WELLNESS R&B

## 2024-08-10 RX ADMIN — ASPIRIN 81 MG: 81 TABLET, COATED ORAL at 08:10

## 2024-08-10 RX ADMIN — CLONAZEPAM 0.5 MG: 0.5 TABLET ORAL at 20:29

## 2024-08-10 RX ADMIN — CLONAZEPAM 0.5 MG: 0.5 TABLET ORAL at 08:10

## 2024-08-10 RX ADMIN — ZIPRASIDONE HYDROCHLORIDE 40 MG: 40 CAPSULE ORAL at 08:10

## 2024-08-10 RX ADMIN — GABAPENTIN 400 MG: 100 CAPSULE ORAL at 08:10

## 2024-08-10 RX ADMIN — GABAPENTIN 400 MG: 100 CAPSULE ORAL at 14:28

## 2024-08-10 RX ADMIN — GABAPENTIN 400 MG: 100 CAPSULE ORAL at 20:29

## 2024-08-10 RX ADMIN — ZIPRASIDONE HYDROCHLORIDE 40 MG: 40 CAPSULE ORAL at 17:40

## 2024-08-10 RX ADMIN — SERTRALINE HYDROCHLORIDE 200 MG: 50 TABLET ORAL at 08:09

## 2024-08-11 PROCEDURE — 1240000000 HC EMOTIONAL WELLNESS R&B

## 2024-08-11 PROCEDURE — 6370000000 HC RX 637 (ALT 250 FOR IP)

## 2024-08-11 PROCEDURE — 6370000000 HC RX 637 (ALT 250 FOR IP): Performed by: PSYCHIATRY & NEUROLOGY

## 2024-08-11 RX ADMIN — GABAPENTIN 400 MG: 100 CAPSULE ORAL at 14:13

## 2024-08-11 RX ADMIN — GABAPENTIN 400 MG: 100 CAPSULE ORAL at 20:01

## 2024-08-11 RX ADMIN — SERTRALINE HYDROCHLORIDE 200 MG: 50 TABLET ORAL at 08:18

## 2024-08-11 RX ADMIN — ASPIRIN 81 MG: 81 TABLET, COATED ORAL at 08:18

## 2024-08-11 RX ADMIN — CLONAZEPAM 0.5 MG: 0.5 TABLET ORAL at 08:18

## 2024-08-11 RX ADMIN — HYDROXYZINE HYDROCHLORIDE 50 MG: 25 TABLET ORAL at 16:39

## 2024-08-11 RX ADMIN — CLONAZEPAM 0.5 MG: 0.5 TABLET ORAL at 20:02

## 2024-08-11 RX ADMIN — ZIPRASIDONE HYDROCHLORIDE 40 MG: 40 CAPSULE ORAL at 08:18

## 2024-08-11 RX ADMIN — GABAPENTIN 400 MG: 100 CAPSULE ORAL at 08:18

## 2024-08-11 RX ADMIN — ZIPRASIDONE HYDROCHLORIDE 40 MG: 40 CAPSULE ORAL at 16:39

## 2024-08-11 RX ADMIN — TRAZODONE HYDROCHLORIDE 50 MG: 50 TABLET ORAL at 20:01

## 2024-08-12 PROCEDURE — 6370000000 HC RX 637 (ALT 250 FOR IP)

## 2024-08-12 PROCEDURE — 1240000000 HC EMOTIONAL WELLNESS R&B

## 2024-08-12 PROCEDURE — 6370000000 HC RX 637 (ALT 250 FOR IP): Performed by: PSYCHIATRY & NEUROLOGY

## 2024-08-12 PROCEDURE — 99232 SBSQ HOSP IP/OBS MODERATE 35: CPT | Performed by: PSYCHIATRY & NEUROLOGY

## 2024-08-12 RX ADMIN — GABAPENTIN 400 MG: 100 CAPSULE ORAL at 13:40

## 2024-08-12 RX ADMIN — CLONAZEPAM 0.5 MG: 0.5 TABLET ORAL at 20:23

## 2024-08-12 RX ADMIN — ASPIRIN 81 MG: 81 TABLET, COATED ORAL at 08:16

## 2024-08-12 RX ADMIN — TRAZODONE HYDROCHLORIDE 50 MG: 50 TABLET ORAL at 20:23

## 2024-08-12 RX ADMIN — SERTRALINE HYDROCHLORIDE 200 MG: 50 TABLET ORAL at 08:16

## 2024-08-12 RX ADMIN — GABAPENTIN 400 MG: 100 CAPSULE ORAL at 20:23

## 2024-08-12 RX ADMIN — CLONAZEPAM 0.5 MG: 0.5 TABLET ORAL at 08:16

## 2024-08-12 RX ADMIN — GABAPENTIN 400 MG: 100 CAPSULE ORAL at 08:16

## 2024-08-12 RX ADMIN — ZIPRASIDONE HYDROCHLORIDE 40 MG: 40 CAPSULE ORAL at 17:15

## 2024-08-12 RX ADMIN — HYDROXYZINE HYDROCHLORIDE 50 MG: 25 TABLET ORAL at 19:06

## 2024-08-12 RX ADMIN — ZIPRASIDONE HYDROCHLORIDE 40 MG: 40 CAPSULE ORAL at 08:16

## 2024-08-12 NOTE — GROUP NOTE
Group Therapy Note    Date: 8/12/2024    Group Start Time: 1000  Group End Time: 1100  Group Topic: Relaxation    [facility] 3 ACUTE BEH[facility]    Jeanie Ferreira        Group Therapy Note    Attendees: 5       Patient's Goal:  To participate in relaxation activity      Status After Intervention:  Improved    Participation Level: Active Listener and Interactive    Participation Quality: Appropriate, Attentive, and Sharing      Speech:  normal      Thought Process/Content: Logical      Affective Functioning: Congruent        Level of consciousness:  Alert, Oriented x4, and Attentive      Response to Learning: Progressing to goal      Endings: None Reported    Modes of Intervention: Activity      Discipline Responsible: Recreational Therapist      Signature:  JEANIE FERREIRA

## 2024-08-13 PROCEDURE — 6370000000 HC RX 637 (ALT 250 FOR IP)

## 2024-08-13 PROCEDURE — 6370000000 HC RX 637 (ALT 250 FOR IP): Performed by: PSYCHIATRY & NEUROLOGY

## 2024-08-13 PROCEDURE — 99232 SBSQ HOSP IP/OBS MODERATE 35: CPT | Performed by: PSYCHIATRY & NEUROLOGY

## 2024-08-13 PROCEDURE — 1240000000 HC EMOTIONAL WELLNESS R&B

## 2024-08-13 RX ORDER — ASPIRIN 81 MG/1
81 TABLET ORAL DAILY
Qty: 30 TABLET | Refills: 1 | Status: SHIPPED | OUTPATIENT
Start: 2024-08-14 | End: 2024-08-14

## 2024-08-13 RX ORDER — GABAPENTIN 400 MG/1
400 CAPSULE ORAL 3 TIMES DAILY
Qty: 90 CAPSULE | Refills: 1 | Status: SHIPPED | OUTPATIENT
Start: 2024-08-13 | End: 2024-08-14

## 2024-08-13 RX ORDER — HYDROXYZINE 50 MG/1
50 TABLET, FILM COATED ORAL 2 TIMES DAILY PRN
Qty: 60 TABLET | Refills: 1 | Status: SHIPPED | OUTPATIENT
Start: 2024-08-13 | End: 2024-08-14

## 2024-08-13 RX ORDER — SERTRALINE HYDROCHLORIDE 100 MG/1
200 TABLET, FILM COATED ORAL DAILY
Qty: 60 TABLET | Refills: 1 | Status: SHIPPED | OUTPATIENT
Start: 2024-08-13 | End: 2024-08-14

## 2024-08-13 RX ORDER — CLONAZEPAM 0.5 MG/1
0.5 TABLET ORAL 2 TIMES DAILY
Qty: 60 TABLET | Refills: 1 | Status: SHIPPED | OUTPATIENT
Start: 2024-08-13 | End: 2024-08-14

## 2024-08-13 RX ORDER — TRAZODONE HYDROCHLORIDE 50 MG/1
50 TABLET ORAL NIGHTLY PRN
Qty: 30 TABLET | Refills: 1 | Status: SHIPPED | OUTPATIENT
Start: 2024-08-13 | End: 2024-08-14

## 2024-08-13 RX ORDER — ZIPRASIDONE HYDROCHLORIDE 40 MG/1
40 CAPSULE ORAL 2 TIMES DAILY WITH MEALS
Qty: 60 CAPSULE | Refills: 1 | Status: SHIPPED | OUTPATIENT
Start: 2024-08-13 | End: 2024-08-14

## 2024-08-13 RX ADMIN — GABAPENTIN 400 MG: 100 CAPSULE ORAL at 08:37

## 2024-08-13 RX ADMIN — ZIPRASIDONE HYDROCHLORIDE 40 MG: 40 CAPSULE ORAL at 18:59

## 2024-08-13 RX ADMIN — CLONAZEPAM 0.5 MG: 0.5 TABLET ORAL at 08:37

## 2024-08-13 RX ADMIN — SERTRALINE HYDROCHLORIDE 200 MG: 50 TABLET ORAL at 08:37

## 2024-08-13 RX ADMIN — GABAPENTIN 400 MG: 100 CAPSULE ORAL at 20:30

## 2024-08-13 RX ADMIN — ZIPRASIDONE HYDROCHLORIDE 40 MG: 40 CAPSULE ORAL at 08:37

## 2024-08-13 RX ADMIN — GABAPENTIN 400 MG: 100 CAPSULE ORAL at 14:04

## 2024-08-13 RX ADMIN — TRAZODONE HYDROCHLORIDE 50 MG: 50 TABLET ORAL at 20:31

## 2024-08-13 RX ADMIN — CLONAZEPAM 0.5 MG: 0.5 TABLET ORAL at 20:30

## 2024-08-13 ASSESSMENT — PAIN SCALES - GENERAL: PAINLEVEL_OUTOF10: 0

## 2024-08-13 NOTE — GROUP NOTE
Group Therapy Note    Date: 8/13/2024    Group Start Time: 1500  Group End Time: 1600  Group Topic: Recreational    [facility]    Jeanie Ferreira        Group Therapy Note    Attendees: 7       Patient's Goal:  To concentrate on selected task    Notes:  Pleasant,active participant    Discipline Responsible: Recreational Therapist      Signature:  JEANIE FERREIRA

## 2024-08-13 NOTE — DISCHARGE INSTRUCTIONS
DISCHARGE SUMMARY    NAME:Luli Bartlett  : 1985  MRN: [id number]    The patient Luli Bartlett exhibits the ability to control behavior in a less restrictive environment.  Patient's level of functioning is improving.  No assaultive/destructive behavior has been observed for the past 24 hours.  No suicidal/homicidal threat or behavior has been observed for the past 24 hours.  There is no evidence of serious medication side effects.  Patient has not been in physical or protective restraints for at least the past 24 hours.    If weapons involved, how are they secured? No weapons    Is patient aware of and in agreement with discharge plan? Yes    Arrangements for medication:  Prescriptions sent to preferred pharmacy     Copy of discharge instructions to provider?:  Yes    Arrangements for transportation home:  Lyft     Keep all follow up appointments as scheduled, continue to take prescribed medications per physician instructions.  Mental health crisis number:  911 or your local mental health crisis line number at [phone]      Mental Health Emergency WARM LINE      5-371-046-MHAV ([phone])      M-F: 9am to 9pm      Sat & Sun: 5pm - 9pm  National suicide prevention lines:                             1-976-GDYAPGA (9-949-109-3854)       4-519-958-TALK (1-324.208.3718)    Crisis Text Line:  Text HOME to 856802    SUICIDE HOTLINES:    If you or someone you care about is suicidal, please contact any of the following toll-free 24-hour crisis hotlines for suicide prevention and support. Your call is free and confidential.    National Suicide Prevention Lifeline at 988, www.suicidepreventionlifeline.org    The National Hopeline Network at 0-215-317-HOPE (1598)    You may also contact [employer] at www."[employer] to access a live online network of volunteers trained and certified in crisis intervention or text HOME to 309083 to be connected with a live, trained crisis counselor through Crisis Text Line who will help

## 2024-08-13 NOTE — PROGRESS NOTES
Pt screened per LOS policy.  No acute nutrition or weight issues identified.  Pt meal compliant.  Hx notable for HLD, polysubstance abuse, iron def anemia.  Pt has intentionally lost 40# x 2 years.  Ht: 5'2.01\"  Wt: 228 lb 6.3 oz  BMI: 41.76 kg/(m^2) c/w obesity class III (morbid)  Est energy needs: 1830 kcal, 68 g protein, 2200 mL fluids  Pt will consume > 75% of meals at follow up 7-10 days  LOS

## 2024-08-14 VITALS
HEART RATE: 94 BPM | OXYGEN SATURATION: 97 % | DIASTOLIC BLOOD PRESSURE: 78 MMHG | TEMPERATURE: 97.9 F | SYSTOLIC BLOOD PRESSURE: 121 MMHG | WEIGHT: 228.4 LBS | HEIGHT: 62 IN | BODY MASS INDEX: 42.03 KG/M2 | RESPIRATION RATE: 18 BRPM

## 2024-08-14 PROCEDURE — 6370000000 HC RX 637 (ALT 250 FOR IP): Performed by: PSYCHIATRY & NEUROLOGY

## 2024-08-14 RX ORDER — GABAPENTIN 400 MG/1
400 CAPSULE ORAL 3 TIMES DAILY
Qty: 90 CAPSULE | Refills: 1 | Status: SHIPPED | OUTPATIENT
Start: 2024-08-14 | End: 2024-10-13

## 2024-08-14 RX ORDER — CLONAZEPAM 0.5 MG/1
0.5 TABLET ORAL 2 TIMES DAILY
Qty: 60 TABLET | Refills: 1 | Status: SHIPPED | OUTPATIENT
Start: 2024-08-14 | End: 2024-10-13

## 2024-08-14 RX ORDER — TRAZODONE HYDROCHLORIDE 50 MG/1
50 TABLET ORAL NIGHTLY PRN
Qty: 30 TABLET | Refills: 1 | Status: SHIPPED | OUTPATIENT
Start: 2024-08-14

## 2024-08-14 RX ORDER — ASPIRIN 81 MG/1
81 TABLET ORAL DAILY
Qty: 30 TABLET | Refills: 1 | Status: SHIPPED | OUTPATIENT
Start: 2024-08-14

## 2024-08-14 RX ORDER — HYDROXYZINE 50 MG/1
50 TABLET, FILM COATED ORAL 2 TIMES DAILY PRN
Qty: 60 TABLET | Refills: 1 | Status: SHIPPED | OUTPATIENT
Start: 2024-08-14 | End: 2024-10-13

## 2024-08-14 RX ORDER — ZIPRASIDONE HYDROCHLORIDE 40 MG/1
40 CAPSULE ORAL 2 TIMES DAILY WITH MEALS
Qty: 60 CAPSULE | Refills: 1 | Status: SHIPPED | OUTPATIENT
Start: 2024-08-14

## 2024-08-14 RX ORDER — SERTRALINE HYDROCHLORIDE 100 MG/1
200 TABLET, FILM COATED ORAL DAILY
Qty: 60 TABLET | Refills: 1 | Status: SHIPPED | OUTPATIENT
Start: 2024-08-14

## 2024-08-14 RX ADMIN — ZIPRASIDONE HYDROCHLORIDE 40 MG: 40 CAPSULE ORAL at 08:14

## 2024-08-14 RX ADMIN — GABAPENTIN 400 MG: 100 CAPSULE ORAL at 08:13

## 2024-08-14 RX ADMIN — CLONAZEPAM 0.5 MG: 0.5 TABLET ORAL at 08:14

## 2024-08-14 RX ADMIN — SERTRALINE HYDROCHLORIDE 200 MG: 50 TABLET ORAL at 08:13

## 2024-08-14 NOTE — PROGRESS NOTES
Pharmacist Discharge Medication Reconciliation    Discharging Provider: Deandre       Discharge Medications:   Current Discharge Medication List        START taking these medications    Details   hydrOXYzine HCl (ATARAX) 50 MG tablet Take 1 tablet by mouth 2 times daily as needed for Anxiety  Qty: 60 tablet, Refills: 1      traZODone (DESYREL) 50 MG tablet Take 1 tablet by mouth nightly as needed for Sleep  Qty: 30 tablet, Refills: 1           CONTINUE these medications which have CHANGED    Details   aspirin 81 MG EC tablet Take 1 tablet by mouth daily  Qty: 30 tablet, Refills: 1      clonazePAM (KLONOPIN) 0.5 MG tablet Take 1 tablet by mouth in the morning and at bedtime for 60 days. Max Daily Amount: 1 mg  Qty: 60 tablet, Refills: 1    Associated Diagnoses: Adjustment disorder with mixed anxiety and depressed mood      gabapentin (NEURONTIN) 400 MG capsule Take 1 capsule by mouth 3 times daily for 60 days. Max Daily Amount: 1,200 mg  Qty: 90 capsule, Refills: 1    Associated Diagnoses: Adjustment disorder with mixed anxiety and depressed mood      sertraline (ZOLOFT) 100 MG tablet Take 2 tablets by mouth daily  Qty: 60 tablet, Refills: 1      ziprasidone (GEODON) 40 MG capsule Take 1 capsule by mouth 2 times daily (with meals)  Qty: 60 capsule, Refills: 1           STOP taking these medications       buprenorphine-naloxone (SUBOXONE) 8-2 MG FILM SL film Comments:   Reason for Stopping:         Compression Stockings MISC Comments:   Reason for Stopping:               The patient's chart, MAR and AVS were reviewed by Martha Hanks RPH.

## 2024-08-14 NOTE — GROUP NOTE
Group Therapy Note    Date: 8/14/2024    Group Start Time: 1000  Group End Time: 1100  Group Topic: Relaxation    [facility] 3 ACUTE BEH[facility]    Jeanie Ferreira        Group Therapy Note    Attendees: 10       Patient's Goal:  To participate in relaxation activity      Status After Intervention:  Improved    Participation Level: Active Listener and Interactive    Participation Quality: Appropriate, Attentive, and Sharing      Speech:  normal      Thought Process/Content: Logical      Affective Functioning: Congruent      Level of consciousness:  Alert, Oriented x4, and Attentive      Response to Learning: Progressing to goal      Endings: None Reported    Modes of Intervention: Activity      Discipline Responsible: Recreational Therapist      Signature:  JEANIE FERREIRA

## 2024-08-14 NOTE — PLAN OF CARE
Problem: Behavior  Goal: Pt/Family maintain appropriate behavior and adhere to behavioral management agreement, if implemented  Description: INTERVENTIONS:  1. Assess patient/family's coping skills and  non-compliant behavior (including use of illegal substances)  2. Notify security of behavior or suspected illegal substances which indicate the need for search of the family and/or belongings  3. Encourage verbalization of thoughts and concerns in a socially appropriate manner  4. Utilize positive, consistent limit setting strategies supporting safety of patient, staff and others  5. Encourage participation in the decision making process about the behavioral management agreement  6. If a visitor's behavior poses a threat to safety call refer to organization policy.  7. Initiate consult with , Psychosocial CNS, Spiritual Care as appropriate  Outcome: Progressing  Flowsheets (Taken 8/12/2024 0813 by Cary Levine RN)  Patient/family maintains appropriate behavior and adheres to behavioral management agreement, if implemented: Notify security of behavior or suspected illegal substances which indicate the need for search of the patient and/or belongings     Problem: Safety - Adult  Goal: Free from fall injury  Outcome: Progressing     
  Problem: Discharge Planning  Goal: Discharge to home or other facility with appropriate resources  Outcome: Adequate for Discharge     Problem: ABCDS Injury Assessment  Goal: Absence of physical injury  Outcome: Completed     Problem: Anxiety  Goal: Will report anxiety at manageable levels  Description: INTERVENTIONS:  1. Administer medication as ordered  2. Teach and rehearse alternative coping skills  3. Provide emotional support with 1:1 interaction with staff  Outcome: Progressing     Problem: Coping  Goal: Pt/Family able to verbalize concerns and demonstrate effective coping strategies  Description: INTERVENTIONS:  1. Assist patient/family to identify coping skills, available support systems and cultural and spiritual values  2. Provide emotional support, including active listening and acknowledgement of concerns of patient and caregivers  3. Reduce environmental stimuli, as able  4. Instruct patient/family in relaxation techniques, as appropriate  5. Assess for spiritual pain/suffering and initiate Spiritual Care, Psychosocial Clinical Specialist consults as needed  Outcome: Progressing     
  Problem: Self Harm/Suicidality  Goal: Will have no self-injury during hospital stay  Description: INTERVENTIONS:  1.  Ensure constant observer at bedside with Q15M safety checks  2.  Maintain a safe environment  3.  Secure patient belongings  4.  Ensure family/visitors adhere to safety recommendations  5.  Ensure safety tray has been added to patient's diet order  6.  Every shift and PRN: Re-assess suicidal risk via Frequent Screener    8/10/2024 1127 by Jaz Wilks RN  Outcome: Not Progressing     Problem: Anxiety  Goal: Will report anxiety at manageable levels  Description: INTERVENTIONS:  1. Administer medication as ordered  2. Teach and rehearse alternative coping skills  3. Provide emotional support with 1:1 interaction with staff  Outcome: Not Progressing     Problem: Coping  Goal: Pt/Family able to verbalize concerns and demonstrate effective coping strategies  Description: INTERVENTIONS:  1. Assist patient/family to identify coping skills, available support systems and cultural and spiritual values  2. Provide emotional support, including active listening and acknowledgement of concerns of patient and caregivers  3. Reduce environmental stimuli, as able  4. Instruct patient/family in relaxation techniques, as appropriate  5. Assess for spiritual pain/suffering and initiate Spiritual Care, Psychosocial Clinical Specialist consults as needed  Outcome: Not Progressing     Problem: Depression/Self Harm  Goal: Effect of psychiatric condition will be minimized and patient will be protected from self harm  Description: INTERVENTIONS:  1. Assess impact of patient's symptoms on level of functioning, self care needs and offer support as indicated  2. Assess patient/family knowledge of depression, impact on illness and need for teaching  3. Provide emotional support, presence and reassurance  4. Assess for possible suicidal thoughts or ideation. If patient expresses suicidal thoughts or statements do not leave 
  Problem: Self Harm/Suicidality  Goal: Will have no self-injury during hospital stay  Description: INTERVENTIONS:  1.  Ensure constant observer at bedside with Q15M safety checks  2.  Maintain a safe environment  3.  Secure patient belongings  4.  Ensure family/visitors adhere to safety recommendations  5.  Ensure safety tray has been added to patient's diet order  6.  Every shift and PRN: Re-assess suicidal risk via Frequent Screener    8/10/2024 1127 by Jaz Wilks RN  Outcome: Not Progressing  8/9/2024 2135 by Kimberly Henry RN  Outcome: Not Progressing     Problem: Anxiety  Goal: Will report anxiety at manageable levels  Description: INTERVENTIONS:  1. Administer medication as ordered  2. Teach and rehearse alternative coping skills  3. Provide emotional support with 1:1 interaction with staff  8/9/2024 2135 by Kimberly Henry RN  Outcome: Not Progressing  Flowsheets (Taken 8/9/2024 0930 by Cary Levine RN)  Will report anxiety at manageable levels: Administer medication as ordered     Problem: Coping  Goal: Pt/Family able to verbalize concerns and demonstrate effective coping strategies  Description: INTERVENTIONS:  1. Assist patient/family to identify coping skills, available support systems and cultural and spiritual values  2. Provide emotional support, including active listening and acknowledgement of concerns of patient and caregivers  3. Reduce environmental stimuli, as able  4. Instruct patient/family in relaxation techniques, as appropriate  5. Assess for spiritual pain/suffering and initiate Spiritual Care, Psychosocial Clinical Specialist consults as needed  8/9/2024 2135 by Kimberly Henry RN  Outcome: Not Progressing  Flowsheets (Taken 8/9/2024 0930 by Cary Levine RN)  Patient/family able to verbalize anxieties, fears, and concerns, and demonstrate effective coping: Assist patient/family to identify coping skills, available support systems and cultural and spiritual values     
  Problem: Self Harm/Suicidality  Goal: Will have no self-injury during hospital stay  Description: INTERVENTIONS:  1.  Ensure constant observer at bedside with Q15M safety checks  2.  Maintain a safe environment  3.  Secure patient belongings  4.  Ensure family/visitors adhere to safety recommendations  5.  Ensure safety tray has been added to patient's diet order  6.  Every shift and PRN: Re-assess suicidal risk via Frequent Screener    8/11/2024 1441 by Jaz Wilks RN  Outcome: Not Progressing     Problem: Depression/Self Harm  Goal: Effect of psychiatric condition will be minimized and patient will be protected from self harm  Description: INTERVENTIONS:  1. Assess impact of patient's symptoms on level of functioning, self care needs and offer support as indicated  2. Assess patient/family knowledge of depression, impact on illness and need for teaching  3. Provide emotional support, presence and reassurance  4. Assess for possible suicidal thoughts or ideation. If patient expresses suicidal thoughts or statements do not leave alone, initiate Suicide Precautions, move to a room close to the nursing station and obtain sitter  5. Initiate consults as appropriate with Mental Health Professional, Spiritual Care, Psychosocial CNS, and consider a recommendation to the [facility] for a Psychiatric Consultation  Outcome: Not Progressing     
  Problem: Self Harm/Suicidality  Goal: Will have no self-injury during hospital stay  Description: INTERVENTIONS:  1.  Ensure constant observer at bedside with Q15M safety checks  2.  Maintain a safe environment  3.  Secure patient belongings  4.  Ensure family/visitors adhere to safety recommendations  5.  Ensure safety tray has been added to patient's diet order  6.  Every shift and PRN: Re-assess suicidal risk via Frequent Screener    8/8/2024 2054 by Kimberly Henry, RN  Outcome: Not Progressing  Flowsheets (Taken 8/8/2024 1413 by Cary Levine, RN)  Will have no self-injury during hospital stay: Maintain a safe environment  8/8/2024 1409 by Cary Levine, RN  Outcome: Progressing     Problem: Anxiety  Goal: Will report anxiety at manageable levels  Description: INTERVENTIONS:  1. Administer medication as ordered  2. Teach and rehearse alternative coping skills  3. Provide emotional support with 1:1 interaction with staff  Outcome: Not Progressing  Flowsheets (Taken 8/8/2024 1413 by Cary Levine, RN)  Will report anxiety at manageable levels: Administer medication as ordered     
  Problem: Self Harm/Suicidality  Goal: Will have no self-injury during hospital stay  Description: INTERVENTIONS:  1.  Ensure constant observer at bedside with Q15M safety checks  2.  Maintain a safe environment  3.  Secure patient belongings  4.  Ensure family/visitors adhere to safety recommendations  5.  Ensure safety tray has been added to patient's diet order  6.  Every shift and PRN: Re-assess suicidal risk via Frequent Screener    8/9/2024 2135 by Kimberly Henry RN  Outcome: Not Progressing  8/9/2024 0941 by Cary Levine RN  Outcome: Progressing  Flowsheets (Taken 8/9/2024 0930)  Will have no self-injury during hospital stay: Maintain a safe environment     Problem: Anxiety  Goal: Will report anxiety at manageable levels  Description: INTERVENTIONS:  1. Administer medication as ordered  2. Teach and rehearse alternative coping skills  3. Provide emotional support with 1:1 interaction with staff  Outcome: Not Progressing  Flowsheets (Taken 8/9/2024 0930 by Cary Levine RN)  Will report anxiety at manageable levels: Administer medication as ordered     Problem: Coping  Goal: Pt/Family able to verbalize concerns and demonstrate effective coping strategies  Description: INTERVENTIONS:  1. Assist patient/family to identify coping skills, available support systems and cultural and spiritual values  2. Provide emotional support, including active listening and acknowledgement of concerns of patient and caregivers  3. Reduce environmental stimuli, as able  4. Instruct patient/family in relaxation techniques, as appropriate  5. Assess for spiritual pain/suffering and initiate Spiritual Care, Psychosocial Clinical Specialist consults as needed  Outcome: Not Progressing  Flowsheets (Taken 8/9/2024 0930 by Cary Levine RN)  Patient/family able to verbalize anxieties, fears, and concerns, and demonstrate effective coping: Assist patient/family to identify coping skills, available support systems and cultural and 
  Problem: Self Harm/Suicidality  Goal: Will have no self-injury during hospital stay  Description: INTERVENTIONS:  1.  Ensure constant observer at bedside with Q15M safety checks  2.  Maintain a safe environment  3.  Secure patient belongings  4.  Ensure family/visitors adhere to safety recommendations  5.  Ensure safety tray has been added to patient's diet order  6.  Every shift and PRN: Re-assess suicidal risk via Frequent Screener    Outcome: Not Progressing     
  Problem: Self Harm/Suicidality  Goal: Will have no self-injury during hospital stay  Description: INTERVENTIONS:  1.  Ensure constant observer at bedside with Q15M safety checks  2.  Maintain a safe environment  3.  Secure patient belongings  4.  Ensure family/visitors adhere to safety recommendations  5.  Ensure safety tray has been added to patient's diet order  6.  Every shift and PRN: Re-assess suicidal risk via Frequent Screener    Outcome: Progressing     
  Problem: Self Harm/Suicidality  Goal: Will have no self-injury during hospital stay  Description: INTERVENTIONS:  1.  Ensure constant observer at bedside with Q15M safety checks  2.  Maintain a safe environment  3.  Secure patient belongings  4.  Ensure family/visitors adhere to safety recommendations  5.  Ensure safety tray has been added to patient's diet order  6.  Every shift and PRN: Re-assess suicidal risk via Frequent Screener    Outcome: Progressing     Problem: Safety - Adult  Goal: Free from fall injury  Outcome: Progressing     
0730 At this time morning assessment complete. Patient was encountered in her room lying in bed. Luli was cooperative with assessment. Eye contact is noted to be fair, flat affect observed. Patient currently endorsed anxiety and depression, denied SI, HI and A/V hallucinations. C-SSRS level is no risk. Patient is med compliant. There are no untoward side effects from medications to note. Vital signs within normal parameters. Q 15 minute rounds are being completed to assure patient safety and attend to care needs. Will continue to monitor for safety.      Problem: Self Harm/Suicidality  Goal: Will have no self-injury during hospital stay  Description: INTERVENTIONS:  1.  Ensure constant observer at bedside with Q15M safety checks  2.  Maintain a safe environment  3.  Secure patient belongings  4.  Ensure family/visitors adhere to safety recommendations  5.  Ensure safety tray has been added to patient's diet order  6.  Every shift and PRN: Re-assess suicidal risk via Frequent Screener    8/8/2024 1409 by Cary Levine RN  Outcome: Progressing  8/8/2024 0148 by Zeus Crane RN  Outcome: Progressing     Problem: Decision Making  Goal: Pt/Family able to effectively weigh alternatives and participate in decision making related to treatment and care  Description: INTERVENTIONS:  1. Determine when there are differences between patient's view, family's view, and healthcare provider's view of condition  2. Facilitate patient and family articulation of goals for care  3. Help patient and family identify pros/cons of alternative solutions  4. Provide information as requested by patient/family  5. Respect patient/family right to receive or not to receive information  6. Serve as a liaison between patient and family and health care team  7. Initiate Consults from Ethics, Palliative Care or initiate Family Care Conference as is appropriate  8/8/2024 0148 by Zeus Crane, RN  Outcome: Progressing     Problem: 
0730 At this time morning assessment complete. Patient was encountered in her room lying in bed. Luli was cooperative with assessment. Eye contact is noted to be fair, flat affect observed. She currently endorsed anxiety and depression, denied SI, HI and A/V hallucinations. C-SSRS level is no risk. Patient is med compliant. There are no untoward side effects from medications to note. Vital signs within normal parameters. Q 15 minute rounds are being completed to assure patient safety and attend to care needs. Will continue to monitor for safety.     Problem: Depression/Self Harm  Goal: Effect of psychiatric condition will be minimized and patient will be protected from self harm  Description: INTERVENTIONS:  1. Assess impact of patient's symptoms on level of functioning, self care needs and offer support as indicated  2. Assess patient/family knowledge of depression, impact on illness and need for teaching  3. Provide emotional support, presence and reassurance  4. Assess for possible suicidal thoughts or ideation. If patient expresses suicidal thoughts or statements do not leave alone, initiate Suicide Precautions, move to a room close to the nursing station and obtain sitter  5. Initiate consults as appropriate with Mental Health Professional, Spiritual Care, Psychosocial CNS, and consider a recommendation to the [facility] for a Psychiatric Consultation  Recent Flowsheet Documentation  Taken 8/12/2024 0813 by Cary Levine, RN  Effect of psychiatric condition will be minimized and patient will be protected from self harm: Assess patient/family knowledge of depression, impact on illness and need for teaching  8/11/2024 2121 by Kimberly Henry, RN  Outcome: Not Progressing     Problem: Depression/Self Harm  Goal: Effect of psychiatric condition will be minimized and patient will be protected from self harm  Description: INTERVENTIONS:  1. Assess impact of patient's symptoms on level of functioning, self care needs and 
0800 At this time morning assessment complete. Patient was encountered in her room lying in bed. Luli was cooperative with assessment. Eye contact is noted to be fair, flat affect observed. She currently endorsed anxiety and depression, denied SI, HI and A/V hallucinations. C-SSRS level is no risk. Patient is med compliant. There are no untoward side effects from medications to note. Vital signs within normal parameters. Q 15 minute rounds are being completed to assure patient safety and attend to care needs. Will continue to monitor for safety.     Problem: Anxiety  Goal: Will report anxiety at manageable levels  Description: INTERVENTIONS:  1. Administer medication as ordered  2. Teach and rehearse alternative coping skills  3. Provide emotional support with 1:1 interaction with staff  8/8/2024 2054 by Kimberly Henry RN  Outcome: Not Progressing  Flowsheets (Taken 8/8/2024 1413 by Cary Levine RN)  Will report anxiety at manageable levels: Administer medication as ordered     Problem: Self Harm/Suicidality  Goal: Will have no self-injury during hospital stay  Description: INTERVENTIONS:  1.  Ensure constant observer at bedside with Q15M safety checks  2.  Maintain a safe environment  3.  Secure patient belongings  4.  Ensure family/visitors adhere to safety recommendations  5.  Ensure safety tray has been added to patient's diet order  6.  Every shift and PRN: Re-assess suicidal risk via Frequent Screener    8/9/2024 0941 by Cary Levine RN  Outcome: Progressing  8/8/2024 2054 by Kimberly Henry RN  Outcome: Not Progressing  Flowsheets (Taken 8/8/2024 1413 by Cary Levine RN)  Will have no self-injury during hospital stay: Maintain a safe environment     Problem: Anxiety  Goal: Will report anxiety at manageable levels  Description: INTERVENTIONS:  1. Administer medication as ordered  2. Teach and rehearse alternative coping skills  3. Provide emotional support with 1:1 interaction with staff  8/8/2024 
poses a threat to safety call refer to organization policy.  7. Initiate consult with , Psychosocial CNS, Spiritual Care as appropriate  Outcome: Progressing     Problem: Depression/Self Harm  Goal: Effect of psychiatric condition will be minimized and patient will be protected from self harm  Description: INTERVENTIONS:  1. Assess impact of patient's symptoms on level of functioning, self care needs and offer support as indicated  2. Assess patient/family knowledge of depression, impact on illness and need for teaching  3. Provide emotional support, presence and reassurance  4. Assess for possible suicidal thoughts or ideation. If patient expresses suicidal thoughts or statements do not leave alone, initiate Suicide Precautions, move to a room close to the nursing station and obtain sitter  5. Initiate consults as appropriate with Mental Health Professional, Spiritual Care, Psychosocial CNS, and consider a recommendation to the [facility] for a Psychiatric Consultation  Outcome: Progressing     Problem: Spiritual Care  Goal: Pt/Family able to move forward in process of forgiving self, others, and/or higher power  Description: INTERVENTIONS:  1. Assist patient/family to overcome blocks to healing by use of spiritual practices (prayer, meditation, guided imagery, reiki, breath work, etc).  2. De-myth guilt and help patient/family identify possible irrational spiritual/cultural beliefs and values.  3. Explore possibilities of making amends & reconciliation with self, others, and/or a greater power.  4. Guide patient/family in identifying painful feelings of guilt.  5. Help patient/famiy explore and identify spiritual beliefs, cultural understandings or values that may help or hinder letting go of issue.  6. Help patient/family explore feelings of anger, bitterness, resentment.  7. Help patient/family identify and examine the situation in which these feelings are experienced.  8. Help patient/family identify

## 2024-08-14 NOTE — BH NOTE
E/M Psychiatric Progress Note    Patient: Luli Bartlett MRN: [id number]  SSN: [id number]    YOB: 1985  Age: 39 y.o.  Sex: female      Admit Date: 8/7/2024    LOS: 2 days     Chief Complaint: suicidal ideation / detox    Subjective:     HPI / INTERVAL HISTORY:    The patient, Luli Bartlett, is a 39 y.o.  White (non-) female with a past psychiatric history significant for amphetamine and opioid use disorders, who presents at this time with complaints of (and/or evidence of) the following emotional symptoms: suicidal thoughts/threats and anxiety.  Additional symptomatology include mood lability.  The above symptoms have been present for 2+ weeks. These symptoms are of moderate to high severity. These symptoms are constant in nature.  The patient's condition has been precipitated by psychosocial stressors.  Patient's condition made worse by continued psychoactive drug use. UDS: +cocaine, opioids, benzodiazepines negative; BAL=0.     The patient was admitted due to worsening thoughts of self harm, with a plan to shoot herself due to situational stressors, she was living with her father prior to a falling out. Patient reports anxiety and a depressed mood, poor sleep and appetite and relapse on substances. Notably, she denies using cocaine and opioids but acknowledges using methamphetamine over the weekend (this was negative on admission).      The patient is a fair historian. The patient corroborates the above narrative. The patient contracts for safety on the unit and gives consent for the team to contact collateral. The patient is amenable to initiating treatment while on the unit.    08/09 - the patient has been isolative, anxious and with no change in her affect. She denies active thoughts of self harm and is able to make her needs known. No episodes of agitation or aggression have occurred, and she is eager for rehab placement. Patient slept 7.5 hours overnight and is agreeable with 
    E/M Psychiatric Progress Note    Patient: Luli Bartlett MRN: [id number]  SSN: [id number]    YOB: 1985  Age: 39 y.o.  Sex: female      Admit Date: 8/7/2024    LOS: 4 days     Chief Complaint: suicidal ideation / detox    Subjective:     HPI / INTERVAL HISTORY:    The patient, Luli Bartlett, is a 39 y.o.  White (non-) female with a past psychiatric history significant for amphetamine and opioid use disorders, who presents at this time with complaints of (and/or evidence of) the following emotional symptoms: suicidal thoughts/threats and anxiety.  Additional symptomatology include mood lability.  The above symptoms have been present for 2+ weeks. These symptoms are of moderate to high severity. These symptoms are constant in nature.  The patient's condition has been precipitated by psychosocial stressors.  Patient's condition made worse by continued psychoactive drug use. UDS: +cocaine, opioids, benzodiazepines negative; BAL=0.     The patient was admitted due to worsening thoughts of self harm, with a plan to shoot herself due to situational stressors, she was living with her father prior to a falling out. Patient reports anxiety and a depressed mood, poor sleep and appetite and relapse on substances. Notably, she denies using cocaine and opioids but acknowledges using methamphetamine over the weekend (this was negative on admission).      The patient is a fair historian. The patient corroborates the above narrative. The patient contracts for safety on the unit and gives consent for the team to contact collateral. The patient is amenable to initiating treatment while on the unit.    08/09 - the patient has been isolative, anxious and with no change in her affect. She denies active thoughts of self harm and is able to make her needs known. No episodes of agitation or aggression have occurred, and she is eager for rehab placement. Patient slept 7.5 hours overnight and is agreeable with 
    E/M Psychiatric Progress Note    Patient: Luli Bartlett MRN: [id number]  SSN: [id number]    YOB: 1985  Age: 39 y.o.  Sex: female      Admit Date: 8/7/2024    LOS: 5 days     Chief Complaint: suicidal ideation / detox    Subjective:     HPI / INTERVAL HISTORY:    The patient, Luli Bartlett, is a 39 y.o.  White (non-) female with a past psychiatric history significant for amphetamine and opioid use disorders, who presents at this time with complaints of (and/or evidence of) the following emotional symptoms: suicidal thoughts/threats and anxiety.  Additional symptomatology include mood lability.  The above symptoms have been present for 2+ weeks. These symptoms are of moderate to high severity. These symptoms are constant in nature.  The patient's condition has been precipitated by psychosocial stressors.  Patient's condition made worse by continued psychoactive drug use. UDS: +cocaine, opioids, benzodiazepines negative; BAL=0.     The patient was admitted due to worsening thoughts of self harm, with a plan to shoot herself due to situational stressors, she was living with her father prior to a falling out. Patient reports anxiety and a depressed mood, poor sleep and appetite and relapse on substances. Notably, she denies using cocaine and opioids but acknowledges using methamphetamine over the weekend (this was negative on admission).      The patient is a fair historian. The patient corroborates the above narrative. The patient contracts for safety on the unit and gives consent for the team to contact collateral. The patient is amenable to initiating treatment while on the unit.    08/09 - the patient has been isolative, anxious and with no change in her affect. She denies active thoughts of self harm and is able to make her needs known. No episodes of agitation or aggression have occurred, and she is eager for rehab placement. Patient slept 7.5 hours overnight and is agreeable with 
   Gerry Black #[id number] (CSN:[id number]) (:1985 39 y.o. F) (Adm: 24)  [id number]  PCP    TASNEEM OTERO  Demographics  CommentAddress   [address] [address]    Home Phone   [phone]    Work Phone       Mobile Phone   [phone]             Social Security Number       Insurance Information   [employer] [place]    Marital Status       Zoroastrianism   None               Insurance Payors as of 2024    [employer] [place]    Plan: [employer] [place] Member: [id number] Effective from: 2023   Subscriber: GERRY BLACK Subscriber ID: [id number] Guarantor: GERRY BLACK     Documents Filed to Patient    Power of  Living Will Clinical Unknown Study Attachment Consent Form ABN Waiver After Visit Summary Lab Result Scan Code Status MyChart Status Advance Care Planning    Not on File  Not on File  Not on File  Not on File  Not on File  Not on File  Filed  Not on File  FULL [Updated on 24 2350]  Active Jump to the Activity      Auth/Cert Information    Open auth/cert linked to hospital account [id number]      Patient Contacts    Name Relation Home Work Mobile   Jesus Black Spouse [phone]   Marlen Oliva Child [phone]  [phone]   Karissa Oliva Child [phone]     Admission Information    Current Information    Attending Provider Admitting Provider Admission Type Admission Status   Emile Carrera MD Marshall, Phillip B, MD Elective Confirmed Admission          Admission Date/Time Discharge Date Hospital Service Auth/Cert Status   24  2152  Behavioral Incomplete          Hospital Area Unit Room/Bed    [facility] 3 ACUTE BEHAV [facility] 309/01              Hospital Account    Name Acct ID Class Status Primary Coverage   Gerry Black [id number] BEHAVIOR IP Open [employer] [place] [employer] [place]            Guarantor 
1000 At this time atarax 50 mg was given PO for anxiety.  
1115 At this time Atarax 50 mg was given PO for anxiety.   
1905 At this time Atarax 50 mg was given PO for anxiety.   
Behavioral Health Interdisciplinary Rounds     Patient Name: Luli Bartlett Age: 39 y.o. Room/Bed:  309/01  Primary Diagnosis: Adjustment disorder with mixed anxiety and depressed mood  Admission Status: Voluntary     Readmission within 30 days: No  Power of  in place: No  Patient requires a blocked bed: No          Reason for blocked bed: n/a    Sleep hours: 7.5       Participation in Care/Groups:  No  Medication Compliant?: Yes  PRNS (last 24 hours): Anticholinergic and Sleep Aid    Restraints (last 24 hours):  No  Substance Abuse:  Yes    24 hour chart check complete: Yes    Patient goal(s) for today: Take medications as prescribed, , engage in unit activities, participate in hygiene/ADLs, and communicate needs to appropriate staff,   Treatment team focus/goals: MD adjust medications as appropriate, identify discharge planning needs, coordination of care,     Progress note:   Pt met with treatment team. Pt is calm and cooperative though isolative. Pt denies HI/AVH and contracts for safety. Pt presents with depressed mood and flat affect. Pt reports she is eating and sleeping fairly. Pt reports she is feeling \"not too good\". Pt endorses anxiety and depression. Discussed how pt may be experiencing an increase in depressive symptoms due to combination of stressors and substance withdrawal.     Pt reports she is still motivated to attend substance use treatment.       SW to fax clinicals to [facility].     LOS:  2  Expected LOS: 6    Insurance coverage: Aetna Medicaid  Family contact:        Family requesting physician contact today:  No  Discharge plan: Residential Treatment Facility.   Access to weapons: No       Outpatient provider(s): to be linked  Patient's preferred phone number for follow up call: [phone]    Participating treatment team members: Luli Bartlett, BRIAN Martinez, Emile Carrera MD  
Behavioral Health Interdisciplinary Rounds     Patient Name: Luli Bartlett Age: 39 y.o. Room/Bed:  320/01  Primary Diagnosis: Adjustment disorder with mixed anxiety and depressed mood  Admission Status: Voluntary     Readmission within 30 days: No  Power of  in place: No  Patient requires a blocked bed: No          Reason for blocked bed: n/a    Sleep hours: 7       Participation in Care/Groups:  Yes  Medication Compliant?: Yes  PRNS (last 24 hours): Anticholinergic and Sleep Aid    Restraints (last 24 hours):  No  Substance Abuse:  Yes    24 hour chart check complete: Yes    Patient goal(s) for today: Take medications as prescribed, , engage in unit activities, participate in hygiene/ADLs, and communicate needs to appropriate staff,   Treatment team focus/goals: MD adjust medications as appropriate, identify discharge planning needs, coordination of care,     Progress note:   Pt met with treatment team. Pt is aox4. Pt denies SI/HI/AVH. Pt presents with anxious mood and congruent affect. Pt endorses depression but reports it has improved since she got here. Pt reports she got poor sleep due to racing thoughts. Pt thought process is future oriented. Pt thought content is WNL. Pt is able to make her needs known and is good behavioral control. Pt remains motivated to attend substance use treatment. ADLs appear fair. Speech is soft. Eye contact is good.       SW spoke to Ileana Odonnell at [facility], pt can discharge tomorrow.     LOS:  6  Expected LOS: 7    Insurance coverage: A[employer]na Medicaid  Family contact:                                   Family requesting physician contact today:  No  Discharge plan: Residential Treatment Facility.   Access to weapons: No                                                         Outpatient provider(s): to be linked  Patient's preferred phone number for follow up call: [phone]    Participating treatment team members: Chiquita Gaxiola MSW, Emile Carrera, 
Behavioral Health Interdisciplinary Rounds     Patient Name: Luli Bartlett Age: 39 y.o. Room/Bed:  320/01  Primary Diagnosis: Adjustment disorder with mixed anxiety and depressed mood  Admission Status: Voluntary     Readmission within 30 days: No  Power of  in place: No  Patient requires a blocked bed: No          Reason for blocked bed: n/a    Sleep hours: 8       Participation in Care/Groups:    Medication Compliant?: Yes  PRNS (last 24 hours): Sleep Aid    Restraints (last 24 hours):  No  Substance Abuse:  Yes    24 hour chart check complete: Yes    Patient goal(s) for today: Take medications as prescribed, , engage in unit activities, participate in hygiene/ADLs, and communicate needs to appropriate staff,   Treatment team focus/goals: MD adjust medications as appropriate, identify discharge planning needs, coordination of care,     Progress note:   Pt met with treatment team. Pt is aox4. Pt endorses anxiety and depression. Pt presents with depressed mood and flat affect. Pt reports she feels \"alright\". Pt thought process remains future oriented. Pt thought content presents WNL. ADLs appear fair. Pt eye contact is good. Pt speech presents WNL.    Pt remains motivated to attend substance use treatment.       SW to follow up with [facility].    1210: SW left  for Ileana Odonnell, admissions coordinator, requesting call back     1445: Spoke to Ms. Odonnell from [facility], earliest discharge would be Wednesday. Provided phone number to pt to do pre admission screening     LOS:  5  Expected LOS: 7    Insurance coverage: Aetna Medicaid  Family contact:                                   Family requesting physician contact today:  No  Discharge plan: Residential Treatment Facility.   Access to weapons: No                                                         Outpatient provider(s): to be linked  Patient's preferred phone number for follow up call: [phone]    Participating treatment team members: Luli Bartlett 
Behavioral Health Transition Record    Patient Name: Luli Bartlett  YOB: 1985   Medical Record Number: [id number]  Date of Admission: 8/7/2024  9:52 PM   Date of Discharge: 08/14/24      Attending Provider: No att. providers found   Discharging Provider:   Emile Carrera MD   To contact this individual call  and ask the  to page.  If unavailable, ask to be transferred to Behavioral Health Provider on call.  A Behavioral Health Provider will be available on call 24/7 and during holidays.    Primary Care Provider: Erica Persaud APRN - NP    Allergies   Allergen Reactions    Nsaids      Acid reflux       Reason for Admission: The patient, Luli Bartlett, is a 39 y.o.  White (non-) female with a past psychiatric history significant for amphetamine and opioid use disorders, who presents at this time with complaints of (and/or evidence of) the following emotional symptoms: suicidal thoughts/threats and anxiety.  Additional symptomatology include mood lability.  The above symptoms have been present for 2+ weeks. These symptoms are of moderate to high severity. These symptoms are constant in nature.  The patient's condition has been precipitated by psychosocial stressors.  Patient's condition made worse by continued psychoactive drug use. UDS: +cocaine, opioids, benzodiazepines negative; BAL=0.     The patient was admitted due to worsening thoughts of self harm, with a plan to shoot herself due to situational stressors, she was living with her father prior to a falling out. Patient reports anxiety and a depressed mood, poor sleep and appetite and relapse on substances. Notably, she denies using cocaine and opioids but acknowledges using methamphetamine over the weekend (this was negative on admission).     Admission Diagnosis: Unspecified mood (affective) disorder [F39]  Unspecified mood (affective) disorder (HCC) [F39]    * No surgery found *    Results for orders 
Day Shift Assessment ([id number]):    Writer met patient in her room. Patient noted to be sitting upright in bed, awake, and appears to be free if distress. Patient calm and cooperative with vital signs mando assessment. Patient is A/O x 4 with clear speech, steady ambulation, is independent of ADLs, and is meal and medication complaint. Patient is discharge focused and frequently asking for updates on discharge time. Patient presents with a flat affect, depressed mood and high anxiety. Patient reports depression and anxiety each 10/10. Patient denies SI, HI, and AVH. Patient scored \"No Risk\" on C-SSRS for this shift. Patient is visible in the milieu. Plan of care in place with discharge scheduled for this date. Q 15 minute checks in place for safety   
Discharge:    Patient is A/O x 4 with clear speech, linear thought process and is independent of ADLs. Patient denies SI and HI. Patient scored \"No Risk\" on C-SSRS for this shift. Patient is discharging to [facility] to continue treatment in a lesser restrictive environment. After Visit Summary (AVS), including safety plan, prescriptions, and follow up instructions reviewed with patient prior to discharge. Opportunity for questions provided. Patient verbalized understanding and AVS provided to patient at discharge. Patient valuables obtained from security and returned to patient at discharge. Patient valuables returned to patient at discharge. Yellow, patient belonging sheet reviewed with and signed by patient. Patient reported that she was missing a brown purse. No purse listed on yellow belongings sheet on in security. Writer reviewed yellow sheet with patient and patient showing no purse listed. Patient stated that she last remembered having it in the emergency department. Writer spoke with ED charge who looked in the ED and in their locker. No purse located. Patient informed.  Patient verbalized understanding. Patient escorted off unit by  and KARLA Woo to meet OTIS for transport to [facility].   
Evening assessment complete. Patient was encountered in her room. VS are stable.   She is calm and cooperative. Patient reports that they are currently experiencing ruminating thoughts. Concerns about stressful events in her life. Eye contact is noted to be fair. Mood is noted to be depressed and anxious. Affect is blunt. Patient currently rates anxiety and depression 10/10, endorses passive SI but denies HI and A/VH. C-SSRS level is low risk.  Patient contracts for safety and educated on advising nursing staff if symptoms escalate.     Patient has remained in her room for the evening.     Patient is med compliant, and requested Trazodone for sleeping. There are no untoward side effects from medications to note.       Hourly rounds are being completed to assure patient safety and attend to care needs. Monitoring will continue for changes in patient condition       SLEEP HOURS-7      
Luli is alert, anxious and depressed mood (My anxiety and depression is 10/10), cooperative. She denies HI/pain. Endorsed passive SI but told writer \"it comes and goes\". She is medication and meal compliant. No distress observed. No other concerns expressed at this time. Q 15 minute checks ongoing to ensure patient safety.  
Patient assessed in her bedroom.  She presents as calm and cooperative.Patient endorses SI thoughts with no plan. She's able to contract for safety.  She denies HI/AVH.  Patient rates her depression 10/10 and anxiety 8/10.  Patient is meal and medication compliant. She's been isolative to her room and out for meals.  Will continue to monitor q15min for safety.  
Patient evaluated in bedroom. She presents sad and depressed with flat affect.  She endorses SI with no plan but can contract for safety.  Patient isolates to her bedroom and comes out for meals only.  Patient is medication and meal compliant. Patient rates anxiety as \"high\" and depression 10/10.  Administered Hydroxyzine 50mg. For anxiety.  Will continue to monitor q15min for safety. No behavioral concerns during shift. Will continue to monitor q15min for safety.  
Patient is isolative to her room, calm and cooperative. Affect is flat,mood is depressed, eye contact is fair. Patient endorsed anxiety of 5/10, depression of 9/10, denied SI, HI, and AVH. No signs of distress observed. PRN atarax administered. Patient is compliant with meds and meals. No aggressive behavior noted. Emotional support and encouragement provided. Q 15 minutes and hourly rounds in progress. Pt slept for the total of 7.5 hours.  
Patient is isolative, calm and cooperative. Affect is flat, mood is depressed.  Patient endorsed SI with no plan, anxiety of 10/10, depression of 7/10, denied  HI, and AVH. She contracted for safety. No signs of distress observed. No PRN given.  Meds and meals compliant. No aggressive behavior noted. Emotional support and encouragement provided. Q 15 minutes and hourly rounds in progress. Pt slept for the total of 6.5 hours.   
Patient remained isolative, calm and cooperative. Affect is flat, mood is depressed.  Patient endorsed SI with no plan, anxiety of 7/10, depression of 6/10, denied  HI, and AVH. She contracted for safety. No signs of distress observed. PRN atarax administered.  Meds and meals compliant. No aggressive behavior noted. Emotional support and encouragement provided. Q 15 minutes and hourly rounds in progress. Pt slept for the total of 8 hours.   
Patient remained unchanged, isolative, calm and cooperative. Affect is flat, mood is depressed. Patient endorsed SI with no plan, anxiety of 10/10, and depression of 8/10. She denied  HI, and AVH. C-SSR is low risk. Patient contracted for safety and accepts to adhere to safety recommendations. PRN trazodone given. Meds and meals compliant. No aggressive behavior noted. Emotional support and reassurance provided. Q 15 minutes and hourly rounds in progress. Pt slept for the total of 8 hours.   
     Payor Plan Address Payor Plan Phone Number Payor Plan Fax Number Effective Dates    [address] [address]   7/1/2023 - None Entered    [place] [address]         Subscriber Name Subscriber Birth Date Member ID       GERRY BLACK 1985 [id number]                     Collateral information:     Current psychiatric /substance abuse providers and contact info: [facility]    Previous psychiatric/substance abuse providers and response to treatment: July 2023    Family history of mental illness or substance abuse: not reported    Substance abuse history:  The patient currently is  using substances. The patient reports using Cocaine.    Social History     Tobacco Use    Smoking status: Unknown    Smokeless tobacco: Not on file   Substance Use Topics    Alcohol use: Not Currently         History of biomedical complications associated with substance abuse: cravings.    Patient's current acceptance of treatment or motivation for change: fair    Family constellation: The patient is . The patient has 2 non group home children.    Is significant other involved? No    Describe support system: pt reports she does not have one    Describe living arrangements and home environment: The patient lives alone.  Address on file:  [address] [place] [id number]    GUARDIAN/POA: No    Guardian Name: n/a    Guardian Contact: n/a    Health issues:   Past Medical History:   Diagnosis Date    Acute pulmonary embolism (HCC) 05/09/2022    Iron deficiency anemia 08/22/2022    Opioid use disorder, mild, abuse (HCC) 04/24/2023    Polysubstance abuse (HCC) 07/24/2023 7.2023: per pt clean since 4/2023, getting ready to start outpatient program    Psychiatric disorder     anxiety    Psychiatric disorder     depression    Psychosis (HCC) 04/21/2023       Trauma history:     Legal issues: none reported     History of  service: No    Financial status: none reported    Hinduism/cultural factors: not applicable 
treatment plan. She is tolerating benzodiazepine taper without incident thus far.    8/10 - the patient is isolative to her room. She reports passive SI with no plan. She is agreeable to come to staff. Rates her depression 9/10 and anxiety 9/10. Compliant with medications and denies side effects.     8/11- the patient continues to isolate to her room. She rates her depression 9/10 and anxiety 10/10. She is worrying about her children. She wants to go to rehab. Denies SI/HI/AVH. She is compliant with medications and denies side effects.     08/12 - overnight, the patient remained in fair behavioral control. She slept 8 hours and was eager for rehab placement. SW coordinating disposition with facility. She denies SI/HI/AVH/PI and is able to make her needs known. Patient got Trazodone PRN. Counseled on her elevated LDLs.     08/13 - the patient is unchanged. She is medication compliant, anxious but in fair behavioral control. Patient denies active thoughts of self harm and is able to make her needs known. She denies SI/HI/AVH/PI. Patient discharge focused, she will be going to rehab tomorrow and SW coordinating with facility on disposition. She is in fair behavioral control and slept 7 hours overnight.       Objective:     VITAL SIGNS:  Vitals:    08/11/24 2130 08/12/24 0813 08/12/24 2015 08/13/24 0810   BP: 118/77 125/87 (!) 131/97 114/60   Pulse: 88 84 83 57   Resp: 16 16 18 16   Temp: 98.1 °F (36.7 °C) 97.9 °F (36.6 °C) 98.5 °F (36.9 °C) 97.8 °F (36.6 °C)   TempSrc: Oral Oral Oral Oral   SpO2: 98% 100% 97% 97%   Weight:       Height:           MENTAL STATUS EXAMINATION: MSE findings are within normal limits (WNL) unless otherwise stated below. (all of the below categories of the MSE have been reviewed (reviewed 8/13/2024) and updated as deemed appropriate)    General Presentation age appropriate, cooperative and guarded   Orientation oriented to time, place and person   Language No aphasia or dysarthria   Speech 
22.6  MG/DL Final    Chol/HDL Ratio 08/09/2024 5.2 (H)  0.0 - 5.0   Final    Hemoglobin A1C 08/09/2024 5.2  4.0 - 5.6 % Final    Estimated Avg Glucose 08/09/2024 103  mg/dL Final         Assessment & Plan     The patient, Luli Bartlett, is a 39 y.o.  female who presents at this time for treatment of the following diagnoses:  Adjustment disorder with mixed anxiety and depressed mood  ASSESSMENT: the patient presents with SI in the setting of stimulant and opioid use, situational stressors. Patient is future oriented with a plan to attend rehab. She has been doing poorly in the community due to interpersonal stressors and the typical psychosocial sequelae of polysubstance use. Will start tapering benzodiazepine, work on a safe disposition plan to a rehab facility.  PLAN:  - Observe off substances  - CONTINUE Klonopin 0.5 mg BID for anxiety  - CONTINUE Gabapentin 400 TID for chronic pain  - CONTINUE Zoloft 200 mg QDAY for MDD  - CONTINUE Geodon 40 mg BIDAC for mood adjunct  - IGM therapy as tolerated  - Expand database / obtain collateral  - Dispo planning (rehab when stable)    I certify that this patient's inpatient psychiatric hospital services furnished since the previous certification were, and continue to be,required for treatment that could reasonably be expected to improve the patient's condition, or for diagnostic study, and that the patient continues to need, on a daily basis, active treatment furnished directly by or requiring the supervision of inpatient psychiatric facility personnel. In addition, the hospital records show that services furnished were intensive treatment services, admission or related services, or equivalent services.    Signed By: IHSAN Gil - CNP     August 10, 2024

## 2024-08-14 NOTE — PROGRESS NOTES
Pt. Alert and oriented x4, visible on the unit during medication/snack time only otherwise isolative to self in room lying in bed. Reports anxiety and depression both 10/10, pain 2/10. Pt calm and cooperative with flat affect and depressed mood. Denies HI/AVH, but endorses passive SI with no plan. Compliant with scheduled medication with no PRN meds given.     0630 - Pt appeared to have slept approximately 8.5 hours during the night.  No acute distress observed.  Staff will continue hourly rounds and Q15 minutes checks maintained during shift for care and safety.

## 2024-08-15 NOTE — DISCHARGE SUMMARY
PSYCHIATRIC DISCHARGE SUMMARY         IDENTIFICATION:    Patient Name  Luli Bartlett   Date of Birth 1985   [facility] [id number]   Medical Record Number  [id number]      Age  39 y.o.   PCP Erica Persaud APRN - NP   Admit date:  8/7/2024    Discharge date: 8/14/2024   Room Number  320/01  @ [facility]   Date of Service  8/14/2024            TYPE OF DISCHARGE: REGULAR               CONDITION AT DISCHARGE: Independent and Stable       PROVISIONAL & DISCHARGE DIAGNOSES:        Active Hospital Problems    Cocaine use disorder, mild, abuse (HCC)      *Adjustment disorder with mixed anxiety and depressed mood      Opioid use disorder, mild, abuse (HCC)        DISCHARGE DIAGNOSIS:   Axis I:  SEE ABOVE  Axis II: SEE ABOVE  Axis III: SEE ABOVE  Axis IV:  lack of structure  Axis V:  30 on admission, 70 on discharge     HISTORY OF PRESENT ILLNESS:  \"Suicidal ideation / detox\"    The patient, Luli Bartlett, is a 39 y.o.  White (non-) female with a past psychiatric history significant for amphetamine and opioid use disorders, who presents at this time with complaints of (and/or evidence of) the following emotional symptoms: suicidal thoughts/threats and anxiety.  Additional symptomatology include mood lability.  The above symptoms have been present for 2+ weeks. These symptoms are of moderate to high severity. These symptoms are constant in nature.  The patient's condition has been precipitated by psychosocial stressors.  Patient's condition made worse by continued psychoactive drug use. UDS: +cocaine, opioids, benzodiazepines negative; BAL=0.     The patient was admitted due to worsening thoughts of self harm, with a plan to shoot herself due to situational stressors, she was living with her father prior to a falling out. Patient reports anxiety and a depressed mood, poor sleep and appetite and relapse on substances. Notably, she denies using cocaine and opioids but acknowledges using

## 2024-09-27 ENCOUNTER — HOSPITAL ENCOUNTER (EMERGENCY)
Facility: HOSPITAL | Age: 39
Discharge: LEFT BEFORE DISCHARGE | End: 2024-09-27

## 2024-09-27 VITALS
HEIGHT: 62 IN | TEMPERATURE: 97.4 F | DIASTOLIC BLOOD PRESSURE: 109 MMHG | BODY MASS INDEX: 41.77 KG/M2 | OXYGEN SATURATION: 98 % | SYSTOLIC BLOOD PRESSURE: 132 MMHG | RESPIRATION RATE: 17 BRPM | HEART RATE: 92 BPM

## 2024-09-27 DIAGNOSIS — F43.23 ADJUSTMENT DISORDER WITH MIXED ANXIETY AND DEPRESSED MOOD: ICD-10-CM

## 2024-09-27 RX ORDER — GABAPENTIN 400 MG/1
400 CAPSULE ORAL 3 TIMES DAILY
Qty: 90 CAPSULE | Refills: 1 | OUTPATIENT
Start: 2024-09-27 | End: 2024-11-26

## 2024-09-27 ASSESSMENT — PAIN - FUNCTIONAL ASSESSMENT: PAIN_FUNCTIONAL_ASSESSMENT: NONE - DENIES PAIN

## 2024-09-27 NOTE — ED TRIAGE NOTES
Pt arrives ambulatory.  In triage, pt is having disorganized thoughts.  States she is stressed, asks if she can make an kimberly for Monday.  Then she states that she felt sick and thought people were trying to give her covid.  Then she states her legs are swollen. Denies drugs, states she wants to be seen for her stress. Pt denies SI/HI
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
## 2024-09-27 NOTE — PROGRESS NOTES
Spiritual Care Partner Volunteer visited patient at [facility] in [facility] EMERGENCY DEPT on 9/27/2024   Documented by:  [facility] Vel[facility]co [facility]  To contact the  call: [phone] ([phone])

## 2024-10-10 ENCOUNTER — TELEPHONE (OUTPATIENT)
Facility: CLINIC | Age: 39
End: 2024-10-10

## 2024-10-10 NOTE — TELEPHONE ENCOUNTER
Patient called states she believe she have a UTI, patient asked to go to ED or UC for evaluation, and schedule a follow up with MICHELLE Maldonado.

## 2024-11-15 RX ORDER — ONDANSETRON 4 MG/1
4 TABLET, FILM COATED ORAL EVERY 8 HOURS PRN
Qty: 20 TABLET | Refills: 0 | Status: SHIPPED | OUTPATIENT
Start: 2024-11-15

## 2024-12-16 ENCOUNTER — TELEPHONE (OUTPATIENT)
Facility: CLINIC | Age: 39
End: 2024-12-16

## 2024-12-16 DIAGNOSIS — F43.23 ADJUSTMENT DISORDER WITH MIXED ANXIETY AND DEPRESSED MOOD: ICD-10-CM

## 2024-12-16 RX ORDER — GABAPENTIN 400 MG/1
400 CAPSULE ORAL 3 TIMES DAILY
Qty: 90 CAPSULE | Refills: 1 | Status: CANCELLED | OUTPATIENT
Start: 2024-12-16 | End: 2025-02-14

## 2024-12-17 DIAGNOSIS — F43.23 ADJUSTMENT DISORDER WITH MIXED ANXIETY AND DEPRESSED MOOD: ICD-10-CM

## 2024-12-17 RX ORDER — GABAPENTIN 400 MG/1
400 CAPSULE ORAL 3 TIMES DAILY
Qty: 90 CAPSULE | Refills: 1 | Status: SHIPPED | OUTPATIENT
Start: 2024-12-17 | End: 2025-02-15

## 2025-01-07 ENCOUNTER — TELEPHONE (OUTPATIENT)
Facility: CLINIC | Age: 40
End: 2025-01-07

## 2025-02-13 DIAGNOSIS — F43.23 ADJUSTMENT DISORDER WITH MIXED ANXIETY AND DEPRESSED MOOD: ICD-10-CM

## 2025-02-13 RX ORDER — GABAPENTIN 400 MG/1
400 CAPSULE ORAL 3 TIMES DAILY
Qty: 90 CAPSULE | Refills: 1 | Status: SHIPPED | OUTPATIENT
Start: 2025-02-13 | End: 2025-05-14

## 2025-02-13 NOTE — TELEPHONE ENCOUNTER
Last appointment: 05/14/2024 MICHELLE Persaud   Next appointment: 02/14/2025 MICHELLE Persaud   Previous refill encounter(s):   12/17/2024 Neurontin #90 with 1 refill (1 tid)     No access to PDMP     For Pharmacy Admin Tracking Only    Program: Medication Refill  Intervention Detail: New Rx: 1, reason: Patient Preference  Time Spent (min): 5    Requested Prescriptions     Pending Prescriptions Disp Refills    gabapentin (NEURONTIN) 400 MG capsule [Pharmacy Med Name: GABAPENTIN 400 MG CAPSULE] 90 capsule 0     Sig: Take 1 capsule by mouth 3 times daily.

## 2025-04-14 DIAGNOSIS — F43.23 ADJUSTMENT DISORDER WITH MIXED ANXIETY AND DEPRESSED MOOD: ICD-10-CM

## 2025-04-15 RX ORDER — GABAPENTIN 400 MG/1
CAPSULE ORAL
Qty: 90 CAPSULE | Refills: 1 | Status: SHIPPED | OUTPATIENT
Start: 2025-04-15 | End: 2026-04-15

## 2025-04-29 ENCOUNTER — TELEPHONE (OUTPATIENT)
Facility: CLINIC | Age: 40
End: 2025-04-29
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
## 2025-06-13 ENCOUNTER — TELEPHONE (OUTPATIENT)
Facility: CLINIC | Age: 40
End: 2025-06-13

## 2025-06-13 DIAGNOSIS — F43.23 ADJUSTMENT DISORDER WITH MIXED ANXIETY AND DEPRESSED MOOD: ICD-10-CM

## 2025-06-14 DIAGNOSIS — F43.23 ADJUSTMENT DISORDER WITH MIXED ANXIETY AND DEPRESSED MOOD: ICD-10-CM

## 2025-06-14 RX ORDER — GABAPENTIN 400 MG/1
CAPSULE ORAL
Qty: 90 CAPSULE | Refills: 1 | Status: CANCELLED | OUTPATIENT
Start: 2025-06-14 | End: 2026-06-14

## 2025-06-14 RX ORDER — ONDANSETRON 4 MG/1
4 TABLET, FILM COATED ORAL EVERY 8 HOURS PRN
Qty: 20 TABLET | Refills: 0 | Status: CANCELLED | OUTPATIENT
Start: 2025-06-14

## 2025-06-16 RX ORDER — GABAPENTIN 400 MG/1
400 CAPSULE ORAL 3 TIMES DAILY
Qty: 90 CAPSULE | Refills: 1 | Status: SHIPPED | OUTPATIENT
Start: 2025-06-16 | End: 2026-06-16

## 2025-06-18 RX ORDER — GABAPENTIN 400 MG/1
400 CAPSULE ORAL 3 TIMES DAILY
Qty: 90 CAPSULE | OUTPATIENT
Start: 2025-06-18

## 2025-07-14 ENCOUNTER — OFFICE VISIT (OUTPATIENT)
Facility: CLINIC | Age: 40
End: 2025-07-14
Payer: MEDICAID

## 2025-07-14 VITALS
WEIGHT: 248.3 LBS | SYSTOLIC BLOOD PRESSURE: 107 MMHG | DIASTOLIC BLOOD PRESSURE: 65 MMHG | OXYGEN SATURATION: 98 % | BODY MASS INDEX: 45.69 KG/M2 | HEART RATE: 97 BPM | TEMPERATURE: 97.1 F | RESPIRATION RATE: 18 BRPM | HEIGHT: 62 IN

## 2025-07-14 DIAGNOSIS — M79.602 LEFT ARM PAIN: ICD-10-CM

## 2025-07-14 DIAGNOSIS — F19.90 SUBSTANCE USE DISORDER: ICD-10-CM

## 2025-07-14 DIAGNOSIS — N81.10 VAGINAL PROLAPSE: ICD-10-CM

## 2025-07-14 DIAGNOSIS — R79.89 ELEVATED SERUM CREATININE: ICD-10-CM

## 2025-07-14 DIAGNOSIS — N76.0 ACUTE VAGINITIS: ICD-10-CM

## 2025-07-14 DIAGNOSIS — N76.0 ACUTE VAGINITIS: Primary | ICD-10-CM

## 2025-07-14 DIAGNOSIS — F43.23 ADJUSTMENT DISORDER WITH MIXED ANXIETY AND DEPRESSED MOOD: ICD-10-CM

## 2025-07-14 DIAGNOSIS — Z11.3 SCREENING EXAMINATION FOR STD (SEXUALLY TRANSMITTED DISEASE): ICD-10-CM

## 2025-07-14 DIAGNOSIS — Z12.31 SCREENING MAMMOGRAM FOR BREAST CANCER: ICD-10-CM

## 2025-07-14 LAB
BILIRUBIN, URINE, POC: NORMAL
BLOOD URINE, POC: NEGATIVE
GLUCOSE URINE, POC: NEGATIVE
KETONES, URINE, POC: NEGATIVE
LEUKOCYTE ESTERASE, URINE, POC: NEGATIVE
NITRITE, URINE, POC: NEGATIVE
PH, URINE, POC: 6 (ref 4.6–8)
PROTEIN,URINE, POC: 30
SPECIFIC GRAVITY, URINE, POC: 1.02 (ref 1–1.03)
URINALYSIS CLARITY, POC: CLEAR
URINALYSIS COLOR, POC: YELLOW
UROBILINOGEN, POC: NORMAL

## 2025-07-14 PROCEDURE — 99214 OFFICE O/P EST MOD 30 MIN: CPT | Performed by: NURSE PRACTITIONER

## 2025-07-14 PROCEDURE — 81003 URINALYSIS AUTO W/O SCOPE: CPT | Performed by: NURSE PRACTITIONER

## 2025-07-14 SDOH — ECONOMIC STABILITY: FOOD INSECURITY: WITHIN THE PAST 12 MONTHS, THE FOOD YOU BOUGHT JUST DIDN'T LAST AND YOU DIDN'T HAVE MONEY TO GET MORE.: NEVER TRUE

## 2025-07-14 SDOH — ECONOMIC STABILITY: FOOD INSECURITY: WITHIN THE PAST 12 MONTHS, YOU WORRIED THAT YOUR FOOD WOULD RUN OUT BEFORE YOU GOT MONEY TO BUY MORE.: NEVER TRUE

## 2025-07-14 ASSESSMENT — PATIENT HEALTH QUESTIONNAIRE - PHQ9
8. MOVING OR SPEAKING SO SLOWLY THAT OTHER PEOPLE COULD HAVE NOTICED. OR THE OPPOSITE, BEING SO FIGETY OR RESTLESS THAT YOU HAVE BEEN MOVING AROUND A LOT MORE THAN USUAL: NOT AT ALL
5. POOR APPETITE OR OVEREATING: NOT AT ALL
SUM OF ALL RESPONSES TO PHQ QUESTIONS 1-9: 7
1. LITTLE INTEREST OR PLEASURE IN DOING THINGS: NOT AT ALL
SUM OF ALL RESPONSES TO PHQ QUESTIONS 1-9: 7
6. FEELING BAD ABOUT YOURSELF - OR THAT YOU ARE A FAILURE OR HAVE LET YOURSELF OR YOUR FAMILY DOWN: SEVERAL DAYS
SUM OF ALL RESPONSES TO PHQ QUESTIONS 1-9: 6
9. THOUGHTS THAT YOU WOULD BE BETTER OFF DEAD, OR OF HURTING YOURSELF: SEVERAL DAYS
3. TROUBLE FALLING OR STAYING ASLEEP: NEARLY EVERY DAY
SUM OF ALL RESPONSES TO PHQ QUESTIONS 1-9: 7
7. TROUBLE CONCENTRATING ON THINGS, SUCH AS READING THE NEWSPAPER OR WATCHING TELEVISION: NOT AT ALL
2. FEELING DOWN, DEPRESSED OR HOPELESS: NOT AT ALL
4. FEELING TIRED OR HAVING LITTLE ENERGY: MORE THAN HALF THE DAYS
10. IF YOU CHECKED OFF ANY PROBLEMS, HOW DIFFICULT HAVE THESE PROBLEMS MADE IT FOR YOU TO DO YOUR WORK, TAKE CARE OF THINGS AT HOME, OR GET ALONG WITH OTHER PEOPLE: SOMEWHAT DIFFICULT

## 2025-07-14 ASSESSMENT — COLUMBIA-SUICIDE SEVERITY RATING SCALE - C-SSRS
2. HAVE YOU ACTUALLY HAD ANY THOUGHTS OF KILLING YOURSELF?: NO
1. WITHIN THE PAST MONTH, HAVE YOU WISHED YOU WERE DEAD OR WISHED YOU COULD GO TO SLEEP AND NOT WAKE UP?: NO
6. HAVE YOU EVER DONE ANYTHING, STARTED TO DO ANYTHING, OR PREPARED TO DO ANYTHING TO END YOUR LIFE?: NO

## 2025-07-14 NOTE — PROGRESS NOTES
Luli Bartlett is a 39 y.o. female  Have you been to the ER, urgent care clinic since your last visit?  Hospitalized since your last visit?   NO    Have you seen or consulted any other health care providers outside our system since your last visit?   NO    Have you had a mammogram?”   NO    No breast cancer screening on file            Chief Complaint   Patient presents with    Exposure to STD     Pt states thinks has STD burning, itching, red, smells    Discuss Medications     Pt states want to up dosage    Arm Pain     Pt states left arm has been hurting for awhile    Cyst     Pt states thinks has a cyst in vaginal hole     /65 (BP Site: Right Upper Arm, Patient Position: Sitting, BP Cuff Size: Large Adult)   Pulse 97   Temp 97.1 °F (36.2 °C) (Oral)   Resp 18   Ht 1.575 m (5' 2\")   Wt 112.6 kg (248 lb 4.8 oz)   LMP 06/26/2025   SpO2 98%   BMI 45.41 kg/m²     
have a prolapse. No boil noted       Gen: Oriented to person, place and time and well-developed, well-nourished and in no distress.   HEENT:    Head: normocephalic and atraumatic.    Eyes:  EOM are normal. Pupils equal and round.    Neck:  Normal range of motion.  Neck supple.    Cardiovascular: normal rate, regular rhythm and normal heart sounds.   Pulmonary/Chest:  Effort normal and breath sounds normal.  No respiratory distress.  No wheezes, no rales.   Abdominal: soft, normal  bowel sounds.   Musculoskeletal:  No edema, no tenderness.  No calf tenderness or edema.   Neurological:  Alert, oriented to person, place and time.    Skin: skin is warm and dry.       Assessment/ Plan:     Follow-up and Dispositions    Return in 3 months (on 10/14/2025), or if symptoms worsen or fail to improve, for fu for physical when able in person.       Cont care psych,   Defer gabapentin increase until kidney function checked as it was previously high  Discussed vaginal care, no douching       Diagnosis Orders   1. Acute vaginitis  NuSwab Vaginitis Plus (VG+) with Candida (Six Species)    AMB POC URINALYSIS DIP STICK AUTO W/O MICRO      2. Substance use disorder        3. Adjustment disorder with mixed anxiety and depressed mood        4. Vaginal prolapse  MARIANNA - Samantha Cota MD, Urogynecology, [place]      5. Elevated serum creatinine  Basic Metabolic Panel      6. Screening examination for STD (sexually transmitted disease)  HIV 1/2 Ag/Ab, 4TH Generation,W Rflx Confirm    T Pallidum Screen W/Reflex    Hepatitis C Ab, Rflx to Qt by PCR      7. Left arm pain        8. Screening mammogram for breast cancer  TITO DIGITAL SCREEN W OR WO CAD BILATERAL              I have discussed the diagnosis with the patient and the intended plan as seen in the above orders.  The patient has received an after-visit summary and questions were answered concerning future plans.  Pt conveyed understanding of plan.      Medication Side Effects

## 2025-07-17 LAB
A VAGINAE DNA VAG QL NAA+PROBE: NORMAL SCORE
BVAB2 DNA VAG QL NAA+PROBE: NORMAL SCORE
C ALBICANS DNA VAG QL NAA+PROBE: NEGATIVE
C GLABRATA DNA VAG QL NAA+PROBE: NEGATIVE
C KRUSEI DNA VAG QL NAA+PROBE: NEGATIVE
C LUSITANIAE DNA VAG QL NAA+PROBE: NEGATIVE
C TRACH DNA SPEC QL NAA+PROBE: NEGATIVE
CANDIDA DNA VAG QL NAA+PROBE: NEGATIVE
MEGA1 DNA VAG QL NAA+PROBE: NORMAL SCORE
N GONORRHOEA DNA VAG QL NAA+PROBE: NEGATIVE
T VAGINALIS DNA VAG QL NAA+PROBE: NEGATIVE

## 2025-07-18 ENCOUNTER — RESULTS FOLLOW-UP (OUTPATIENT)
Facility: CLINIC | Age: 40
End: 2025-07-18

## 2025-07-28 DIAGNOSIS — F43.23 ADJUSTMENT DISORDER WITH MIXED ANXIETY AND DEPRESSED MOOD: ICD-10-CM

## 2025-07-31 RX ORDER — GABAPENTIN 400 MG/1
400 CAPSULE ORAL 3 TIMES DAILY
Qty: 90 CAPSULE | Refills: 1 | Status: SHIPPED | OUTPATIENT
Start: 2025-07-31 | End: 2026-07-31

## 2025-08-15 ENCOUNTER — TELEMEDICINE (OUTPATIENT)
Facility: CLINIC | Age: 40
End: 2025-08-15

## 2025-08-15 DIAGNOSIS — Z71.89 ENCOUNTER FOR MEDICATION COUNSELING: Primary | ICD-10-CM
